# Patient Record
Sex: MALE | Race: BLACK OR AFRICAN AMERICAN | NOT HISPANIC OR LATINO | Employment: OTHER | URBAN - METROPOLITAN AREA
[De-identification: names, ages, dates, MRNs, and addresses within clinical notes are randomized per-mention and may not be internally consistent; named-entity substitution may affect disease eponyms.]

---

## 2017-01-06 ENCOUNTER — OFFICE VISIT (OUTPATIENT)
Dept: FAMILY MEDICINE | Facility: CLINIC | Age: 57
End: 2017-01-06

## 2017-01-06 ENCOUNTER — CLINICAL SUPPORT (OUTPATIENT)
Dept: FAMILY MEDICINE | Facility: CLINIC | Age: 57
End: 2017-01-06

## 2017-01-06 DIAGNOSIS — Z02.89 PHYSICAL EXAMINATION OF EMPLOYEE: Primary | ICD-10-CM

## 2017-01-06 DIAGNOSIS — Z02.1 PRE-EMPLOYMENT HEALTH SCREENING EXAMINATION: Primary | ICD-10-CM

## 2017-01-06 PROCEDURE — 99204 OFFICE O/P NEW MOD 45 MIN: CPT | Mod: ,,, | Performed by: FAMILY MEDICINE

## 2017-01-06 PROCEDURE — 94010 BREATHING CAPACITY TEST: CPT | Mod: ,,, | Performed by: FAMILY MEDICINE

## 2017-02-11 PROBLEM — E11.9 DIABETES MELLITUS, TYPE 2: Status: ACTIVE | Noted: 2017-02-11

## 2017-02-11 PROBLEM — R07.9 CHEST PAIN: Status: ACTIVE | Noted: 2017-02-11

## 2017-02-11 PROBLEM — I10 HYPERTENSION: Status: ACTIVE | Noted: 2017-02-11

## 2017-03-17 ENCOUNTER — TELEPHONE (OUTPATIENT)
Dept: VASCULAR SURGERY | Facility: CLINIC | Age: 57
End: 2017-03-17

## 2017-03-17 NOTE — TELEPHONE ENCOUNTER
Spoke with pt and  regarding FMLA paper work. Advised missing the last page.  Pt states she will fax over.  Once received will complete and return to pt.

## 2017-03-17 NOTE — TELEPHONE ENCOUNTER
----- Message from Rosibel Cazares sent at 3/17/2017 10:32 AM CDT -----  Contact: wife,  lizy   Wants to know if [facility] paperwork was received .  Lizy Lozano is Pt, but Luis is caretaker   Call back

## 2017-03-30 ENCOUNTER — OFFICE VISIT (OUTPATIENT)
Dept: FAMILY MEDICINE | Facility: CLINIC | Age: 57
End: 2017-03-30
Payer: COMMERCIAL

## 2017-03-30 VITALS
BODY MASS INDEX: 31.51 KG/M2 | HEART RATE: 75 BPM | DIASTOLIC BLOOD PRESSURE: 86 MMHG | HEIGHT: 69 IN | WEIGHT: 212.75 LBS | RESPIRATION RATE: 18 BRPM | OXYGEN SATURATION: 98 % | SYSTOLIC BLOOD PRESSURE: 134 MMHG

## 2017-03-30 DIAGNOSIS — Z12.5 ENCOUNTER FOR SCREENING FOR MALIGNANT NEOPLASM OF PROSTATE: ICD-10-CM

## 2017-03-30 DIAGNOSIS — Z13.9 SCREENING: ICD-10-CM

## 2017-03-30 DIAGNOSIS — Z00.00 ROUTINE PHYSICAL EXAMINATION: Primary | ICD-10-CM

## 2017-03-30 DIAGNOSIS — I10 ESSENTIAL HYPERTENSION: ICD-10-CM

## 2017-03-30 PROCEDURE — 99999 PR PBB SHADOW E&M-EST. PATIENT-LVL III: CPT | Mod: PBBFAC,,, | Performed by: INTERNAL MEDICINE

## 2017-03-30 PROCEDURE — 3075F SYST BP GE 130 - 139MM HG: CPT | Mod: S$GLB,,, | Performed by: INTERNAL MEDICINE

## 2017-03-30 PROCEDURE — 99386 PREV VISIT NEW AGE 40-64: CPT | Mod: S$GLB,,, | Performed by: INTERNAL MEDICINE

## 2017-03-30 PROCEDURE — 3079F DIAST BP 80-89 MM HG: CPT | Mod: S$GLB,,, | Performed by: INTERNAL MEDICINE

## 2017-03-30 RX ORDER — HYDROCHLOROTHIAZIDE 12.5 MG/1
12.5 TABLET ORAL DAILY
Qty: 30 TABLET | Refills: 6 | Status: SHIPPED | OUTPATIENT
Start: 2017-03-30 | End: 2017-10-23 | Stop reason: SDUPTHER

## 2017-03-30 RX ORDER — ASPIRIN 81 MG/1
81 TABLET ORAL DAILY
COMMUNITY
End: 2019-04-26 | Stop reason: SDUPTHER

## 2017-03-30 RX ORDER — GLIMEPIRIDE 4 MG/1
8 TABLET ORAL
Qty: 60 TABLET | Refills: 6 | Status: SHIPPED | OUTPATIENT
Start: 2017-03-30 | End: 2017-12-18 | Stop reason: SDUPTHER

## 2017-03-30 NOTE — PROGRESS NOTES
Subjective:       Patient ID: Luis Lozano is a 56 y.o. male.    Chief Complaint: Annual Exam    HPI Comments: Here for routine health maintenance.  No new complaints.    DM uncontrolled   HTN - controlled    Review of Systems   Constitutional: Negative for appetite change and fever.   HENT: Negative for nosebleeds and trouble swallowing.    Eyes: Negative for discharge and visual disturbance.   Respiratory: Negative for choking and shortness of breath.    Cardiovascular: Negative for chest pain and palpitations.   Gastrointestinal: Negative for abdominal pain, nausea and vomiting.   Musculoskeletal: Negative for arthralgias and joint swelling.   Skin: Negative for rash and wound.   Neurological: Negative for dizziness and syncope.   Psychiatric/Behavioral: Negative for confusion and dysphoric mood.       Objective:      Vitals:    03/30/17 0735   BP: 134/86   Pulse: 75   Resp: 18     Physical Exam   Constitutional: He appears well-nourished.   Eyes: Conjunctivae and EOM are normal.   Neck: Trachea normal and normal range of motion. No thyromegaly present.   Cardiovascular: Normal heart sounds.    Pulses:       Dorsalis pedis pulses are 2+ on the right side, and 2+ on the left side.   Edema negative   Pulmonary/Chest: Effort normal and breath sounds normal.   Abdominal: Soft. There is no hepatomegaly.   Musculoskeletal:   ROM normal bilateral  Strength normal bilateral   Feet:   Right Foot:   Protective Sensation: 4 sites tested. 4 sites sensed.   Left Foot:   Protective Sensation: 4 sites tested. 4 sites sensed.   Neurological: He has normal reflexes. No cranial nerve deficit.   Skin: Skin is warm, dry and intact.   Psychiatric: He has a normal mood and affect.   Alert and Oriented    Vitals reviewed.        Assessment:       1. Routine physical examination    2. Uncontrolled type 2 diabetes mellitus without complication, without long-term current use of insulin    3. Essential hypertension    4. Encounter for  "screening for malignant neoplasm of prostate    5. Screening        Plan:       Routine physical examination    Uncontrolled type 2 diabetes mellitus without complication, without long-term current use of insulin  -     Hemoglobin A1c; Future; Expected date: 6/28/17  -     Microalbumin/creatinine urine ratio; Future; Expected date: 6/28/17  -     CBC auto differential; Future; Expected date: 6/28/17  -     glimepiride (AMARYL) 4 MG tablet; Take 2 tablets (8 mg total) by mouth daily with breakfast.  Dispense: 60 tablet; Refill: 6  -     SITagliptan (JANUVIA) 100 MG Tab; Take 1 tablet (100 mg total) by mouth once daily.  Dispense: 30 tablet; Refill: 6  -     Ambulatory referral to Optometry    Essential hypertension  -     Lipid panel; Future; Expected date: 6/28/17  -     Comprehensive metabolic panel; Future; Expected date: 6/28/17  -     CBC auto differential; Future; Expected date: 6/28/17  -     hydrochlorothiazide (HYDRODIURIL) 12.5 MG Tab; Take 1 tablet (12.5 mg total) by mouth once daily.  Dispense: 30 tablet; Refill: 6    Encounter for screening for malignant neoplasm of prostate  -     PSA, Screening; Future; Expected date: 6/28/17    Screening  -     Hepatitis C antibody; Future; Expected date: 6/28/17    Wellness reviewed        Counseled on regular exercise, maintenance of a healthy weight, balanced diet rich in fruits/vegetables and lean protein, and avoidance of unhealthy habits like smoking and excessive alcohol intake.   Also, counseled on importance of being compliant with medication, health appointments, diet and exercise.     Return in about 3 months (around 6/30/2017).    "This note will not be shared with the patient."  "

## 2017-03-30 NOTE — MR AVS SNAPSHOT
[facility]  [address]  [facility] [address]  Phone: [phone]  Fax: [phone]                  Luis Lozano   3/30/2017 7:30 AM   Office Visit    Description:  Male : 1960   Provider:  Darrius Lawrence MD   Department:  [facility]           Reason for Visit     Annual Exam           Diagnoses this Visit        Comments    Routine physical examination    -  Primary     Uncontrolled type 2 diabetes mellitus without complication, without long-term current use of insulin         Essential hypertension         Encounter for screening for malignant neoplasm of prostate         Screening                To Do List           Future Appointments        Provider Department Dept Phone    6/3/2017 8:30 AM LAB, [place] [facility] [phone]    6/3/2017 10:15 AM LAB, [place] [facility] [phone]    2017 5:50 PM Darrius Lawrence MD [facility] [phone]      Goals (5 Years of Data)     None      Follow-Up and Disposition     Return in about 2 months (around 2017).    Follow-up and Disposition History       These Medications        Disp Refills Start End    glimepiride (AMARYL) 4 MG tablet 60 tablet 6 3/30/2017     Take 2 tablets (8 mg total) by mouth daily with breakfast. - Oral    Pharmacy: [facility] [address] [place] [address] Ph #: [phone]       hydrochlorothiazide (HYDRODIURIL) 12.5 MG Tab 30 tablet 6 3/30/2017     Take 1 tablet (12.5 mg total) by mouth once daily. - Oral    Pharmacy: [facility] [address] [place] [address] Ph #: [phone]       SITagliptan (JANUVIA) 100 MG Tab 30 tablet 6 3/30/2017 3/30/2018    Take 1 tablet (100 mg total) by mouth once daily. - Oral    Pharmacy: [facility] [address] [place] [address] Ph #: [phone]         [facility]     [facility] Nurse Care Line -  Assistance  Unless  otherwise directed by your provider, please contact [facility], our nurse care line that is available for 24/7 assistance.     Registered nurses in the [facility] provide: appointment scheduling, clinical advisement, health education, and other advisory services.  Call: [phone] (toll free)               Medications           Message regarding Medications     Verify the changes and/or additions to your medication regime listed below are the same as discussed with your clinician today.  If any of these changes or additions are incorrect, please notify your healthcare provider.        START taking these NEW medications        Refills    SITagliptan (JANUVIA) 100 MG Tab 6    Sig: Take 1 tablet (100 mg total) by mouth once daily.    Class: Normal    Route: Oral      CHANGE how you are taking these medications     Start Taking Instead of    glimepiride (AMARYL) 4 MG tablet glimepiride (AMARYL) 4 MG tablet    Dosage:  Take 2 tablets (8 mg total) by mouth daily with breakfast. Dosage:  4 mg daily with breakfast.     Reason for Change:  Reorder     hydrochlorothiazide (HYDRODIURIL) 12.5 MG Tab hydrochlorothiazide (HYDRODIURIL) 25 MG tablet    Dosage:  Take 1 tablet (12.5 mg total) by mouth once daily. Dosage:  Take 12.5 mg by mouth once daily.     Reason for Change:  Reorder       STOP taking these medications     VIAGRA 100 mg tablet     chlorthalidone (HYGROTEN) 25 MG Tab 25 mg once daily.            Verify that the below list of medications is an accurate representation of the medications you are currently taking.  If none reported, the list may be blank. If incorrect, please contact your healthcare provider. Carry this list with you in case of emergency.           Current Medications     aspirin (ECOTRIN) 81 MG EC tablet Take 81 mg by mouth once daily.    glimepiride (AMARYL) 4 MG tablet Take 2 tablets (8 mg total) by mouth daily with breakfast.    hydrochlorothiazide (HYDRODIURIL) 12.5 MG Tab  "Take 1 tablet (12.5 mg total) by mouth once daily.    multivitamin (ONE DAILY MULTIVITAMIN) per tablet Take 1 tablet by mouth once daily.    SITagliptan (JANUVIA) 100 MG Tab Take 1 tablet (100 mg total) by mouth once daily.           Clinical Reference Information           Your Vitals Were     BP Pulse Resp Height Weight SpO2    134/86 (BP Location: Right arm, Patient Position: Sitting, BP Method: Manual) 75 18 5' 9" (1.753 m) 96.5 kg (212 lb 11.9 oz) 98%    BMI                31.42 kg/m2          Blood Pressure          Most Recent Value    BP  134/86      Allergies as of 3/30/2017     No Known Allergies      Immunizations Administered on Date of Encounter - 3/30/2017     None      Orders Placed During Today's Visit      Normal Orders This Visit    Ambulatory referral to Optometry     Future Labs/Procedures Expected by Expires    CBC auto differential  6/28/2017 5/29/2018    Comprehensive metabolic panel  6/28/2017 3/30/2018    Hemoglobin A1c  6/28/2017 3/30/2018    Hepatitis C antibody  6/28/2017 5/29/2018    Lipid panel  6/28/2017 3/30/2018    Microalbumin/creatinine urine ratio  6/28/2017 3/30/2018    PSA, Screening  6/28/2017 3/30/2018      Language Assistance Services     ATTENTION: Language assistance services are available, free of charge. Please call [phone].      ATENCIÓN: Si habla español, tiene a ubrch disposición servicios gratuitos de asistencia lingüística. Llame al [phone].     CHÚ Ý: N?u b?n nói Ti?ng Vi?t, có các d?ch v? h? tr? ngôn ng? mi?n phí dành cho b?n. G?i s? [phone].         [facility] complies with applicable Federal civil rights laws and does not discriminate on the basis of race, color, national origin, age, disability, or sex.        "

## 2017-04-06 ENCOUNTER — TELEPHONE (OUTPATIENT)
Dept: FAMILY MEDICINE | Facility: CLINIC | Age: 57
End: 2017-04-06

## 2017-05-30 ENCOUNTER — PATIENT OUTREACH (OUTPATIENT)
Dept: ADMINISTRATIVE | Facility: HOSPITAL | Age: 57
End: 2017-05-30

## 2017-05-30 NOTE — LETTER
May 30, 2017    Luis Lozano  [address] [address]             [facility]  [address]  [facility] [place] [facility] [address]  Phone: [phone] Dear Mr. Lozano:    [facility] is committed to your overall health.  To help you get the most out of each of your visits, we will review your information to make sure you are up to date on all of your recommended tests and/or procedures.      Dr. Lawrence       has found that you may be due for:    Tetanus immunization    REMINDER: lab appointment 6/3/17    If you have had any of the above done at another facility, please bring the records or information with you so that your record at [facility] will be complete.     If you are currently taking medication, please bring it with you to your appointment for review.    If you have any questions or concerns, please don't hesitate to call.    Sincerely,    Jenn Strong  Clinical Care Coordinator  [facility] [address] [facility] [place] [address]  Phone: [phone]   Fax: [phone]

## 2017-06-03 ENCOUNTER — LAB VISIT (OUTPATIENT)
Dept: LAB | Facility: HOSPITAL | Age: 57
End: 2017-06-03
Attending: INTERNAL MEDICINE
Payer: COMMERCIAL

## 2017-06-03 DIAGNOSIS — Z13.9 ENCOUNTER FOR SCREENING, UNSPECIFIED: ICD-10-CM

## 2017-06-03 DIAGNOSIS — Z12.5 ENCOUNTER FOR SCREENING FOR MALIGNANT NEOPLASM OF PROSTATE: ICD-10-CM

## 2017-06-03 DIAGNOSIS — I10 ESSENTIAL HYPERTENSION: ICD-10-CM

## 2017-06-03 LAB
ALBUMIN SERPL BCP-MCNC: 3.7 G/DL
ALP SERPL-CCNC: 52 U/L
ALT SERPL W/O P-5'-P-CCNC: 25 U/L
ANION GAP SERPL CALC-SCNC: 8 MMOL/L
AST SERPL-CCNC: 22 U/L
BASOPHILS # BLD AUTO: 0.05 K/UL
BASOPHILS NFR BLD: 0.8 %
BILIRUB SERPL-MCNC: 1 MG/DL
BUN SERPL-MCNC: 23 MG/DL
CALCIUM SERPL-MCNC: 9.6 MG/DL
CHLORIDE SERPL-SCNC: 100 MMOL/L
CHOLEST/HDLC SERPL: 4 {RATIO}
CO2 SERPL-SCNC: 31 MMOL/L
COMPLEXED PSA SERPL-MCNC: 1.1 NG/ML
CREAT SERPL-MCNC: 1.2 MG/DL
DIFFERENTIAL METHOD: NORMAL
EOSINOPHIL # BLD AUTO: 0.1 K/UL
EOSINOPHIL NFR BLD: 1.9 %
ERYTHROCYTE [DISTWIDTH] IN BLOOD BY AUTOMATED COUNT: 13.4 %
EST. GFR  (AFRICAN AMERICAN): >60 ML/MIN/1.73 M^2
EST. GFR  (NON AFRICAN AMERICAN): >60 ML/MIN/1.73 M^2
GLUCOSE SERPL-MCNC: 207 MG/DL
HCT VFR BLD AUTO: 50.6 %
HDL/CHOLESTEROL RATIO: 24.9 %
HDLC SERPL-MCNC: 225 MG/DL
HDLC SERPL-MCNC: 56 MG/DL
HGB BLD-MCNC: 17.1 G/DL
LDLC SERPL CALC-MCNC: 146 MG/DL
LYMPHOCYTES # BLD AUTO: 2 K/UL
LYMPHOCYTES NFR BLD: 31.4 %
MCH RBC QN AUTO: 29.9 PG
MCHC RBC AUTO-ENTMCNC: 33.8 %
MCV RBC AUTO: 89 FL
MONOCYTES # BLD AUTO: 0.4 K/UL
MONOCYTES NFR BLD: 7.1 %
NEUTROPHILS # BLD AUTO: 3.6 K/UL
NEUTROPHILS NFR BLD: 58.5 %
NONHDLC SERPL-MCNC: 169 MG/DL
PLATELET # BLD AUTO: 253 K/UL
PMV BLD AUTO: 11.1 FL
POTASSIUM SERPL-SCNC: 4.2 MMOL/L
PROT SERPL-MCNC: 7.3 G/DL
RBC # BLD AUTO: 5.71 M/UL
SODIUM SERPL-SCNC: 139 MMOL/L
TRIGL SERPL-MCNC: 115 MG/DL
WBC # BLD AUTO: 6.22 K/UL

## 2017-06-03 PROCEDURE — 80053 COMPREHEN METABOLIC PANEL: CPT

## 2017-06-03 PROCEDURE — 85025 COMPLETE CBC W/AUTO DIFF WBC: CPT

## 2017-06-03 PROCEDURE — 86803 HEPATITIS C AB TEST: CPT

## 2017-06-03 PROCEDURE — 80061 LIPID PANEL: CPT

## 2017-06-03 PROCEDURE — 36415 COLL VENOUS BLD VENIPUNCTURE: CPT | Mod: PO

## 2017-06-03 PROCEDURE — 83036 HEMOGLOBIN GLYCOSYLATED A1C: CPT

## 2017-06-03 PROCEDURE — 84153 ASSAY OF PSA TOTAL: CPT

## 2017-06-05 LAB
ESTIMATED AVG GLUCOSE: 258 MG/DL
HBA1C MFR BLD HPLC: 10.6 %
HCV AB SERPL QL IA: NEGATIVE

## 2017-06-12 ENCOUNTER — OFFICE VISIT (OUTPATIENT)
Dept: FAMILY MEDICINE | Facility: CLINIC | Age: 57
End: 2017-06-12
Payer: COMMERCIAL

## 2017-06-12 VITALS
WEIGHT: 213.38 LBS | SYSTOLIC BLOOD PRESSURE: 110 MMHG | HEART RATE: 62 BPM | HEIGHT: 69 IN | OXYGEN SATURATION: 99 % | DIASTOLIC BLOOD PRESSURE: 74 MMHG | BODY MASS INDEX: 31.6 KG/M2

## 2017-06-12 DIAGNOSIS — I10 ESSENTIAL HYPERTENSION: ICD-10-CM

## 2017-06-12 DIAGNOSIS — E78.5 DYSLIPIDEMIA: ICD-10-CM

## 2017-06-12 PROCEDURE — 3046F HEMOGLOBIN A1C LEVEL >9.0%: CPT | Mod: S$GLB,,, | Performed by: INTERNAL MEDICINE

## 2017-06-12 PROCEDURE — 99214 OFFICE O/P EST MOD 30 MIN: CPT | Mod: S$GLB,,, | Performed by: INTERNAL MEDICINE

## 2017-06-12 PROCEDURE — 99999 PR PBB SHADOW E&M-EST. PATIENT-LVL III: CPT | Mod: PBBFAC,,, | Performed by: INTERNAL MEDICINE

## 2017-06-12 RX ORDER — PIOGLITAZONEHYDROCHLORIDE 30 MG/1
30 TABLET ORAL DAILY
Qty: 30 TABLET | Refills: 6 | Status: SHIPPED | OUTPATIENT
Start: 2017-06-12 | End: 2019-08-05

## 2017-06-12 RX ORDER — ALOGLIPTIN 25 MG/1
25 TABLET, FILM COATED ORAL DAILY
Qty: 30 TABLET | Refills: 6 | Status: SHIPPED | OUTPATIENT
Start: 2017-06-12 | End: 2017-06-14

## 2017-06-12 RX ORDER — METFORMIN HYDROCHLORIDE 500 MG/1
TABLET, EXTENDED RELEASE ORAL
Qty: 120 TABLET | Refills: 6 | Status: SHIPPED | OUTPATIENT
Start: 2017-06-12 | End: 2018-08-23 | Stop reason: SDUPTHER

## 2017-06-12 RX ORDER — ATORVASTATIN CALCIUM 20 MG/1
20 TABLET, FILM COATED ORAL DAILY
Qty: 30 TABLET | Refills: 6 | Status: SHIPPED | OUTPATIENT
Start: 2017-06-12 | End: 2019-04-26 | Stop reason: SDUPTHER

## 2017-06-14 ENCOUNTER — TELEPHONE (OUTPATIENT)
Dept: DIABETES | Facility: CLINIC | Age: 57
End: 2017-06-14

## 2017-06-14 ENCOUNTER — TELEPHONE (OUTPATIENT)
Dept: FAMILY MEDICINE | Facility: CLINIC | Age: 57
End: 2017-06-14

## 2017-06-14 NOTE — TELEPHONE ENCOUNTER
Please, call and have pt  new rx.  Have pt ask pharmacy to see which drug his insurance would cover in the same family as alogliptin.

## 2017-06-14 NOTE — TELEPHONE ENCOUNTER
Contacted patient to see if we could find him appointment that would work with his schedule.  Unable to reach him but left message on his machine for him to call us back.      ----- Message from Janeth Ray sent at 6/12/2017  5:57 PM CDT -----  Contact: self and wife at check out  Dr Lawrence wants Mr Smith and his wife Lizy ([id number]) to have Diabetic Education. They need something later in the evening as he has no vacation time left. I did not know if there was any class or another office that did evening appointments (after 4 pm). Would you mind calling the patient to see if you can help them with this? If not let me know and I will inform Dr Lawrence for other options....  Thank you,  Sravani Ray  FP/IM Check out   [facility]  Phone [phone]  Fax     [phone]

## 2017-09-06 ENCOUNTER — PATIENT OUTREACH (OUTPATIENT)
Dept: ADMINISTRATIVE | Facility: HOSPITAL | Age: 57
End: 2017-09-06

## 2017-09-06 NOTE — LETTER
September 6, 2017    Luis Lozano  [address] [address]             [facility]  [address]  [facility] [place] [facility] [address]  Phone: [phone] Dear Mr. Lozano:    [facility] is committed to your overall health.  To help you get the most out of each of your visits, we will review your information to make sure you are up to date on all of your recommended tests and/or procedures.      Dr. Lawrence       has found that you may be due for:    Annual Dilated Eye Exam  Tetanus immunization    REMINDER:  lab appointment 9/16/17    If you have had any of the above done at another facility, please bring the records or information with you so that your record at [facility] will be complete.     If you are currently taking medication, please bring it with you to your appointment for review.    If you have any questions or concerns, please don't hesitate to call.    Sincerely,    Jenn Strong  Clinical Care Coordinator  [facility] [address] [facility]  [place] [address]  Phone: [phone]   Fax: [phone]

## 2017-09-15 ENCOUNTER — TELEPHONE (OUTPATIENT)
Dept: ADMINISTRATIVE | Facility: HOSPITAL | Age: 57
End: 2017-09-15

## 2017-09-15 NOTE — TELEPHONE ENCOUNTER
"Patient is enrolled in [facility] Quality [employer] program for assistance with management of chronic disease. He/she will receive pre-visit and post-visit calls from a [employer] nurse to assist with closing any gaps in patient's care. Patient informed the nurse of the following:         "Member confirmed appointment with Dr. Lawrence on 9/20/17. Member stated he will have labs drawn prior to the visit. Stated he is checking his blood sugar daily and ranges are between 104-114 fasting. Encouraged member to bring readings to appointment. No questions or concerns regarding upcoming appointment (9/5)  "

## 2017-10-23 DIAGNOSIS — I10 ESSENTIAL HYPERTENSION: ICD-10-CM

## 2017-10-23 RX ORDER — HYDROCHLOROTHIAZIDE 12.5 MG/1
TABLET ORAL
Qty: 90 TABLET | Refills: 0 | Status: SHIPPED | OUTPATIENT
Start: 2017-10-23 | End: 2017-12-18 | Stop reason: SDUPTHER

## 2017-10-23 NOTE — PROGRESS NOTES
Refill Authorization Note     is requesting a refill authorization.    Brief assessment and rational for refill: APPROVE: prr  Name of medication: HYDROCHLOROT 12.5MG TAB  How patient will take medication: t1t po daily   Amount/Quantity of medication ordered: 90d   Medication reconciliation completed: No        Refills Authorized: Yes  If authorized number of refills: 0        Medication Therapy Plan: Last kidney labs WNL.  Last BP controlled.  Needs updated kidney labs; old order active but NTB scheduled.  Hilario 3 mo  Comments:   Lab Results   Component Value Date    CREATININE 1.2 06/03/2017    BUN 23 (H) 06/03/2017     06/03/2017    K 4.2 06/03/2017     06/03/2017    CO2 31 (H) 06/03/2017      BP Readings from Last 3 Encounters:   06/12/17 110/74   05/23/17 (!) 147/85   03/30/17 134/86

## 2017-10-23 NOTE — TELEPHONE ENCOUNTER
Spoke with wife, instructed that medication was refilled, but pt needs appt. Verbalized understanding. States that she will call back to schedule at later time due to his difficulty getting off of work for appointment.

## 2017-11-08 ENCOUNTER — PATIENT OUTREACH (OUTPATIENT)
Dept: ADMINISTRATIVE | Facility: HOSPITAL | Age: 57
End: 2017-11-08

## 2017-11-08 NOTE — LETTER
November 8, 2017    Luis Lozano  [address] [address]             [facility]  [address]  [place] [address]  Phone: [phone] Dear Mr. Lozano:    [facility] is committed to your overall health and would like to ensure that you are up to date on your recommended health testing.   Dr. Lawrence has found that you may be due for the following:    Tetanus immunization  Influenza vaccine    Hemoglobin A1C (order is in and ready to schedule)    Diabetic Retinopathy EYE EXAM screening (we now are able to perform this test the same day as your office visit with a health care team member without paying a second copay)    If you have had any of the above done at another facility, please let us know by calling or faxing to the numbers below so that your medical record can be updated. If you have a copy of these records, please fax them to the fax number below.  If not, please call [phone] so that we can get the necessary information to obtain copies from that facility.     Otherwise, please schedule these appointments at your earliest convenience by calling [phone] or going to [email or web address].    Sincerely,    Jenn Strong  Clinical Care Coordinator  [facility] [address] [place] [address]  Phone: [phone]   Fax: [phone]

## 2017-11-08 NOTE — PROGRESS NOTES
Health Maintenance Due   Topic Date Due    Eye Exam  05/06/1968    TETANUS VACCINE  05/06/1976    Influenza Vaccine  08/01/2017    Hemoglobin A1c  09/03/2017     Letter mailed

## 2017-12-18 DIAGNOSIS — I10 ESSENTIAL HYPERTENSION: ICD-10-CM

## 2017-12-18 RX ORDER — GLIMEPIRIDE 4 MG/1
TABLET ORAL
Qty: 60 TABLET | Refills: 0 | Status: SHIPPED | OUTPATIENT
Start: 2017-12-18 | End: 2019-04-26

## 2017-12-18 RX ORDER — HYDROCHLOROTHIAZIDE 12.5 MG/1
TABLET ORAL
Qty: 30 TABLET | Refills: 0 | Status: SHIPPED | OUTPATIENT
Start: 2017-12-18 | End: 2018-04-09 | Stop reason: SDUPTHER

## 2017-12-18 NOTE — TELEPHONE ENCOUNTER
Needs an appointment with a provider; preferably NP Claudine - next available.   Labs right before appointment.  Pend to Claudine

## 2018-02-23 ENCOUNTER — CLINICAL SUPPORT (OUTPATIENT)
Dept: FAMILY MEDICINE | Facility: CLINIC | Age: 58
End: 2018-02-23

## 2018-02-23 ENCOUNTER — OFFICE VISIT (OUTPATIENT)
Dept: FAMILY MEDICINE | Facility: CLINIC | Age: 58
End: 2018-02-23

## 2018-02-23 DIAGNOSIS — Z02.89 PHYSICAL EXAMINATION OF EMPLOYEE: Primary | ICD-10-CM

## 2018-02-23 DIAGNOSIS — Z13.5 DIABETIC RETINOPATHY SCREENING: ICD-10-CM

## 2018-02-23 PROCEDURE — 99204 OFFICE O/P NEW MOD 45 MIN: CPT | Mod: ,,, | Performed by: FAMILY MEDICINE

## 2018-02-23 PROCEDURE — 94010 BREATHING CAPACITY TEST: CPT | Mod: ,,, | Performed by: FAMILY MEDICINE

## 2018-03-02 ENCOUNTER — PATIENT OUTREACH (OUTPATIENT)
Dept: ADMINISTRATIVE | Facility: HOSPITAL | Age: 58
End: 2018-03-02

## 2018-03-02 NOTE — PROGRESS NOTES
Due for a routine check up with     Health Maintenance Due   Topic Date Due    Eye Exam  05/06/1968    TETANUS VACCINE  05/06/1976    Influenza Vaccine  08/01/2017    Hemoglobin A1c  09/03/2017    Foot Exam  03/30/2018

## 2018-03-02 NOTE — LETTER
March 2, 2018    Luis Lozano  [address] [address]             [facility]  [address]  [facility] [address]  Phone: [phone] Dear Mr. Lozano:    [facility] is committed to your overall health.  Our records indicate that you are due for a checkup with your primary care provider,  Dr. Lawrence.  Please call [phone] to schedule a routine physical exam. You may also be due for the following test and/or procedures:    Tetanus immunization  Influenza vaccine  Diabetic Foot Exam  Diabetic Retinopathy EYE EXAM screening (we now are able to perform this test the same day as your office visit with a health care team member without paying a second copay)    The following lab test have been ordered and ready to schedule:      If you have had any of the above done at another facility, please let us know by calling [phone] so that we can update your record.  We will add these results to your chart if you fax them to the fax number listed below.  If you have any questions, please call [phone].    Sincerely,  Jenn Strong  Clinical Care Coordinator  [facility] [address] [place] [address]  Phone: [phone]   Fax: [phone]

## 2018-03-08 LAB
CHOLESTEROL, TOTAL: 159
HDLC SERPL-MCNC: 52 MG/DL
LDLC SERPL CALC-MCNC: 93 MG/DL (ref 0–160)
TRIGL SERPL-MCNC: 72 MG/DL (ref 40–160)

## 2018-04-09 DIAGNOSIS — I10 ESSENTIAL HYPERTENSION: ICD-10-CM

## 2018-04-10 RX ORDER — HYDROCHLOROTHIAZIDE 12.5 MG/1
TABLET ORAL
Qty: 30 TABLET | Refills: 0 | Status: SHIPPED | OUTPATIENT
Start: 2018-04-10 | End: 2019-04-26 | Stop reason: SDUPTHER

## 2018-04-10 NOTE — TELEPHONE ENCOUNTER
Patient stated [facility] at  [facility] - advised this will be the last refill from Dr Lawrence unless he comes back to clinic - verbally understood

## 2018-04-10 NOTE — TELEPHONE ENCOUNTER
Needs appointment with me in my next available regular appointment time slot (do not use any hold spots).   Labs before: A1c, flp, cmp dx DM, HTN, HLD

## 2018-06-13 LAB — A1C: 9.1

## 2018-08-02 LAB
LEFT EYE DM RETINOPATHY: NEGATIVE
RIGHT EYE DM RETINOPATHY: NEGATIVE

## 2018-08-23 DIAGNOSIS — I10 HYPERTENSION, UNSPECIFIED TYPE: Primary | ICD-10-CM

## 2018-08-24 RX ORDER — METFORMIN HYDROCHLORIDE 500 MG/1
TABLET, EXTENDED RELEASE ORAL
Qty: 120 TABLET | Refills: 0 | Status: SHIPPED | OUTPATIENT
Start: 2018-08-24 | End: 2019-06-17 | Stop reason: SDUPTHER

## 2018-08-24 NOTE — TELEPHONE ENCOUNTER
Needs an appointment. Needs an appointment with me.  OK Green Hold.  NO Yellow hold and NO AREX hold.   A1c, flp, cmp, micro urine before dx dm, htn

## 2018-11-07 ENCOUNTER — OFFICE VISIT (OUTPATIENT)
Dept: PODIATRY | Facility: CLINIC | Age: 58
End: 2018-11-07
Payer: COMMERCIAL

## 2018-11-07 ENCOUNTER — HOSPITAL ENCOUNTER (OUTPATIENT)
Dept: RADIOLOGY | Facility: HOSPITAL | Age: 58
Discharge: HOME | End: 2018-11-07
Attending: PODIATRIST
Payer: COMMERCIAL

## 2018-11-07 VITALS
DIASTOLIC BLOOD PRESSURE: 90 MMHG | WEIGHT: 219 LBS | BODY MASS INDEX: 32.44 KG/M2 | HEART RATE: 67 BPM | SYSTOLIC BLOOD PRESSURE: 159 MMHG | HEIGHT: 69 IN

## 2018-11-07 DIAGNOSIS — M76.61 TENDONITIS, ACHILLES, RIGHT: ICD-10-CM

## 2018-11-07 DIAGNOSIS — M21.6X1 ACQUIRED EQUINUS DEFORMITY OF BOTH FEET: ICD-10-CM

## 2018-11-07 DIAGNOSIS — M72.2 PLANTAR FASCIITIS OF LEFT FOOT: ICD-10-CM

## 2018-11-07 DIAGNOSIS — M21.6X2 ACQUIRED EQUINUS DEFORMITY OF BOTH FEET: ICD-10-CM

## 2018-11-07 DIAGNOSIS — M21.6X1 ACQUIRED EQUINUS DEFORMITY OF BOTH FEET: Primary | ICD-10-CM

## 2018-11-07 DIAGNOSIS — M21.6X2 ACQUIRED EQUINUS DEFORMITY OF BOTH FEET: Primary | ICD-10-CM

## 2018-11-07 PROCEDURE — 3080F DIAST BP >= 90 MM HG: CPT | Mod: CPTII,S$GLB,, | Performed by: PODIATRIST

## 2018-11-07 PROCEDURE — 99999 PR PBB SHADOW E&M-EST. PATIENT-LVL IV: CPT | Mod: PBBFAC,,, | Performed by: PODIATRIST

## 2018-11-07 PROCEDURE — 99203 OFFICE O/P NEW LOW 30 MIN: CPT | Mod: S$GLB,,, | Performed by: PODIATRIST

## 2018-11-07 PROCEDURE — 73650 X-RAY EXAM OF HEEL: CPT | Mod: 26,50,, | Performed by: RADIOLOGY

## 2018-11-07 PROCEDURE — 73650 X-RAY EXAM OF HEEL: CPT | Mod: 50,TC,PO

## 2018-11-07 PROCEDURE — 3008F BODY MASS INDEX DOCD: CPT | Mod: CPTII,S$GLB,, | Performed by: PODIATRIST

## 2018-11-07 PROCEDURE — 3077F SYST BP >= 140 MM HG: CPT | Mod: CPTII,S$GLB,, | Performed by: PODIATRIST

## 2018-11-07 RX ORDER — METHYLPREDNISOLONE 4 MG/1
TABLET ORAL
Qty: 1 PACKAGE | Refills: 0 | Status: SHIPPED | OUTPATIENT
Start: 2018-11-07 | End: 2018-11-28

## 2018-11-07 NOTE — PATIENT INSTRUCTIONS
1. Stretch calf and plantar fascia at least 3x per day for 30 sec and before getting out of bed.    2. Supportive shoes at all times (athletic shoe including pemberton, new balance, asics, HOKA or casual shoes like Dansko, Felicia, Naot, Vionoic, Fit flop  clog or wedge with extra heel padding and arch support. For house slippers would recommend Fitflop or Spenco found on amazon.com, Never walk barefoot or in flats.    (Varsity sports, Phidippides, [place] running company, Masseys, Goodfeet, Cantilever, Feet First, Foot Solutions, Therapeutic shoes, SAS, [facility] pro shop) http://www.Analiza/    3. Orthotic (recommend the following brands: Superfeet, Spenco, Powerstep, Sof Sole Fit Series)              4. NSAID's for 2-3 weeks if no GI or Kidney problems (example: ibuprofen 600 mg 2 x per day)    5. ICE massage with frozen water bottle 2x per day for 30 minutes.    6. Consider night splint, custom orthotics, therapy and/or steroid injection    What Is Plantar Fasciitis?   The plantar fascia is a ligament-like band running from your heel to the ball of your foot. This band pulls on the heel bone, raising the arch of your foot as it pushes off the ground. But if your foot moves incorrectly, the plantar fascia may become strained. The fascia may swell and its tiny fibers may begin to fray, causing plantar fasciitis.  Causes  Plantar fasciitis is often caused by poor foot mechanics. If your foot flattens too much, the fascia may overstretch and swell. If your foot flattens too little, the fascia may ache from being pulled too tight.    The plantar fascia is a thick, fibrous layer of tissue that covers the bones on the bottom of your foot. It holds the foot bones in an arched position. Plantar fasciitis is a painful swelling of the plantar fascia.  A heel spur is an overgrowth of bone where the plantar fascia attaches to the heel bone. The heel spur itself usually doesnt cause pain. However, the heel  spur might be a sign of plantar fasciitis which may cause your foot pain. There is no specific treatment for heel spurs.   Plantar fasciitis can develop slowly or suddenly. It usually affects one foot at a time. Heel pain can feel sharp, like a knife sticking into the bottom of your foot. You may feel pain after exercising, long-distance jogging, stair climbing, long periods of standing, or after standing up.  Risk factors for plantar fasciitis include: arthritis, diabetes, obesity or recent weight gain, flat foot, and having high arches. Wearing high heels, loose shoes, or shoes with poor arch support adds to the risk.    Foot pain is usually worse in the morning. But it improves with walking. By the end of the day there may be a dull aching. Treatment includes short-term rest and controlling inflammation. It may take up to 9 months before all symptoms go away. In rare cases, a steroid injection in the foot, or surgery, may be needed.  Home care  · If you are overweight, lose weight to help healing.  · Choose supportive shoes with good arch support and shock absorbency. Replace athletic shoes when they become worn out. Dont walk or run barefoot.  · Premade or custom-fitted shoe inserts may be helpful. Inserts made of silicone seem to be the most effective. Custom-made inserts can be provided by a podiatrist or foot specialist, physical therapist, or orthopedist.  · Premade or custom-made night splints keep the heel stretched out while you sleep. They may prevent morning pain.  · Avoid activities that stress the feet: jogging, prolonged standing or walking, contact sports, etc.  · First thing in the morning and before sports, stretch the bottom of your foot. Gently flex your ankle so the toes move toward your knee.  · Icing may help control heel pain. Apply an ice pack to the heel for 10-20 minutes as a preventive. Or ice your heel after a severe flare-up of symptoms. You may repeat this every 1-2 hours as  needed.  · You may use over-the-counter pain medicine to control pain, unless another medicine was prescribed. Anti-inflammatory pain medicines, such as ibuprofen or naproxen, may work better than acetaminophen. If you have chronic liver or kidney disease or ever had a stomach ulcer or GI bleeding, talk with your healthcare provider before using these medicines.  · Shoe inserts, a night splint, or a special boot may be needed. Use these as directed by your healthcare provider.      Treating Plantar Fasciitis    First, your doctor relieves pain. Then, the cause of your problem may be found and corrected. If your pain is due to poor foot mechanics, custom-made shoe inserts (orthoses) may help.        Reduce Symptoms:  · To relieve mild symptoms, try aspirin, ibuprofen, or other medications as directed. Rubbing ice on the affected area may also help.  · To reduce severe pain and swelling, your doctor may prescribe pills or injections or a walking cast in some instances. Physical therapy, such as ultrasound or a daily stretching program, may also be recommended. Surgery is rarely required.  · To reduce symptoms caused by poor foot mechanics, your foot may be taped. This supports the arch and temporarily controls movement. Night splints may also help by stretching the fascia.    Control Movement  If taping helps, your doctor may prescribe orthoses. Built from plaster casts of your feet, these inserts control the way your foot moves. As a result, your symptoms should go away.  If Surgery Is Needed  Your doctor may consider surgery if other types of treatment don't control your pain. During surgery, the plantar fascia is partially cut to release tension. As you heal, fibrous tissue fills the space between the heel bone and the plantar fascia.   Reduce Overuse  Every time your foot strikes the ground, the plantar fascia is stretched. You can reduce the strain on the plantar fascia and the possibility of overuse by following  these suggestions:  · Lose any excess weight.  · Avoid running on hard or uneven ground.  · Use orthoses at all times in your shoes and house slippers.  © 3242-4493 [employer]. [address] [place] [address]. All rights reserved. This information is not intended as a substitute for professional medical care. Always follow your healthcare professional's instructions.            _                Lower Body Exercises: Calf Stretch    This exercise both stretches and strengthens your lower body to help your back. Do the exercise as often as suggested by your health care provider. As you work out, dont rush or strain. Use an exercise mat, pillow, or folded towel to protect your knees and other sensitive areas.  · Face a wall 2 feet away. Step toward the wall with one foot.  · Place both palms on the wall and bend your front knee.  · Lean forward, keeping the back leg straight and the heel on the floor.  · Hold for 20 seconds. Switch legs.  © 4797-7782 [employer]. [address] [place] [address]. All rights reserved. This information is not intended as a substitute for professional medical care. Always follow your healthcare professional's instructions.    These instructions are for your right foot. Switch sides for your left foot.  1. Sit in a chair. Rest your right ankle on your left knee.  2. Hold your toes with your right hand. Gently bend the toes backward. Feel a stretch in the undersides of the toes and ball of the foot. Hold for 30 to 60 seconds.  3. Then gently bend the toes in the other direction. Gently press on them until your foot is pointed. Hold for 30 to 60 seconds.  4. Repeat 5 times, or as instructed.  Date Last Reviewed: 5/1/2016  © 7895-2505 [employer]. [address] [place] [address]. All rights reserved. This information is not intended as a substitute for professional medical care. Always follow your healthcare  professional's instructions.

## 2018-11-14 NOTE — PROGRESS NOTES
Subjective:      Patient ID: Luis Lozano is a 58 y.o. male.    Chief Complaint: Diabetes Mellitus (PCP:  Dr Pisano (Dr Reyes) 2/23/18; HgbA1c: 6/3/17  10.6); Foot Pain (amanda ); and Heel Pain (left)    Luis is a 58 y.o. male who presents to the clinic complaining of heel pain in the left foot, especially with the first step in the morning. The pain is described as Aching and Sharp. The onset of the pain was gradual and has worsened over the past several weeks. Luis rates the pain as 10/10. He denies a history of trauma. Prior treatments include rest.    Secondary complaint of posterior heel pain on the right foot, this is with weight bearing and better with rest. No treatment to date. This has been getting worse over the last several months as well.    Review of Systems   Constitution: Negative for chills and fever.   Cardiovascular: Negative for claudication and leg swelling.   Respiratory: Negative for shortness of breath.    Skin: Negative for itching, nail changes and rash.   Musculoskeletal: Positive for arthritis. Negative for muscle cramps, muscle weakness and myalgias.   Gastrointestinal: Negative for nausea and vomiting.   Neurological: Negative for focal weakness, loss of balance, numbness and paresthesias.           Objective:      Physical Exam   Constitutional: He is oriented to person, place, and time. He appears well-developed and well-nourished. No distress.   Cardiovascular:   Pulses:       Dorsalis pedis pulses are 2+ on the right side, and 2+ on the left side.        Posterior tibial pulses are 2+ on the right side, and 2+ on the left side.   < 3 sec capillary refill time to toes 1-5 bilateral. Toes and feet are warm to touch proximally with normal distal cooling b/l. There is no hair growth on the feet and toes b/l. There is moderate edema b/l. No spider veins or varicosities present b/l.      Musculoskeletal:   Pain on palpation plantar medial and central heel left foot. No pain with ROM or  MMT. No pain with medial and lateral compression of heel.    Right posterior heel there is pain with palpation to the achilles tendon insertion at the calcaneus. Pain with active plantar flexion and passive DF at the ankle to the area of the achilles tendon insertion.     Equinus noted b/l ankles with < 3 deg DF noted. MMT 5/5 in DF/PF/Inv/Ev resistance with no reproduction of pain in any direction. Passive range of motion of ankle and pedal joints is painless b/l.     Neurological: He is alert and oriented to person, place, and time. He has normal strength. He displays no atrophy and no tremor. No sensory deficit. He exhibits normal muscle tone.   Negative tinel sign bilateral.   Skin: Skin is warm, dry and intact. No abrasion, no bruising, no burn, no ecchymosis, no laceration, no lesion, no petechiae and no rash noted. He is not diaphoretic. No cyanosis or erythema. No pallor. Nails show no clubbing.   Skin temperature, texture and turgor within normal limits.   Psychiatric: He has a normal mood and affect. His behavior is normal.             Assessment:       Encounter Diagnoses   Name Primary?    Acquired equinus deformity of both feet Yes    Plantar fasciitis of left foot     Tendonitis, Achilles, right          Plan:       Luis was seen today for diabetes mellitus, foot pain and heel pain.    Diagnoses and all orders for this visit:    Acquired equinus deformity of both feet  -     Cancel: Ambulatory consult to Physical Therapy  -     Ambulatory consult to Physical Therapy  -     X-Ray Calcaneus Bilateral; Future    Plantar fasciitis of left foot  -     Cancel: Ambulatory consult to Physical Therapy  -     Ambulatory consult to Physical Therapy  -     X-Ray Calcaneus Bilateral; Future    Tendonitis, Achilles, right  -     Cancel: Ambulatory consult to Physical Therapy  -     Ambulatory consult to Physical Therapy  -     X-Ray Calcaneus Bilateral; Future    Other orders  -     methylPREDNISolone (MEDROL  DOSEPACK) 4 mg tablet; use as directed      I counseled the patient on his conditions, their implications and medical management.    Medrol dose pack for inflammation    Patient will stretch the tendo achilles complex three times daily as demonstrated in the office.  Literature was dispensed illustrating proper stretching technique.    Heel lifts dispensed for the right shoe    Patient will obtain over the counter arch supports and wear them in shoes whenever possible.  Athletic shoes intended for walking or running are usually best. Never to walk barefoot or in flats    Consult to PT for treatment of the equinus that is leading to the plantar fasciitis and achilles tendonitis.     Return in 6 weeks follow up, sooner VIMAL Dorado DPM

## 2019-04-26 ENCOUNTER — HOSPITAL ENCOUNTER (OUTPATIENT)
Dept: RADIOLOGY | Facility: HOSPITAL | Age: 59
Discharge: HOME | End: 2019-04-26
Attending: INTERNAL MEDICINE
Payer: COMMERCIAL

## 2019-04-26 ENCOUNTER — OFFICE VISIT (OUTPATIENT)
Dept: FAMILY MEDICINE | Facility: CLINIC | Age: 59
End: 2019-04-26
Payer: COMMERCIAL

## 2019-04-26 VITALS
HEIGHT: 69 IN | DIASTOLIC BLOOD PRESSURE: 90 MMHG | WEIGHT: 213.63 LBS | TEMPERATURE: 98 F | SYSTOLIC BLOOD PRESSURE: 150 MMHG | HEART RATE: 80 BPM | BODY MASS INDEX: 31.64 KG/M2 | OXYGEN SATURATION: 97 %

## 2019-04-26 DIAGNOSIS — I10 ESSENTIAL HYPERTENSION: Primary | ICD-10-CM

## 2019-04-26 DIAGNOSIS — M25.561 ACUTE PAIN OF RIGHT KNEE: ICD-10-CM

## 2019-04-26 DIAGNOSIS — E78.5 DYSLIPIDEMIA: ICD-10-CM

## 2019-04-26 PROCEDURE — 99214 PR OFFICE/OUTPT VISIT, EST, LEVL IV, 30-39 MIN: ICD-10-PCS | Mod: S$GLB,,, | Performed by: INTERNAL MEDICINE

## 2019-04-26 PROCEDURE — 3077F SYST BP >= 140 MM HG: CPT | Mod: CPTII,S$GLB,, | Performed by: INTERNAL MEDICINE

## 2019-04-26 PROCEDURE — 73562 X-RAY EXAM OF KNEE 3: CPT | Mod: TC,FY,PO,RT

## 2019-04-26 PROCEDURE — 99999 PR PBB SHADOW E&M-EST. PATIENT-LVL IV: CPT | Mod: PBBFAC,,, | Performed by: INTERNAL MEDICINE

## 2019-04-26 PROCEDURE — 3080F PR MOST RECENT DIASTOLIC BLOOD PRESSURE >= 90 MM HG: ICD-10-PCS | Mod: CPTII,S$GLB,, | Performed by: INTERNAL MEDICINE

## 2019-04-26 PROCEDURE — 99214 OFFICE O/P EST MOD 30 MIN: CPT | Mod: S$GLB,,, | Performed by: INTERNAL MEDICINE

## 2019-04-26 PROCEDURE — 3077F PR MOST RECENT SYSTOLIC BLOOD PRESSURE >= 140 MM HG: ICD-10-PCS | Mod: CPTII,S$GLB,, | Performed by: INTERNAL MEDICINE

## 2019-04-26 PROCEDURE — 73562 XR KNEE ORTHO RIGHT: ICD-10-PCS | Mod: 26,RT,, | Performed by: RADIOLOGY

## 2019-04-26 PROCEDURE — 3008F BODY MASS INDEX DOCD: CPT | Mod: CPTII,S$GLB,, | Performed by: INTERNAL MEDICINE

## 2019-04-26 PROCEDURE — 73562 X-RAY EXAM OF KNEE 3: CPT | Mod: 26,RT,, | Performed by: RADIOLOGY

## 2019-04-26 PROCEDURE — 3080F DIAST BP >= 90 MM HG: CPT | Mod: CPTII,S$GLB,, | Performed by: INTERNAL MEDICINE

## 2019-04-26 PROCEDURE — 99999 PR PBB SHADOW E&M-EST. PATIENT-LVL IV: ICD-10-PCS | Mod: PBBFAC,,, | Performed by: INTERNAL MEDICINE

## 2019-04-26 PROCEDURE — 73560 X-RAY EXAM OF KNEE 1 OR 2: CPT | Mod: 26,59,RT, | Performed by: RADIOLOGY

## 2019-04-26 PROCEDURE — 73560 X-RAY EXAM OF KNEE 1 OR 2: CPT | Mod: TC,FY,PO,RT

## 2019-04-26 PROCEDURE — 73560 XR KNEE ORTHO RIGHT: ICD-10-PCS | Mod: 26,59,RT, | Performed by: RADIOLOGY

## 2019-04-26 PROCEDURE — 3008F PR BODY MASS INDEX (BMI) DOCUMENTED: ICD-10-PCS | Mod: CPTII,S$GLB,, | Performed by: INTERNAL MEDICINE

## 2019-04-26 RX ORDER — GLIMEPIRIDE 4 MG/1
8 TABLET ORAL
Qty: 180 TABLET | Refills: 1 | Status: SHIPPED | OUTPATIENT
Start: 2019-04-26 | End: 2019-08-05 | Stop reason: SDUPTHER

## 2019-04-26 RX ORDER — AMLODIPINE BESYLATE 2.5 MG/1
2.5 TABLET ORAL DAILY
Qty: 30 TABLET | Refills: 11 | Status: SHIPPED | OUTPATIENT
Start: 2019-04-26 | End: 2019-08-05 | Stop reason: SDUPTHER

## 2019-04-26 RX ORDER — ATORVASTATIN CALCIUM 20 MG/1
20 TABLET, FILM COATED ORAL DAILY
Qty: 30 TABLET | Refills: 2 | Status: SHIPPED | OUTPATIENT
Start: 2019-04-26 | End: 2019-06-17 | Stop reason: SDUPTHER

## 2019-04-26 RX ORDER — TRAMADOL HYDROCHLORIDE 50 MG/1
50 TABLET ORAL EVERY 6 HOURS
Qty: 10 TABLET | Refills: 0 | Status: SHIPPED | OUTPATIENT
Start: 2019-04-26 | End: 2019-08-05

## 2019-04-26 RX ORDER — HYDROCHLOROTHIAZIDE 12.5 MG/1
12.5 TABLET ORAL DAILY
Qty: 30 TABLET | Refills: 2 | Status: SHIPPED | OUTPATIENT
Start: 2019-04-26 | End: 2019-06-17 | Stop reason: SDUPTHER

## 2019-04-26 RX ORDER — ASPIRIN 81 MG/1
81 TABLET ORAL DAILY
COMMUNITY
Start: 2019-04-26

## 2019-04-26 NOTE — PROGRESS NOTES
Patient ID: Luis Lozano is a 58 y.o. male.    Chief Complaint: Establish Care (RIGHT legpain); Diabetes; and Hospital Follow Up (er visit last night )    HPI  From [place]. . 2 children.  and works at [employer].     PMH: TIIDM, HTN  Surg Hx: none   FHx: reviewed  Social Hx: never smoker, no etoh, no illicits    HTN:   Hctz 12.5 mg  /108  Recheck: 150/90  BP running 140-150 at home  No CP or WALSH  Has been having right leg pain.  -continue Hctz  -add amlodipine 2.5 mg qd  -recheck with nurse in 2 weeks  -labs today    TIIDM:   Glimepiride 8 mg in am  Metformin 500 mg BID  Adherence:  Last a1c: 10.6  BG has not been checking  Last retinal exam: eye exam last year   Last foot exam: next visit  Last microalbumin/creat: ordered  Statin:  Atorvastatin 20 mg daily  ADA diet:  Improving  Diabetic education: unk  -trulicity 0.75 mg q week  -A1c today    Right leg pain:  1.5 weeks. Has been having plantar fasiitis. Right lower back pain and right knee pain.   -knee xray  -tylenol 500 mg + ibuprofen 200  -tramadol 50 mg #10    Health maintenance:  Need records on eye exam.  Will do foot exam next visit.    Social History     Socioeconomic History    Marital status:      Spouse name: Not on file    Number of children: Not on file    Years of education: Not on file    Highest education level: Not on file   Occupational History    Not on file   Social Needs    Financial resource strain: Not on file    Food insecurity:     Worry: Not on file     Inability: Not on file    Transportation needs:     Medical: Not on file     Non-medical: Not on file   Tobacco Use    Smoking status: Never Smoker   Substance and Sexual Activity    Alcohol use: No    Drug use: No    Sexual activity: Not on file   Lifestyle    Physical activity:     Days per week: Not on file     Minutes per session: Not on file    Stress: Not on file   Relationships    Social connections:     Talks on phone: Not on file     Gets  together: Not on file     Attends Alevism service: Not on file     Active member of club or organization: Not on file     Attends meetings of clubs or organizations: Not on file     Relationship status: Not on file   Other Topics Concern    Not on file   Social History Narrative    Not on file     Family History   Problem Relation Age of Onset    Diabetes Mother     Heart disease Mother 63    Lung cancer Father      Current Outpatient Medications on File Prior to Visit   Medication Sig Dispense Refill    metFORMIN (GLUCOPHAGE-XR) 500 MG 24 hr tablet Take 1 po qhs x 2 weeks, then take 1 bid for 2 weeks, then 2 qhs and 1 po qam x 2 weeks, then 2 bid 120 tablet 0    multivitamin (ONE DAILY MULTIVITAMIN) per tablet Take 1 tablet by mouth once daily.      naproxen (NAPROSYN) 500 MG tablet Take 1 tablet (500 mg total) by mouth 2 (two) times daily as needed (pain). Can use this or the Ibuprofen/Aleve 20 tablet 0    [DISCONTINUED] aspirin (ECOTRIN) 81 MG EC tablet Take 81 mg by mouth once daily.      [DISCONTINUED] atorvastatin (LIPITOR) 20 MG tablet Take 1 tablet (20 mg total) by mouth once daily. 30 tablet 6    [DISCONTINUED] glimepiride (AMARYL) 4 MG tablet TAKE TWO TABLETS BY MOUTH ONCE DAILY WITH  BREAKFAST 60 tablet 0    [DISCONTINUED] hydroCHLOROthiazide (HYDRODIURIL) 12.5 MG Tab TAKE ONE TABLET BY MOUTH ONCE DAILY 30 tablet 0    canagliflozin (INVOKANA) 300 mg Tab tablet Take 1 tablet (300 mg total) by mouth once daily. 30 tablet 5    pioglitazone (ACTOS) 30 MG tablet Take 1 tablet (30 mg total) by mouth once daily. 30 tablet 6     No current facility-administered medications on file prior to visit.      I personally reviewed past medical, family and social history.  Review of Systems   Constitutional: Negative for activity change, fever and unexpected weight change.   HENT: Negative for facial swelling, hearing loss and trouble swallowing.    Eyes: Negative for visual disturbance.   Respiratory:  Negative for cough, chest tightness, shortness of breath and wheezing.    Cardiovascular: Negative for chest pain, palpitations and leg swelling.   Gastrointestinal: Negative for abdominal pain, blood in stool, constipation, diarrhea, nausea and vomiting.   Endocrine: Negative for cold intolerance, polydipsia, polyphagia and polyuria.   Genitourinary: Negative for decreased urine volume and dysuria.   Musculoskeletal: Positive for arthralgias. Negative for gait problem and neck pain.        Right knee pain   Skin: Negative for rash.   Neurological: Negative for dizziness, syncope and light-headedness.   Psychiatric/Behavioral: Negative for dysphoric mood. The patient is not nervous/anxious.        Objective:     Vitals:    04/26/19 1100   BP: (!) 150/90   Pulse:    Temp:         Physical Exam   Constitutional: He is oriented to person, place, and time. He appears well-developed and well-nourished. No distress.   HENT:   Head: Normocephalic and atraumatic.   Eyes: Pupils are equal, round, and reactive to light. EOM are normal. Right eye exhibits no discharge. Left eye exhibits no discharge. No scleral icterus.   Neck: Normal range of motion. Neck supple. No JVD present. No thyromegaly present.   Cardiovascular: Normal rate, regular rhythm and normal heart sounds. Exam reveals no gallop and no friction rub.   No murmur heard.  Pulmonary/Chest: Effort normal and breath sounds normal. No respiratory distress. He has no wheezes.   Abdominal: Soft. Bowel sounds are normal. He exhibits no distension and no mass. There is no tenderness.   Musculoskeletal: Normal range of motion. He exhibits no edema.   Lymphadenopathy:     He has no cervical adenopathy.   Neurological: He is alert and oriented to person, place, and time. No cranial nerve deficit. Coordination normal.   Skin: Skin is warm and dry. Capillary refill takes less than 2 seconds. No rash noted. He is not diaphoretic.   Psychiatric: He has a normal mood and affect.  His behavior is normal.       Assessment/Plan   Luis was seen today for establish care, diabetes and hospital follow up.    Diagnoses and all orders for this visit:    Essential hypertension  -     amLODIPine (NORVASC) 2.5 MG tablet; Take 1 tablet (2.5 mg total) by mouth once daily.  -     hydroCHLOROthiazide (HYDRODIURIL) 12.5 MG Tab; Take 1 tablet (12.5 mg total) by mouth once daily.  -     CBC auto differential; Future  -     Lipid panel; Future  -     Comprehensive metabolic panel; Future  -     TSH; Future    Dyslipidemia  -     atorvastatin (LIPITOR) 20 MG tablet; Take 1 tablet (20 mg total) by mouth once daily.    Uncontrolled type 2 diabetes mellitus with peripheral neuropathy  -     dulaglutide (TRULICITY) 0.75 mg/0.5 mL PnIj; Inject 0.5 mLs (0.75 mg total) into the skin every 7 days.    Acute pain of right knee  -     traMADol (ULTRAM) 50 mg tablet; Take 1 tablet (50 mg total) by mouth every 6 (six) hours.  -     X-ray Knee Ortho Right; Future    Other orders  -     aspirin (ECOTRIN) 81 MG EC tablet; Take 1 tablet (81 mg total) by mouth once daily.  -     glimepiride (AMARYL) 4 MG tablet; Take 2 tablets (8 mg total) by mouth before breakfast.

## 2019-04-29 DIAGNOSIS — E11.11 TYPE 2 DIABETES MELLITUS WITH KETOACIDOTIC COMA, WITHOUT LONG-TERM CURRENT USE OF INSULIN: Primary | ICD-10-CM

## 2019-05-04 ENCOUNTER — PATIENT OUTREACH (OUTPATIENT)
Dept: ADMINISTRATIVE | Facility: HOSPITAL | Age: 59
End: 2019-05-04

## 2019-05-04 NOTE — PROGRESS NOTES
Health Maintenance Due   Topic Date Due    TETANUS VACCINE  [birth year]06/1978    Shingles Vaccine (1 of 2) 05/06/2008    Hemoglobin A1c  09/13/2018    Lipid Panel  03/08/2019     Pre-visit outreach via mail

## 2019-05-04 NOTE — LETTER
May 4, 2019    Luis Lozano  [address] [address]             [facility]  [address]  [facility] [address]  Phone: [phone] Dear Mr. Lozano:    [facility] is committed to your overall health.  To help you get the most out of each of your visits, we will review your information to make sure you are up to date on all of your recommended tests and/or procedures.      Collin Payton DO    has found that your chart shows you may be due for the following:    Tetanus Immunization  Shingles Immunization    REMINDER: Fasting lab appointment 5/14/19    Also, You have been selected for enrollment in a Hypertension Digital Medicine Program byCollin Payton DO .  As a participant, you will be able to send home BLOOD PRESSURE readings directly from your smartphone into your medical record at [facility]. Collin Payton DO.  Collin Payton DO will discuss more about the program at your upcoming appointment.    If you have had any of the above done at another facility, please bring the records with you or Fax them to [phone] so that your record at [facility] will be complete. If you have not had any of these tests or procedures done recently and would like to complete this testing ,  please call [phone] or send a message through your [facility] portal to your provider's office.     If you have an upcoming scheduled appointment for the above test and/or procedures, please disregard this letter.    Sincerely,    Jenn Strong, Care Coordinator  [facility]  Phone: [phone]  Fax: [phone]

## 2019-06-17 DIAGNOSIS — E78.5 DYSLIPIDEMIA: ICD-10-CM

## 2019-06-17 DIAGNOSIS — I10 ESSENTIAL HYPERTENSION: ICD-10-CM

## 2019-06-17 RX ORDER — ATORVASTATIN CALCIUM 20 MG/1
20 TABLET, FILM COATED ORAL DAILY
Qty: 30 TABLET | Refills: 2 | Status: SHIPPED | OUTPATIENT
Start: 2019-06-17 | End: 2019-07-14

## 2019-06-17 RX ORDER — METFORMIN HYDROCHLORIDE 500 MG/1
TABLET, EXTENDED RELEASE ORAL
Qty: 120 TABLET | Refills: 0 | Status: SHIPPED | OUTPATIENT
Start: 2019-06-17 | End: 2019-08-05 | Stop reason: SDUPTHER

## 2019-06-17 RX ORDER — HYDROCHLOROTHIAZIDE 12.5 MG/1
12.5 TABLET ORAL DAILY
Qty: 30 TABLET | Refills: 2 | Status: SHIPPED | OUTPATIENT
Start: 2019-06-17 | End: 2019-08-05 | Stop reason: SDUPTHER

## 2019-06-17 NOTE — TELEPHONE ENCOUNTER
----- Message from Marichuy Enriquez sent at 6/17/2019 12:23 PM CDT -----  Contact: self   Patient need a refill on metFORMin, hydroCHLOROthiazide 12.5 MG, atorvastatin  20 MG  30 day supply please send to [facility] in [place], any questions please call back at [phone] (home)     [facility] [address] [place] [id number]  Phone: [phone] Fax: [phone]

## 2019-06-21 ENCOUNTER — TELEPHONE (OUTPATIENT)
Dept: ADMINISTRATIVE | Facility: HOSPITAL | Age: 59
End: 2019-06-21

## 2019-07-14 DIAGNOSIS — E78.5 DYSLIPIDEMIA: ICD-10-CM

## 2019-07-14 RX ORDER — ATORVASTATIN CALCIUM 20 MG/1
TABLET, FILM COATED ORAL
Qty: 30 TABLET | Refills: 2 | Status: SHIPPED | OUTPATIENT
Start: 2019-07-14 | End: 2019-08-05 | Stop reason: SDUPTHER

## 2019-08-05 ENCOUNTER — PATIENT MESSAGE (OUTPATIENT)
Dept: ADMINISTRATIVE | Facility: OTHER | Age: 59
End: 2019-08-05

## 2019-08-05 ENCOUNTER — LAB VISIT (OUTPATIENT)
Dept: LAB | Facility: HOSPITAL | Age: 59
End: 2019-08-05
Attending: FAMILY MEDICINE
Payer: COMMERCIAL

## 2019-08-05 ENCOUNTER — OFFICE VISIT (OUTPATIENT)
Dept: FAMILY MEDICINE | Facility: CLINIC | Age: 59
End: 2019-08-05
Payer: COMMERCIAL

## 2019-08-05 VITALS
TEMPERATURE: 98 F | BODY MASS INDEX: 31.01 KG/M2 | DIASTOLIC BLOOD PRESSURE: 84 MMHG | SYSTOLIC BLOOD PRESSURE: 134 MMHG | HEIGHT: 69 IN | WEIGHT: 209.38 LBS | HEART RATE: 68 BPM

## 2019-08-05 DIAGNOSIS — E55.9 VITAMIN D DEFICIENCY: ICD-10-CM

## 2019-08-05 DIAGNOSIS — J32.9 SINUSITIS, UNSPECIFIED CHRONICITY, UNSPECIFIED LOCATION: ICD-10-CM

## 2019-08-05 DIAGNOSIS — M54.16 RADICULOPATHY, LUMBAR REGION: Primary | ICD-10-CM

## 2019-08-05 DIAGNOSIS — E78.5 DYSLIPIDEMIA: ICD-10-CM

## 2019-08-05 DIAGNOSIS — I10 ESSENTIAL HYPERTENSION: ICD-10-CM

## 2019-08-05 DIAGNOSIS — Z79.899 ENCOUNTER FOR LONG-TERM (CURRENT) USE OF MEDICATIONS: ICD-10-CM

## 2019-08-05 PROBLEM — M72.2 PLANTAR FASCIITIS: Status: ACTIVE | Noted: 2018-12-14

## 2019-08-05 PROBLEM — R09.81 SINUS CONGESTION: Status: ACTIVE | Noted: 2018-03-23

## 2019-08-05 LAB
25(OH)D3+25(OH)D2 SERPL-MCNC: 34 NG/ML (ref 30–96)
ALBUMIN SERPL BCP-MCNC: 4.2 G/DL (ref 3.5–5.2)
ALP SERPL-CCNC: 50 U/L (ref 55–135)
ALT SERPL W/O P-5'-P-CCNC: 16 U/L (ref 10–44)
ANION GAP SERPL CALC-SCNC: 9 MMOL/L (ref 8–16)
AST SERPL-CCNC: 15 U/L (ref 10–40)
BASOPHILS # BLD AUTO: 0.12 K/UL (ref 0–0.2)
BASOPHILS NFR BLD: 1.2 % (ref 0–1.9)
BILIRUB SERPL-MCNC: 1 MG/DL (ref 0.1–1)
BILIRUB UR QL STRIP: NEGATIVE
BUN SERPL-MCNC: 14 MG/DL (ref 8–23)
CALCIUM SERPL-MCNC: 9.8 MG/DL (ref 8.7–10.5)
CHLORIDE SERPL-SCNC: 98 MMOL/L (ref 95–110)
CHOLEST SERPL-MCNC: 185 MG/DL (ref 120–199)
CHOLEST/HDLC SERPL: 3.2 {RATIO} (ref 2–5)
CLARITY UR: CLEAR
CO2 SERPL-SCNC: 31 MMOL/L (ref 23–29)
COLOR UR: YELLOW
CREAT SERPL-MCNC: 1 MG/DL (ref 0.5–1.4)
DIFFERENTIAL METHOD: ABNORMAL
EOSINOPHIL # BLD AUTO: 0.2 K/UL (ref 0–0.5)
EOSINOPHIL NFR BLD: 1.5 % (ref 0–8)
ERYTHROCYTE [DISTWIDTH] IN BLOOD BY AUTOMATED COUNT: 13.6 % (ref 11.5–14.5)
EST. GFR  (AFRICAN AMERICAN): >60 ML/MIN/1.73 M^2
EST. GFR  (NON AFRICAN AMERICAN): >60 ML/MIN/1.73 M^2
ESTIMATED AVG GLUCOSE: 174 MG/DL (ref 68–131)
GLUCOSE SERPL-MCNC: 121 MG/DL (ref 70–110)
GLUCOSE UR QL STRIP: ABNORMAL
HBA1C MFR BLD HPLC: 7.7 % (ref 4–5.6)
HCT VFR BLD AUTO: 48.8 % (ref 40–54)
HDLC SERPL-MCNC: 57 MG/DL (ref 40–75)
HDLC SERPL: 30.8 % (ref 20–50)
HGB BLD-MCNC: 16 G/DL (ref 14–18)
HGB UR QL STRIP: NEGATIVE
IMM GRANULOCYTES # BLD AUTO: 0.05 K/UL (ref 0–0.04)
IMM GRANULOCYTES NFR BLD AUTO: 0.5 % (ref 0–0.5)
KETONES UR QL STRIP: NEGATIVE
LDLC SERPL CALC-MCNC: 111.4 MG/DL (ref 63–159)
LEUKOCYTE ESTERASE UR QL STRIP: NEGATIVE
LYMPHOCYTES # BLD AUTO: 2.7 K/UL (ref 1–4.8)
LYMPHOCYTES NFR BLD: 26 % (ref 18–48)
MCH RBC QN AUTO: 29.9 PG (ref 27–31)
MCHC RBC AUTO-ENTMCNC: 32.8 G/DL (ref 32–36)
MCV RBC AUTO: 91 FL (ref 82–98)
MONOCYTES # BLD AUTO: 0.9 K/UL (ref 0.3–1)
MONOCYTES NFR BLD: 8.5 % (ref 4–15)
NEUTROPHILS # BLD AUTO: 6.4 K/UL (ref 1.8–7.7)
NEUTROPHILS NFR BLD: 62.3 % (ref 38–73)
NITRITE UR QL STRIP: NEGATIVE
NONHDLC SERPL-MCNC: 128 MG/DL
NRBC BLD-RTO: 0 /100 WBC
PH UR STRIP: 7 [PH] (ref 5–8)
PLATELET # BLD AUTO: 265 K/UL (ref 150–350)
PMV BLD AUTO: 11.1 FL (ref 9.2–12.9)
POTASSIUM SERPL-SCNC: 3.6 MMOL/L (ref 3.5–5.1)
PROT SERPL-MCNC: 8 G/DL (ref 6–8.4)
PROT UR QL STRIP: NEGATIVE
RBC # BLD AUTO: 5.36 M/UL (ref 4.6–6.2)
SODIUM SERPL-SCNC: 138 MMOL/L (ref 136–145)
SP GR UR STRIP: 1.01 (ref 1–1.03)
T3FREE SERPL-MCNC: 2.6 PG/ML (ref 2.3–4.2)
T4 SERPL-MCNC: 7.2 UG/DL (ref 4.5–11.5)
TRIGL SERPL-MCNC: 83 MG/DL (ref 30–150)
TSH SERPL DL<=0.005 MIU/L-ACNC: 0.8 UIU/ML (ref 0.4–4)
URN SPEC COLLECT METH UR: ABNORMAL
WBC # BLD AUTO: 10.27 K/UL (ref 3.9–12.7)

## 2019-08-05 PROCEDURE — 85025 COMPLETE CBC W/AUTO DIFF WBC: CPT

## 2019-08-05 PROCEDURE — 3008F BODY MASS INDEX DOCD: CPT | Mod: CPTII,S$GLB,, | Performed by: FAMILY MEDICINE

## 2019-08-05 PROCEDURE — 99999 PR PBB SHADOW E&M-EST. PATIENT-LVL IV: ICD-10-PCS | Mod: PBBFAC,,, | Performed by: FAMILY MEDICINE

## 2019-08-05 PROCEDURE — 3045F PR MOST RECENT HEMOGLOBIN A1C LEVEL 7.0-9.0%: CPT | Mod: CPTII,S$GLB,, | Performed by: FAMILY MEDICINE

## 2019-08-05 PROCEDURE — 80061 LIPID PANEL: CPT

## 2019-08-05 PROCEDURE — 80053 COMPREHEN METABOLIC PANEL: CPT

## 2019-08-05 PROCEDURE — 82306 VITAMIN D 25 HYDROXY: CPT

## 2019-08-05 PROCEDURE — 99214 OFFICE O/P EST MOD 30 MIN: CPT | Mod: S$GLB,,, | Performed by: FAMILY MEDICINE

## 2019-08-05 PROCEDURE — 84443 ASSAY THYROID STIM HORMONE: CPT

## 2019-08-05 PROCEDURE — 81002 URINALYSIS NONAUTO W/O SCOPE: CPT | Mod: PO

## 2019-08-05 PROCEDURE — 99214 PR OFFICE/OUTPT VISIT, EST, LEVL IV, 30-39 MIN: ICD-10-PCS | Mod: S$GLB,,, | Performed by: FAMILY MEDICINE

## 2019-08-05 PROCEDURE — 84436 ASSAY OF TOTAL THYROXINE: CPT

## 2019-08-05 PROCEDURE — 84481 FREE ASSAY (FT-3): CPT

## 2019-08-05 PROCEDURE — 3079F PR MOST RECENT DIASTOLIC BLOOD PRESSURE 80-89 MM HG: ICD-10-PCS | Mod: CPTII,S$GLB,, | Performed by: FAMILY MEDICINE

## 2019-08-05 PROCEDURE — 3008F PR BODY MASS INDEX (BMI) DOCUMENTED: ICD-10-PCS | Mod: CPTII,S$GLB,, | Performed by: FAMILY MEDICINE

## 2019-08-05 PROCEDURE — 36415 COLL VENOUS BLD VENIPUNCTURE: CPT | Mod: PO

## 2019-08-05 PROCEDURE — 82043 UR ALBUMIN QUANTITATIVE: CPT

## 2019-08-05 PROCEDURE — 3075F SYST BP GE 130 - 139MM HG: CPT | Mod: CPTII,S$GLB,, | Performed by: FAMILY MEDICINE

## 2019-08-05 PROCEDURE — 3075F PR MOST RECENT SYSTOLIC BLOOD PRESS GE 130-139MM HG: ICD-10-PCS | Mod: CPTII,S$GLB,, | Performed by: FAMILY MEDICINE

## 2019-08-05 PROCEDURE — 3079F DIAST BP 80-89 MM HG: CPT | Mod: CPTII,S$GLB,, | Performed by: FAMILY MEDICINE

## 2019-08-05 PROCEDURE — 3045F PR MOST RECENT HEMOGLOBIN A1C LEVEL 7.0-9.0%: ICD-10-PCS | Mod: CPTII,S$GLB,, | Performed by: FAMILY MEDICINE

## 2019-08-05 PROCEDURE — 99999 PR PBB SHADOW E&M-EST. PATIENT-LVL IV: CPT | Mod: PBBFAC,,, | Performed by: FAMILY MEDICINE

## 2019-08-05 PROCEDURE — 83036 HEMOGLOBIN GLYCOSYLATED A1C: CPT

## 2019-08-05 RX ORDER — PREDNISONE 20 MG/1
20 TABLET ORAL DAILY
Qty: 10 TABLET | Refills: 0 | Status: SHIPPED | OUTPATIENT
Start: 2019-08-05 | End: 2019-08-15

## 2019-08-05 RX ORDER — GABAPENTIN 300 MG/1
300 CAPSULE ORAL 3 TIMES DAILY
Refills: 3 | COMMUNITY
Start: 2019-06-17 | End: 2019-08-05 | Stop reason: SDUPTHER

## 2019-08-05 RX ORDER — AMLODIPINE BESYLATE 2.5 MG/1
2.5 TABLET ORAL DAILY
Qty: 90 TABLET | Refills: 3 | Status: SHIPPED | OUTPATIENT
Start: 2019-08-05 | End: 2019-11-27 | Stop reason: ALTCHOICE

## 2019-08-05 RX ORDER — METFORMIN HYDROCHLORIDE 500 MG/1
TABLET, EXTENDED RELEASE ORAL
Qty: 180 TABLET | Refills: 3 | Status: SHIPPED | OUTPATIENT
Start: 2019-08-05 | End: 2019-11-27

## 2019-08-05 RX ORDER — HYDROCHLOROTHIAZIDE 12.5 MG/1
12.5 TABLET ORAL DAILY
Qty: 90 TABLET | Refills: 3 | Status: SHIPPED | OUTPATIENT
Start: 2019-08-05 | End: 2020-08-24

## 2019-08-05 RX ORDER — GLIMEPIRIDE 4 MG/1
8 TABLET ORAL
Qty: 180 TABLET | Refills: 3 | Status: SHIPPED | OUTPATIENT
Start: 2019-08-05 | End: 2019-11-27

## 2019-08-05 RX ORDER — GABAPENTIN 300 MG/1
300 CAPSULE ORAL 3 TIMES DAILY
Qty: 270 CAPSULE | Refills: 3 | Status: SHIPPED | OUTPATIENT
Start: 2019-08-05 | End: 2020-09-09 | Stop reason: SDUPTHER

## 2019-08-05 RX ORDER — AMOXICILLIN AND CLAVULANATE POTASSIUM 875; 125 MG/1; MG/1
1 TABLET, FILM COATED ORAL 2 TIMES DAILY
Qty: 28 TABLET | Refills: 0 | Status: SHIPPED | OUTPATIENT
Start: 2019-08-05 | End: 2019-08-18 | Stop reason: ALTCHOICE

## 2019-08-05 RX ORDER — ATORVASTATIN CALCIUM 20 MG/1
20 TABLET, FILM COATED ORAL DAILY
Qty: 90 TABLET | Refills: 3 | Status: SHIPPED | OUTPATIENT
Start: 2019-08-05 | End: 2019-08-08

## 2019-08-05 NOTE — PROGRESS NOTES
Your urinalysis is abnormal.  Showing trace glucose likely from diabetes.  I recommend starting Trulicity as we discussed.  We will discuss your other test results as they return.

## 2019-08-06 DIAGNOSIS — E11.9 TYPE 2 DIABETES MELLITUS: ICD-10-CM

## 2019-08-06 LAB
ALBUMIN/CREAT UR: 9.2 UG/MG (ref 0–30)
CREAT UR-MCNC: 152 MG/DL (ref 23–375)
MICROALBUMIN UR DL<=1MG/L-MCNC: 14 UG/ML

## 2019-08-06 NOTE — PROGRESS NOTES
Patient needs labs and appointment in 3 months.  Send a prescription for atorvastatin 40 mg.  I have already restarted Trulicity    Patient's results were released through my chart and was notified.  Please follow up to make sure that they received the results.      I have reviewed your recent blood work.    Your complete blood count is stable within normal.    Your metabolic panel which shows a glucose kidney function electrolytes and liver function is stable within normal limits.  Your glucose was slightly elevated.  Thyroid studies are stable within normal.   Your cholesterol is improved but LDL is still not less than 100.  Increase atorvastatin to 40 mg..    Your vitamin-D is normal .  Your hemoglobin A1c is improved from previous.  However I still recommend starting Trulicity as discussed.  Repeat in 3 months..   We will discuss these results in detail at your next office visit.  Please let me know if you have any questions concerning these laboratory results.

## 2019-08-06 NOTE — PROGRESS NOTES
Normal microalbumin.  Continue to control diabetes.  Start Trulicity as discussed.  Repeat these numbers in 3 months.

## 2019-08-06 NOTE — ASSESSMENT & PLAN NOTE
Checking labs.  Restart Trulicity.  Continue other medications. Monitor hemoglobin A1c.  Discussed diabetic diet and exercise protocol.  Continue medications.  Monitor for side effects.  Discussed checking blood glucose.  Discussed symptoms to monitor for and to notify me immediately if persistent or worsening.  Follow up with Ophthalmology/Optometry and Podiatry.

## 2019-08-06 NOTE — ASSESSMENT & PLAN NOTE
Medication refills.  Blood pressure well controlled today.  Continue current regimen.  Checking labs.

## 2019-08-06 NOTE — ASSESSMENT & PLAN NOTE
Consistent with sinusitis.  Augmentin for antibiotic.  Recommend short-term dose of prednisone while watching glucose readings.

## 2019-08-06 NOTE — PROGRESS NOTES
Subjective:      Patient ID: Luis Lozano is a 59 y.o. male.    Chief Complaint: Establish Care (refill medications) and Sinusitis      Problem List Items Addressed This Visit     Hypertensive disorder    Overview     Chronic.  Stable.  Patient taking hydrochlorothiazide 12.5 mg amlodipine 2.5 mg.  Reports compliance.  No side effects reported.  No symptoms of chest pain shortness of breath blurry vision etc.         Current Assessment & Plan     Medication refills.  Blood pressure well controlled today.  Continue current regimen.  Checking labs.         Uncontrolled type 2 diabetes mellitus with peripheral neuropathy    Overview     Chronic.  Control is uncertain.  Due for hemoglobin A1c.  Patient is seeing eye doctor and foot doctor.  Patient is taking glimepiride Tradjenta and metformin extended release 500 mg.  Patient has not started his Trulicity yet.  Patient does check his blood sugar.  He is eligible for the [employer] diabetes program is interested.         Current Assessment & Plan     Checking labs.  Restart Trulicity.  Continue other medications. Monitor hemoglobin A1c.  Discussed diabetic diet and exercise protocol.  Continue medications.  Monitor for side effects.  Discussed checking blood glucose.  Discussed symptoms to monitor for and to notify me immediately if persistent or worsening.  Follow up with Ophthalmology/Optometry and Podiatry.           Dyslipidemia    Overview     Chronic.  Control is uncertain.  Due for labs.  On atorvastatin 20 mg.  Reports compliance.  No side effects reported.         Current Assessment & Plan     Check lipid panel.  Continue atorvastatin 20 mg for now.  Adjust as needed.         Uncontrolled type 2 diabetes mellitus without complication, without long-term current use of insulin    Overview     See diabetes above.         Current Assessment & Plan     As above          Radiculopathy, lumbar region - Primary    Overview     Chronic.  Stable.  Patient sees  pain management.  Taking gabapentin 300 mg per medication list.  Needs refill.  Reports compliance.  No side effects reported.         Current Assessment & Plan     Refill gabapentin,. follow-up pain management.         Sinusitis    Overview     Acute.  Started about 1 to 2 weeks ago.  Was getting better but now is worse.  Patient has tried over-the-counter without relief.  Having sinus pressure here pressure headache and postnasal drainage         Current Assessment & Plan     Consistent with sinusitis.  Augmentin for antibiotic.  Recommend short-term dose of prednisone while watching glucose readings.         Vitamin D deficiency    Overview     Chronic.  Control is uncertain.  Due for vitamin-D level.           Encounter for long-term (current) use of medications    Overview     08/06/2019   Patient is on CHRONIC long-term drug therapy for managed conditions. See medication list. Reports compliance.  No side effects reported.  Routine lab work is being monitored.  Patient does  need refills today.     Lab Results   Component Value Date    WBC 10.27 08/05/2019    HGB 16.0 08/05/2019    HCT 48.8 08/05/2019    MCV 91 08/05/2019     08/05/2019      CMP  Sodium   Date Value Ref Range Status   08/05/2019 138 136 - 145 mmol/L Final     Potassium   Date Value Ref Range Status   08/05/2019 3.6 3.5 - 5.1 mmol/L Final     Chloride   Date Value Ref Range Status   08/05/2019 98 95 - 110 mmol/L Final     CO2   Date Value Ref Range Status   08/05/2019 31 (H) 23 - 29 mmol/L Final     Glucose   Date Value Ref Range Status   08/05/2019 121 (H) 70 - 110 mg/dL Final     BUN, Bld   Date Value Ref Range Status   08/05/2019 14 8 - 23 mg/dL Final     Creatinine   Date Value Ref Range Status   08/05/2019 1.0 0.5 - 1.4 mg/dL Final     Calcium   Date Value Ref Range Status   08/05/2019 9.8 8.7 - 10.5 mg/dL Final     Total Protein   Date Value Ref Range Status   08/05/2019 8.0 6.0 - 8.4 g/dL Final     Albumin   Date Value Ref Range  Status   08/05/2019 4.2 3.5 - 5.2 g/dL Final     Total Bilirubin   Date Value Ref Range Status   08/05/2019 1.0 0.1 - 1.0 mg/dL Final     Comment:     For infants and newborns, interpretation of results should be based  on gestational age, weight and in agreement with clinical  observations.  Premature Infant recommended reference ranges:  Up to 24 hours.............<8.0 mg/dL  Up to 48 hours............<12.0 mg/dL  3-5 days..................<15.0 mg/dL  6-29 days.................<15.0 mg/dL       Alkaline Phosphatase   Date Value Ref Range Status   08/05/2019 50 (L) 55 - 135 U/L Final     AST   Date Value Ref Range Status   08/05/2019 15 10 - 40 U/L Final     ALT   Date Value Ref Range Status   08/05/2019 16 10 - 44 U/L Final     Anion Gap   Date Value Ref Range Status   08/05/2019 9 8 - 16 mmol/L Final     eGFR if    Date Value Ref Range Status   08/05/2019 >60.0 >60 mL/min/1.73 m^2 Final     eGFR if non    Date Value Ref Range Status   08/05/2019 >60.0 >60 mL/min/1.73 m^2 Final     Comment:     Calculation used to obtain the estimated glomerular filtration  rate (eGFR) is the CKD-EPI equation.        Lab Results   Component Value Date    TSH 0.796 08/05/2019               Current Assessment & Plan     Complete history and physical was completed today.  Complete and thorough medication reconciliation was performed.  Discussed risks and benefits of medications.  Advised patient on orders and health maintenance.  We discussed old records and old labs if available.  Will request any records not available through epic.  Continue current medications listed on your summary sheet.                  Past Medical History:  Past Medical History:   Diagnosis Date    Diabetes mellitus, type 2     Hypertension      History reviewed. No pertinent surgical history.  Review of patient's allergies indicates:  No Known Allergies  Current Outpatient Medications on File Prior to Visit   Medication Sig  Dispense Refill    aspirin (ECOTRIN) 81 MG EC tablet Take 1 tablet (81 mg total) by mouth once daily.      multivitamin (ONE DAILY MULTIVITAMIN) per tablet Take 1 tablet by mouth once daily.       No current facility-administered medications on file prior to visit.      Social History     Socioeconomic History    Marital status:      Spouse name: Not on file    Number of children: Not on file    Years of education: Not on file    Highest education level: Not on file   Occupational History    Not on file   Social Needs    Financial resource strain: Very hard    Food insecurity:     Worry: Never true     Inability: Never true    Transportation needs:     Medical: No     Non-medical: No   Tobacco Use    Smoking status: Never Smoker   Substance and Sexual Activity    Alcohol use: No     Frequency: Never     Drinks per session: 1 or 2     Binge frequency: Never    Drug use: No    Sexual activity: Not on file   Lifestyle    Physical activity:     Days per week: Not on file     Minutes per session: Not on file    Stress: Not on file   Relationships    Social connections:     Talks on phone: More than three times a week     Gets together: Never     Attends Religion service: More than 4 times per year     Active member of club or organization: Yes     Attends meetings of clubs or organizations: More than 4 times per year     Relationship status:    Other Topics Concern    Not on file   Social History Narrative    Not on file     Family History   Problem Relation Age of Onset    Diabetes Mother     Heart disease Mother 63    Lung cancer Father        I have reviewed the complete PMH, social history, surgical history, allergies and medications.  As well as family history.    Review of Systems   Constitutional: Negative for activity change and unexpected weight change.   HENT: Positive for ear pain, postnasal drip, sinus pressure and sinus pain. Negative for hearing loss, rhinorrhea and  "trouble swallowing.    Eyes: Negative for discharge and visual disturbance.   Respiratory: Negative for chest tightness and wheezing.    Cardiovascular: Negative for chest pain and palpitations.   Gastrointestinal: Negative for blood in stool, constipation, diarrhea and vomiting.   Endocrine: Negative for polydipsia and polyuria.   Genitourinary: Negative for difficulty urinating, hematuria and urgency.   Musculoskeletal: Negative for arthralgias, joint swelling and neck pain.   Neurological: Negative for weakness and headaches.   Psychiatric/Behavioral: Negative for confusion and dysphoric mood.       Objective:     /84   Pulse 68   Temp 98.2 °F (36.8 °C) (Oral)   Ht 5' 9" (1.753 m)   Wt 95 kg (209 lb 6.4 oz)   BMI 30.92 kg/m²     Physical Exam   Constitutional: He is oriented to person, place, and time. He appears well-developed and well-nourished. No distress.   HENT:   Head: Normocephalic and atraumatic.   Right Ear: Tympanic membrane is not erythematous. A middle ear effusion is present.   Left Ear: Tympanic membrane is not erythematous. A middle ear effusion is present.   Nose: Rhinorrhea and sinus tenderness present. Right sinus exhibits frontal sinus tenderness. Left sinus exhibits frontal sinus tenderness.   Mouth/Throat: Posterior oropharyngeal erythema present.   Eyes: Pupils are equal, round, and reactive to light. EOM are normal.   Neck: Normal range of motion. Neck supple.   Cardiovascular: Normal rate, regular rhythm, normal heart sounds and intact distal pulses.   No murmur heard.  Pulmonary/Chest: Effort normal and breath sounds normal. No respiratory distress. He has no wheezes.   Musculoskeletal: Normal range of motion. He exhibits no edema.   Neurological: He is alert and oriented to person, place, and time. No cranial nerve deficit.   Skin: Skin is warm and dry. Capillary refill takes less than 2 seconds.   Psychiatric: He has a normal mood and affect. His behavior is normal. "   Nursing note and vitals reviewed.      Assessment:     1. Radiculopathy, lumbar region    2. Essential hypertension    3. Dyslipidemia    4. Uncontrolled type 2 diabetes mellitus without complication, without long-term current use of insulin    5. Uncontrolled type 2 diabetes mellitus with peripheral neuropathy    6. Sinusitis, unspecified chronicity, unspecified location    7. Vitamin D deficiency    8. Encounter for long-term (current) use of medications        Plan:     I have Reviewed and summarized old records.  I have performed thorough medication reconciliation today and discussed risk and benefits of each medication.  I have reviewed labs and discussed with patient.  All questions were answered.  I am requesting old records and will review them once they are available.    Problem List Items Addressed This Visit     Hypertensive disorder     Medication refills.  Blood pressure well controlled today.  Continue current regimen.  Checking labs.         Relevant Medications    amLODIPine (NORVASC) 2.5 MG tablet    hydroCHLOROthiazide (HYDRODIURIL) 12.5 MG Tab    Other Relevant Orders    Hypertension [facility] ([facility]) Enrollment Order (Completed)    Hypertension Digital Medicine ([facility]): Assign Onboarding Questionnaires (Completed)    Comprehensive metabolic panel (Completed)    Lipid panel (Completed)    TSH (Completed)    T3, free (Completed)    T4 (Completed)    URINALYSIS (Completed)    Uncontrolled type 2 diabetes mellitus with peripheral neuropathy     Checking labs.  Restart Trulicity.  Continue other medications. Monitor hemoglobin A1c.  Discussed diabetic diet and exercise protocol.  Continue medications.  Monitor for side effects.  Discussed checking blood glucose.  Discussed symptoms to monitor for and to notify me immediately if persistent or worsening.  Follow up with Ophthalmology/Optometry and Podiatry.           Relevant Medications    glimepiride (AMARYL) 4 MG tablet    linaGLIPtin  (TRADJENTA) 5 mg Tab tablet    metFORMIN (GLUCOPHAGE-XR) 500 MG 24 hr tablet    dulaglutide (TRULICITY) 0.75 mg/0.5 mL PnIj    Other Relevant Orders    Comprehensive metabolic panel (Completed)    Hemoglobin A1c (Completed)    Lipid panel (Completed)    TSH (Completed)    T3, free (Completed)    T4 (Completed)    Vitamin D (Completed)    MICROALBUMIN / CREATININE RATIO URINE (Completed)    URINALYSIS (Completed)    Dyslipidemia     Check lipid panel.  Continue atorvastatin 20 mg for now.  Adjust as needed.         Relevant Medications    atorvastatin (LIPITOR) 20 MG tablet    Other Relevant Orders    Lipid panel (Completed)    Uncontrolled type 2 diabetes mellitus without complication, without long-term current use of insulin     As above          Relevant Medications    glimepiride (AMARYL) 4 MG tablet    linaGLIPtin (TRADJENTA) 5 mg Tab tablet    metFORMIN (GLUCOPHAGE-XR) 500 MG 24 hr tablet    dulaglutide (TRULICITY) 0.75 mg/0.5 mL PnIj    Other Relevant Orders    Diabetes Digital Medicine (DDMP) Enrollment Order (Completed)    Diabetes Digital Medicine (DDMP): Assign Onboarding Questionnaires (Completed)    Comprehensive metabolic panel (Completed)    Hemoglobin A1c (Completed)    Lipid panel (Completed)    TSH (Completed)    T3, free (Completed)    T4 (Completed)    Vitamin D (Completed)    MICROALBUMIN / CREATININE RATIO URINE (Completed)    URINALYSIS (Completed)    Ambulatory referral to Optometry    Radiculopathy, lumbar region - Primary     Refill gabapentin,. follow-up pain management.         Relevant Medications    gabapentin (NEURONTIN) 300 MG capsule    Sinusitis     Consistent with sinusitis.  Augmentin for antibiotic.  Recommend short-term dose of prednisone while watching glucose readings.         Relevant Medications    amoxicillin-clavulanate 875-125mg (AUGMENTIN) 875-125 mg per tablet    predniSONE (DELTASONE) 20 MG tablet    Vitamin D deficiency    Relevant Orders    Vitamin D (Completed)     Encounter for long-term (current) use of medications     Complete history and physical was completed today.  Complete and thorough medication reconciliation was performed.  Discussed risks and benefits of medications.  Advised patient on orders and health maintenance.  We discussed old records and old labs if available.  Will request any records not available through epic.  Continue current medications listed on your summary sheet.           Relevant Orders    CBC auto differential (Completed)    Comprehensive metabolic panel (Completed)    Hemoglobin A1c (Completed)    Lipid panel (Completed)    TSH (Completed)    T3, free (Completed)    T4 (Completed)    Vitamin D (Completed)    MICROALBUMIN / CREATININE RATIO URINE (Completed)    URINALYSIS (Completed)          Follow up in about 2 weeks (around 8/19/2019), or lab results .    DISCLAIMER: This note was compiled by using a speech recognition dictation system and therefore please be aware that typographical / speech recognition errors can and do occur.  Please contact me if you see any errors specifically.    Charles Nash MD  We Offer Telehealth & Same Day Appointments!   Book your Telehealth appointment with me through my nurse or   Clinic appointments on [employer]!    Office: [phone]          Check out my Facebook Page and Follow Me at: CLICK HERE    Check out my website at [employer] by clicking on: CLICK HERE    To Schedule appointments online, go to [employer]: CLICK HERE     Location: [email or web address]    [address] [place] [address]    FAX: [phone]

## 2019-08-08 ENCOUNTER — PATIENT OUTREACH (OUTPATIENT)
Dept: ADMINISTRATIVE | Facility: HOSPITAL | Age: 59
End: 2019-08-08

## 2019-08-08 DIAGNOSIS — E78.5 HYPERLIPIDEMIA, UNSPECIFIED HYPERLIPIDEMIA TYPE: ICD-10-CM

## 2019-08-08 DIAGNOSIS — E11.8 TYPE 2 DIABETES MELLITUS WITH COMPLICATION, UNSPECIFIED WHETHER LONG TERM INSULIN USE: Primary | ICD-10-CM

## 2019-08-08 RX ORDER — ATORVASTATIN CALCIUM 40 MG/1
40 TABLET, FILM COATED ORAL DAILY
Qty: 90 TABLET | Refills: 3 | Status: SHIPPED | OUTPATIENT
Start: 2019-08-08 | End: 2020-08-13

## 2019-08-08 NOTE — PROGRESS NOTES
Health Maintenance reviewed. Attempted to contact pt concerning last eye exam. No answer. Left VM.

## 2019-08-08 NOTE — TELEPHONE ENCOUNTER
----- Message from Charles Nash MD sent at 8/6/2019  8:08 AM CDT -----  Patient needs labs and appointment in 3 months.  Send a prescription for atorvastatin 40 mg.  I have already restarted Trulicity    Patient's results were released through my chart and was notified.  Please follow up to make sure that they received the results.      I have reviewed your recent blood work.    Your complete blood count is stable within normal.    Your metabolic panel which shows a glucose kidney function electrolytes and liver function is stable within normal limits.  Your glucose was slightly elevated.  Thyroid studies are stable within normal.   Your cholesterol is improved but LDL is still not less than 100.  Increase atorvastatin to 40 mg..    Your vitamin-D is normal .  Your hemoglobin A1c is improved from previous.  However I still recommend starting Trulicity as discussed.  Repeat in 3 months..   We will discuss these results in detail at your next office visit.  Please let me know if you have any questions concerning these laboratory results.

## 2019-08-13 DIAGNOSIS — E11.9 TYPE 2 DIABETES MELLITUS: ICD-10-CM

## 2019-09-18 ENCOUNTER — TELEPHONE (OUTPATIENT)
Dept: FAMILY MEDICINE | Facility: CLINIC | Age: 59
End: 2019-09-18

## 2019-09-18 NOTE — TELEPHONE ENCOUNTER
----- Message from Megan Villarreal sent at 9/18/2019 11:50 AM CDT -----  Contact: pt wife  Stated he will like to change the date of his appointment but the orders exp on 11/08, he will like to be seen on 11/22 for the test, he can be reached at [phone] or [phone]  Thanks

## 2019-09-18 NOTE — TELEPHONE ENCOUNTER
Attempted to return call to patient or his wife to reschedule an appointment, left a message for patient or wife to call clinic back.

## 2019-09-19 NOTE — TELEPHONE ENCOUNTER
Patient would like to change the date of his lab appointment however his hgba1c order expires on 11/08/2019 and patient needs to come in the clinic for his labs on 11/22/2019.  Is there anyway we can extend to hgba1c order to the 22nd of November?  Please advise

## 2019-09-19 NOTE — TELEPHONE ENCOUNTER
It appears that the patient has multiple A1c orders and her order review. Why can you not use 1 of those orders?

## 2019-11-14 ENCOUNTER — TELEPHONE (OUTPATIENT)
Dept: FAMILY MEDICINE | Facility: CLINIC | Age: 59
End: 2019-11-14

## 2019-11-14 NOTE — TELEPHONE ENCOUNTER
Called and left message on recorder for patient to call clinic back in an attempt to be seen sooner that appointment time due to ER visits and Dr. Nash's recommendation to come to clinic sooner.

## 2019-11-14 NOTE — TELEPHONE ENCOUNTER
----- Message from Charles Nash MD sent at 11/14/2019  6:06 AM CST -----  Regarding: ER follow-up  Patient has had two ER visits since our last visit.  Offer patient a sooner appointment if needed.

## 2019-11-18 ENCOUNTER — TELEPHONE (OUTPATIENT)
Dept: FAMILY MEDICINE | Facility: CLINIC | Age: 59
End: 2019-11-18

## 2019-11-18 NOTE — TELEPHONE ENCOUNTER
Called and left a message asking the patient to call the clinic as we were wanting to know how he was feeling as we have seen that he has been seen in the emergency room a few times.

## 2019-11-18 NOTE — TELEPHONE ENCOUNTER
----- Message from Ana Chaves sent at 11/18/2019 12:52 PM CST -----  Contact: self [phone]  Type:  Patient Returning Call    Who Called:Luis oLzano  Who Left Message for Patient:unk  Does the patient know what this is regarding?:appt  Would the patient rather a call back or a response via [facility]? Call back   Best Call Back Number:[phone]  Additional Information:

## 2019-11-20 ENCOUNTER — TELEPHONE (OUTPATIENT)
Dept: FAMILY MEDICINE | Facility: CLINIC | Age: 59
End: 2019-11-20

## 2019-11-20 NOTE — TELEPHONE ENCOUNTER
Attempted to call patient to schedule a sooner appointment as patient has been seen in ER several times per Dr. Nash, left message asking patient to call clinic so we can schedule an appointment for him.

## 2019-11-22 ENCOUNTER — LAB VISIT (OUTPATIENT)
Dept: LAB | Facility: HOSPITAL | Age: 59
End: 2019-11-22
Attending: FAMILY MEDICINE
Payer: COMMERCIAL

## 2019-11-22 DIAGNOSIS — E11.8 TYPE 2 DIABETES MELLITUS WITH COMPLICATION: ICD-10-CM

## 2019-11-22 LAB
ALBUMIN/CREAT UR: 7 UG/MG (ref 0–30)
CREAT UR-MCNC: 243 MG/DL (ref 23–375)
MICROALBUMIN UR DL<=1MG/L-MCNC: 17 UG/ML

## 2019-11-22 PROCEDURE — 82043 UR ALBUMIN QUANTITATIVE: CPT

## 2019-11-23 PROBLEM — N43.40 SPERMATOCELE: Status: ACTIVE | Noted: 2019-09-03

## 2019-11-23 RX ORDER — NEOMYCIN/POLYMYXIN B/PRAMOXINE 3.5-10K-1
CREAM (GRAM) TOPICAL
COMMUNITY
End: 2023-05-05

## 2019-11-27 ENCOUNTER — OFFICE VISIT (OUTPATIENT)
Dept: FAMILY MEDICINE | Facility: CLINIC | Age: 59
End: 2019-11-27
Payer: COMMERCIAL

## 2019-11-27 VITALS
DIASTOLIC BLOOD PRESSURE: 82 MMHG | HEIGHT: 69 IN | WEIGHT: 217 LBS | HEART RATE: 70 BPM | SYSTOLIC BLOOD PRESSURE: 123 MMHG | BODY MASS INDEX: 32.14 KG/M2 | TEMPERATURE: 98 F

## 2019-11-27 DIAGNOSIS — E78.5 DYSLIPIDEMIA: Chronic | ICD-10-CM

## 2019-11-27 DIAGNOSIS — Z79.899 ENCOUNTER FOR LONG-TERM (CURRENT) USE OF MEDICATIONS: Primary | Chronic | ICD-10-CM

## 2019-11-27 DIAGNOSIS — I10 ESSENTIAL HYPERTENSION: Chronic | ICD-10-CM

## 2019-11-27 PROBLEM — E55.9 VITAMIN D DEFICIENCY: Chronic | Status: ACTIVE | Noted: 2019-08-05

## 2019-11-27 PROBLEM — R07.9 CHEST PAIN: Status: RESOLVED | Noted: 2017-02-11 | Resolved: 2019-11-27

## 2019-11-27 PROBLEM — R09.81 SINUS CONGESTION: Status: RESOLVED | Noted: 2018-03-23 | Resolved: 2019-11-27

## 2019-11-27 PROBLEM — M72.2 PLANTAR FASCIITIS: Status: RESOLVED | Noted: 2018-12-14 | Resolved: 2019-11-27

## 2019-11-27 PROBLEM — J32.9 SINUSITIS: Status: RESOLVED | Noted: 2019-08-05 | Resolved: 2019-11-27

## 2019-11-27 PROBLEM — N43.40 SPERMATOCELE: Status: RESOLVED | Noted: 2019-09-03 | Resolved: 2019-11-27

## 2019-11-27 PROCEDURE — 3074F SYST BP LT 130 MM HG: CPT | Mod: CPTII,S$GLB,, | Performed by: FAMILY MEDICINE

## 2019-11-27 PROCEDURE — 3008F PR BODY MASS INDEX (BMI) DOCUMENTED: ICD-10-PCS | Mod: CPTII,S$GLB,, | Performed by: FAMILY MEDICINE

## 2019-11-27 PROCEDURE — 99214 PR OFFICE/OUTPT VISIT, EST, LEVL IV, 30-39 MIN: ICD-10-PCS | Mod: S$GLB,,, | Performed by: FAMILY MEDICINE

## 2019-11-27 PROCEDURE — 3074F PR MOST RECENT SYSTOLIC BLOOD PRESSURE < 130 MM HG: ICD-10-PCS | Mod: CPTII,S$GLB,, | Performed by: FAMILY MEDICINE

## 2019-11-27 PROCEDURE — 3079F PR MOST RECENT DIASTOLIC BLOOD PRESSURE 80-89 MM HG: ICD-10-PCS | Mod: CPTII,S$GLB,, | Performed by: FAMILY MEDICINE

## 2019-11-27 PROCEDURE — 99999 PR PBB SHADOW E&M-EST. PATIENT-LVL III: CPT | Mod: PBBFAC,,, | Performed by: FAMILY MEDICINE

## 2019-11-27 PROCEDURE — 99999 PR PBB SHADOW E&M-EST. PATIENT-LVL III: ICD-10-PCS | Mod: PBBFAC,,, | Performed by: FAMILY MEDICINE

## 2019-11-27 PROCEDURE — 3079F DIAST BP 80-89 MM HG: CPT | Mod: CPTII,S$GLB,, | Performed by: FAMILY MEDICINE

## 2019-11-27 PROCEDURE — 99214 OFFICE O/P EST MOD 30 MIN: CPT | Mod: S$GLB,,, | Performed by: FAMILY MEDICINE

## 2019-11-27 PROCEDURE — 3008F BODY MASS INDEX DOCD: CPT | Mod: CPTII,S$GLB,, | Performed by: FAMILY MEDICINE

## 2019-11-27 RX ORDER — GLIMEPIRIDE 4 MG/1
8 TABLET ORAL
Qty: 180 TABLET | Refills: 3 | Status: SHIPPED | OUTPATIENT
Start: 2019-11-27 | End: 2020-09-09 | Stop reason: SDUPTHER

## 2019-11-27 RX ORDER — BENZONATATE 100 MG/1
100 CAPSULE ORAL 3 TIMES DAILY PRN
COMMUNITY
End: 2019-11-27 | Stop reason: ALTCHOICE

## 2019-11-27 RX ORDER — LISINOPRIL 5 MG/1
5 TABLET ORAL DAILY
Qty: 90 TABLET | Refills: 3 | Status: SHIPPED | OUTPATIENT
Start: 2019-11-27 | End: 2020-09-09 | Stop reason: SDUPTHER

## 2019-11-27 RX ORDER — METFORMIN HYDROCHLORIDE 500 MG/1
1000 TABLET, EXTENDED RELEASE ORAL 2 TIMES DAILY WITH MEALS
Qty: 360 TABLET | Refills: 1 | Status: SHIPPED | OUTPATIENT
Start: 2019-11-27 | End: 2020-08-24

## 2019-11-27 NOTE — ASSESSMENT & PLAN NOTE
A1c is elevated from previous.  Start ACE-inhibitor.  No issues with feet currently.  Increase metformin max dose.  Monitor hemoglobin A1c.  Discussed diabetic diet and exercise protocol.  Continue medications.  Monitor for side effects.  Discussed checking blood glucose.  Discussed symptoms to monitor for and to notify me immediately if persistent or worsening.  Follow up with Ophthalmology/Optometry and Podiatry.

## 2019-11-27 NOTE — PATIENT INSTRUCTIONS
Follow up in about 3 months (around 2/27/2020), or if symptoms worsen or fail to improve, for DM, HTN, HLD, LAB RESULTS.     If no improvement in symptoms or symptoms worsen, please be advised to call MD, follow-up at clinic and/or go to ER if becomes severe.    Charles Nash M.D.         We Offer TELEHEATLH & Same Day Appointments!   Book your Telehealth appointment with me through my nurse or   Clinic appointments on [employer]!    [address] [place] [address]    Office: [phone]     FAX: [phone]    Check out my Facebook Page and Follow Me at: CLICK HERE    Check out my website at [employer] by clicking on: CLICK HERE    To Schedule appointments online, go to [employer]: CLICK HERE     Location: [email or web address]

## 2019-11-27 NOTE — ASSESSMENT & PLAN NOTE
Continue and refill hydrochlorothiazide 12.5 mg, stop amlodipine 2.5 mg.  Start lisinopril for renal protection.  Discussed hypertension disease course, DASH-diet and exercise.  Discussed medication regimen importance of treating high blood pressure.  Patient advised of risk of untreated blood pressure.    ER precautions were given for symptoms of hypertensive urgency and emergency.

## 2019-11-27 NOTE — ASSESSMENT & PLAN NOTE
CBC CMP thyroid labs are stable at this time.  Medication refills as prescribed.Complete history and physical was completed today.  Complete and thorough medication reconciliation was performed.  Discussed risks and benefits of medications.  Advised patient on orders and health maintenance.  We discussed old records and old labs if available.  Will request any records not available through epic.  Continue current medications listed on your summary sheet.

## 2019-11-27 NOTE — ASSESSMENT & PLAN NOTE
Lipid panel is improved since increasing atorvastatin 40 mg.  Continue atorvastatin 40 mg.  Goal of LDL is less than 70.  Recheck lipid in six months.  Discussed hyperlipidemia disease course, healthy diet and increased need for exercise.  Discussed the risk of cardiovascular disease, increase stroke and heart attack risk.    Patient voiced understanding and understood the treatment plan. All questions were answered.

## 2019-11-27 NOTE — PROGRESS NOTES
PLAN:      Problem List Items Addressed This Visit     Hypertensive disorder (Chronic)     Continue and refill hydrochlorothiazide 12.5 mg, stop amlodipine 2.5 mg.  Start lisinopril for renal protection.  Discussed hypertension disease course, DASH-diet and exercise.  Discussed medication regimen importance of treating high blood pressure.  Patient advised of risk of untreated blood pressure.    ER precautions were given for symptoms of hypertensive urgency and emergency.           Relevant Orders    Hypertension Digital Medicine (HDMP) Enrollment Order (Completed)    Dyslipidemia (Chronic)     Lipid panel is improved since increasing atorvastatin 40 mg.  Continue atorvastatin 40 mg.  Goal of LDL is less than 70.  Recheck lipid in six months.  Discussed hyperlipidemia disease course, healthy diet and increased need for exercise.  Discussed the risk of cardiovascular disease, increase stroke and heart attack risk.    Patient voiced understanding and understood the treatment plan. All questions were answered.              Uncontrolled type 2 diabetes mellitus without complication, without long-term current use of insulin (Chronic)     A1c is elevated from previous.  Start ACE-inhibitor.  No issues with feet currently.  Increase metformin max dose.  Monitor hemoglobin A1c.  Discussed diabetic diet and exercise protocol.  Continue medications.  Monitor for side effects.  Discussed checking blood glucose.  Discussed symptoms to monitor for and to notify me immediately if persistent or worsening.  Follow up with Ophthalmology/Optometry and Podiatry.           Relevant Medications    glimepiride (AMARYL) 4 MG tablet    metFORMIN (GLUCOPHAGE-XR) 500 MG 24 hr tablet    lisinopril (PRINIVIL,ZESTRIL) 5 MG tablet    Other Relevant Orders    Diabetes Digital Medicine (DDMP) Enrollment Order (Completed)    Encounter for long-term (current) use of medications - Primary (Chronic)     CBC CMP thyroid labs are stable at this time.   Medication refills as prescribed.Complete history and physical was completed today.  Complete and thorough medication reconciliation was performed.  Discussed risks and benefits of medications.  Advised patient on orders and health maintenance.  We discussed old records and old labs if available.  Will request any records not available through epic.  Continue current medications listed on your summary sheet.               Future Appointments     Date Provider Specialty Appt Notes    2/20/2020  Lab     2/27/2020 Charles Nash MD Family Medicine 3 month f/u review labs           Medication List with Changes/Refills   New Medications    LISINOPRIL (PRINIVIL,ZESTRIL) 5 MG TABLET    Take 1 tablet (5 mg total) by mouth once daily.   Current Medications    ASPIRIN (ECOTRIN) 81 MG EC TABLET    Take 1 tablet (81 mg total) by mouth once daily.    ATORVASTATIN (LIPITOR) 40 MG TABLET    Take 1 tablet (40 mg total) by mouth once daily.    FLUTICASONE PROPIONATE (FLONASE) 50 MCG/ACTUATION NASAL SPRAY    1 spray (50 mcg total) by Each Nostril route 2 (two) times daily as needed.    GABAPENTIN (NEURONTIN) 300 MG CAPSULE    Take 1 capsule (300 mg total) by mouth 3 (three) times daily.    HYDROCHLOROTHIAZIDE (HYDRODIURIL) 12.5 MG TAB    Take 1 tablet (12.5 mg total) by mouth once daily.    MECLIZINE (ANTIVERT) 25 MG TABLET    Take 1 tablet (25 mg total) by mouth 3 (three) times daily as needed.    MV-MIN-FOLIC ACID-LUTEIN (ADULT MULTIVITAMIN, W-LUTEIN,) 200-137.5 MCG CHEW    Adult Multivitamin (w-lutein)   1 tablet by mouth daily   Changed and/or Refilled Medications    Modified Medication Previous Medication    GLIMEPIRIDE (AMARYL) 4 MG TABLET glimepiride (AMARYL) 4 MG tablet       Take 2 tablets (8 mg total) by mouth daily with breakfast.    Take 2 tablets (8 mg total) by mouth before breakfast.    METFORMIN (GLUCOPHAGE-XR) 500 MG 24 HR TABLET metFORMIN (GLUCOPHAGE-XR) 500 MG 24 hr tablet       Take 2 tablets (1,000 mg total)  by mouth 2 (two) times daily with meals.    Take 1 po qhs x 2 weeks, then take 1 bid for 2 weeks, then 2 qhs and 1 po qam x 2 weeks, then 2 bid   Discontinued Medications    AMLODIPINE (NORVASC) 2.5 MG TABLET    Take 1 tablet (2.5 mg total) by mouth once daily.    BENZONATATE (TESSALON) 100 MG CAPSULE    Take 100 mg by mouth 3 (three) times daily as needed for Cough.    LINAGLIPTIN (TRADJENTA) 5 MG TAB TABLET    Take 1 tablet (5 mg total) by mouth once daily.    MULTIVITAMIN (ONE DAILY MULTIVITAMIN) PER TABLET    Take 1 tablet by mouth once daily.       Charles Nash M.D.     ==========================================================================  Subjective:      Patient ID: Luis Lozano is a 59 y.o. male.  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     Chief Complaint: Hypertension; Results; Hyperlipidemia; and Diabetes      Problem List Items Addressed This Visit     Hypertensive disorder (Chronic)    Overview     Chronic.  Stable.  Patient taking hydrochlorothiazide 12.5 mg amlodipine 2.5 mg.  Reports compliance.  No side effects reported.  No symptoms of chest pain shortness of breath blurry vision etc.  =======================================================  November 2019:  Patient reports that he did have episode of vertigo and went to the ER but was given meclizine and Tessalon Perles for the cough.  Has not been having issues since then.  CHRONIC. STABLE. BP Reviewed.  Compliant with BP medications. No SE reported.   (-) CP, SOB, palpitations, dizziness, lightheadedness, HA, arm numbness, tingling or weakness, syncope.  Creatinine   Date Value Ref Range Status   11/13/2019 0.86 0.50 - 1.40 mg/dL Final   ======================================================         Current Assessment & Plan     Continue and refill hydrochlorothiazide 12.5 mg, stop amlodipine 2.5 mg.  Start lisinopril for renal protection.  Discussed hypertension disease  course, DASH-diet and exercise.  Discussed medication regimen importance of treating high blood pressure.  Patient advised of risk of untreated blood pressure.    ER precautions were given for symptoms of hypertensive urgency and emergency.           Dyslipidemia (Chronic)    Overview     Chronic.  Control is uncertain.  Due for labs.  On atorvastatin 20 mg.  Reports compliance.  No side effects reported.  =======================================================  November 2019:  Patient has been on increased dose of atorvastatin 40 mg and is not having any side effects currently.  CHRONIC. STABLE. Lab analysis reviewed.   (-) CP, SOB, abdominal pain, N/V/D, constipation, jaundice, skin changes.  (-) Myalgias  Lab Results   Component Value Date    CHOL 160 11/22/2019    CHOL 185 08/05/2019    CHOL 225 (H) 06/03/2017     Lab Results   Component Value Date    HDL 53 11/22/2019    HDL 57 08/05/2019    HDL 52 03/08/2018     Lab Results   Component Value Date    LDLCALC 89.0 11/22/2019    LDLCALC 111.4 08/05/2019    LDLCALC 93 03/08/2018     Lab Results   Component Value Date    TRIG 90 11/22/2019    TRIG 83 08/05/2019    TRIG 72 03/08/2018     Lab Results   Component Value Date    CHOLHDL 33.1 11/22/2019    CHOLHDL 30.8 08/05/2019    CHOLHDL 24.9 06/03/2017     Lab Results   Component Value Date    TOTALCHOLEST 3.0 11/22/2019    TOTALCHOLEST 3.2 08/05/2019    TOTALCHOLEST 4.0 06/03/2017     Lab Results   Component Value Date    ALT 33 11/13/2019    AST 30 11/13/2019    ALKPHOS 44 11/13/2019    BILITOT 0.7 11/13/2019     ======================================================    ======================================================         Current Assessment & Plan     Lipid panel is improved since increasing atorvastatin 40 mg.  Continue atorvastatin 40 mg.  Goal of LDL is less than 70.  Recheck lipid in six months.  Discussed hyperlipidemia disease course, healthy diet and increased need for exercise.  Discussed the risk  of cardiovascular disease, increase stroke and heart attack risk.    Patient voiced understanding and understood the treatment plan. All questions were answered.              Uncontrolled type 2 diabetes mellitus without complication, without long-term current use of insulin (Chronic)    Overview     =======================================================  November 2019:    Diabetes Management Status    Statin: Taking  ACE/ARB: Not taking    Screening or Prevention Patient's value Goal Complete/Controlled?   HgA1C Testing and Control   Lab Results   Component Value Date    HGBA1C 7.9 (H) 11/22/2019    HGBA1C 7.9 (H) 11/22/2019      Annually/Less than 8% Yes   Lipid profile : 11/22/2019 Annually Yes   LDL control Lab Results   Component Value Date    LDLCALC 89.0 11/22/2019    Annually/Less than 100 mg/dl  Yes   Nephropathy screening Lab Results   Component Value Date    LABMICR 17.0 11/22/2019     Lab Results   Component Value Date    PROTEINUA Negative 11/13/2019    Annually Yes   Blood pressure BP Readings from Last 1 Encounters:   11/13/19 (!) 161/90    Less than 140/90 No   Dilated retinal exam : 08/02/2018 Annually No   Foot exam   : 11/07/2018 Annually No       ======================================================         Current Assessment & Plan     A1c is elevated from previous.  Start ACE-inhibitor.  No issues with feet currently.  Increase metformin max dose.  Monitor hemoglobin A1c.  Discussed diabetic diet and exercise protocol.  Continue medications.  Monitor for side effects.  Discussed checking blood glucose.  Discussed symptoms to monitor for and to notify me immediately if persistent or worsening.  Follow up with Ophthalmology/Optometry and Podiatry.           Encounter for long-term (current) use of medications - Primary (Chronic)    Overview     08/06/2019 : Patient is on CHRONIC long-term drug therapy for managed conditions. See medication list. Reports compliance.  No side effects reported.   Routine lab work is being monitored.  Patient does  need refills today.   =======================================================  November 2019:    CHRONIC. Stable. Compliant with medications for managed conditions. See medication list. No SE reported.   Routine lab analysis is being monitored. Refills were addressed.  Lab Results   Component Value Date    WBC 8.44 11/13/2019    HGB 15.3 11/13/2019    HCT 45.8 11/13/2019    MCV 88 11/13/2019     11/13/2019       Chemistry        Component Value Date/Time     11/13/2019 2003    K 3.9 11/13/2019 2003     11/13/2019 2003    CO2 30 11/13/2019 2003    BUN 21 11/13/2019 2003    CREATININE 0.86 11/13/2019 2003     (H) 11/13/2019 2003        Component Value Date/Time    CALCIUM 9.6 11/13/2019 2003    ALKPHOS 44 11/13/2019 2003    AST 30 11/13/2019 2003    ALT 33 11/13/2019 2003    BILITOT 0.7 11/13/2019 2003    ESTGFRAFRICA >60 11/13/2019 2003    EGFRNONAA >60 11/13/2019 2003        Lab Results   Component Value Date    TSH 0.796 08/05/2019    J2BLBRE 7.2 08/05/2019    T3FREE 2.6 08/05/2019     ======================================================         Current Assessment & Plan     CBC CMP thyroid labs are stable at this time.  Medication refills as prescribed.Complete history and physical was completed today.  Complete and thorough medication reconciliation was performed.  Discussed risks and benefits of medications.  Advised patient on orders and health maintenance.  We discussed old records and old labs if available.  Will request any records not available through epic.  Continue current medications listed on your summary sheet.                  Past Medical History:  Past Medical History:   Diagnosis Date    Diabetes mellitus, type 2     Hypertension     Spermatocele 9/3/2019     History reviewed. No pertinent surgical history.  Review of patient's allergies indicates:  No Known Allergies  Medication List with Changes/Refills   New  Medications    LISINOPRIL (PRINIVIL,ZESTRIL) 5 MG TABLET    Take 1 tablet (5 mg total) by mouth once daily.   Current Medications    ASPIRIN (ECOTRIN) 81 MG EC TABLET    Take 1 tablet (81 mg total) by mouth once daily.    ATORVASTATIN (LIPITOR) 40 MG TABLET    Take 1 tablet (40 mg total) by mouth once daily.    FLUTICASONE PROPIONATE (FLONASE) 50 MCG/ACTUATION NASAL SPRAY    1 spray (50 mcg total) by Each Nostril route 2 (two) times daily as needed.    GABAPENTIN (NEURONTIN) 300 MG CAPSULE    Take 1 capsule (300 mg total) by mouth 3 (three) times daily.    HYDROCHLOROTHIAZIDE (HYDRODIURIL) 12.5 MG TAB    Take 1 tablet (12.5 mg total) by mouth once daily.    MECLIZINE (ANTIVERT) 25 MG TABLET    Take 1 tablet (25 mg total) by mouth 3 (three) times daily as needed.    MV-MIN-FOLIC ACID-LUTEIN (ADULT MULTIVITAMIN, W-LUTEIN,) 200-137.5 MCG CHEW    Adult Multivitamin (w-lutein)   1 tablet by mouth daily   Changed and/or Refilled Medications    Modified Medication Previous Medication    GLIMEPIRIDE (AMARYL) 4 MG TABLET glimepiride (AMARYL) 4 MG tablet       Take 2 tablets (8 mg total) by mouth daily with breakfast.    Take 2 tablets (8 mg total) by mouth before breakfast.    METFORMIN (GLUCOPHAGE-XR) 500 MG 24 HR TABLET metFORMIN (GLUCOPHAGE-XR) 500 MG 24 hr tablet       Take 2 tablets (1,000 mg total) by mouth 2 (two) times daily with meals.    Take 1 po qhs x 2 weeks, then take 1 bid for 2 weeks, then 2 qhs and 1 po qam x 2 weeks, then 2 bid   Discontinued Medications    AMLODIPINE (NORVASC) 2.5 MG TABLET    Take 1 tablet (2.5 mg total) by mouth once daily.    BENZONATATE (TESSALON) 100 MG CAPSULE    Take 100 mg by mouth 3 (three) times daily as needed for Cough.    LINAGLIPTIN (TRADJENTA) 5 MG TAB TABLET    Take 1 tablet (5 mg total) by mouth once daily.    MULTIVITAMIN (ONE DAILY MULTIVITAMIN) PER TABLET    Take 1 tablet by mouth once daily.      Social History     Tobacco Use    Smoking status: Never Smoker  "  Substance Use Topics    Alcohol use: No     Frequency: Never     Drinks per session: 1 or 2     Binge frequency: Never      Family History   Problem Relation Age of Onset    Diabetes Mother     Heart disease Mother 63    Lung cancer Father        I have reviewed the complete PMH, social history, surgical history, allergies and medications.  As well as family history.    Review of Systems   Constitutional: Negative for chills, fatigue, fever and unexpected weight change.   HENT: Negative for ear pain and sore throat.    Eyes: Negative for redness and visual disturbance.   Respiratory: Negative for cough and shortness of breath.    Cardiovascular: Negative for chest pain and palpitations.   Gastrointestinal: Negative for nausea and vomiting.   Endocrine: Negative for cold intolerance and heat intolerance.   Genitourinary: Negative for difficulty urinating and hematuria.   Musculoskeletal: Negative for arthralgias and myalgias.   Skin: Negative for rash and wound.   Allergic/Immunologic: Negative for environmental allergies and food allergies.   Neurological: Negative for weakness and headaches.   Hematological: Negative for adenopathy. Does not bruise/bleed easily.   Psychiatric/Behavioral: Negative for sleep disturbance. The patient is not nervous/anxious.      Objective:   /82   Pulse 70   Temp 98.3 °F (36.8 °C) (Oral)   Ht 5' 9" (1.753 m)   Wt 98.4 kg (217 lb)   BMI 32.05 kg/m²   Physical Exam   Constitutional: He is oriented to person, place, and time. He appears well-developed and well-nourished. No distress.   HENT:   Head: Normocephalic and atraumatic.   Eyes: Pupils are equal, round, and reactive to light. EOM are normal.   Neck: Normal range of motion. Neck supple.   Cardiovascular: Normal rate, regular rhythm, normal heart sounds and intact distal pulses.   No murmur heard.  Pulses:       Dorsalis pedis pulses are 2+ on the right side, and 2+ on the left side.        Posterior tibial pulses " are 2+ on the right side, and 2+ on the left side.   Pulmonary/Chest: Effort normal and breath sounds normal. No respiratory distress. He has no wheezes.   Musculoskeletal: Normal range of motion. He exhibits no edema.        Right foot: There is normal range of motion and no deformity.        Left foot: There is normal range of motion and no deformity.   Feet:   Right Foot:   Protective Sensation: 10 sites tested. 10 sites sensed.   Skin Integrity: Positive for callus and dry skin. Negative for ulcer, blister, skin breakdown, erythema or warmth.   Left Foot:   Protective Sensation: 10 sites tested. 10 sites sensed.   Skin Integrity: Positive for callus and dry skin. Negative for ulcer, blister, skin breakdown, erythema or warmth.   Neurological: He is alert and oriented to person, place, and time. No cranial nerve deficit.   Skin: Skin is warm and dry. Capillary refill takes less than 2 seconds.   Psychiatric: He has a normal mood and affect. His behavior is normal.   Nursing note and vitals reviewed.      Assessment:     1. Encounter for long-term (current) use of medications    2. Uncontrolled type 2 diabetes mellitus without complication, without long-term current use of insulin    3. Essential hypertension    4. Dyslipidemia      MDM:     I have Reviewed and summarized old records.  I have performed thorough medication reconciliation today and discussed risk and benefits of each medication.  I have reviewed labs and discussed with patient.  All questions were answered.  I am requesting old records and will review them once they are available.    I have signed for the following orders AND/OR meds.  Orders Placed This Encounter   Procedures    Diabetes Digital Medicine (DDMP) Enrollment Order         Hypertension Digital Medicine (HDMP) Enrollment Order          Order Specific Question:   BP Control Goal     Answer:   Current (2017) AHA Guidelines     Medications Ordered This Encounter   Medications     glimepiride (AMARYL) 4 MG tablet     Sig: Take 2 tablets (8 mg total) by mouth daily with breakfast.     Dispense:  180 tablet     Refill:  3    lisinopril (PRINIVIL,ZESTRIL) 5 MG tablet     Sig: Take 1 tablet (5 mg total) by mouth once daily.     Dispense:  90 tablet     Refill:  3    metFORMIN (GLUCOPHAGE-XR) 500 MG 24 hr tablet     Sig: Take 2 tablets (1,000 mg total) by mouth 2 (two) times daily with meals.     Dispense:  360 tablet     Refill:  1        Follow up in about 3 months (around 2/27/2020), or if symptoms worsen or fail to improve, for DM, HTN, HLD, LAB RESULTS.    If no improvement in symptoms or symptoms worsen, advised to call/follow-up at clinic or go to ER. Patient voiced understanding and all questions/concerns were addressed.     DISCLAIMER: This note was compiled by using a speech recognition dictation system and therefore please be aware that typographical / speech recognition errors can and do occur.  Please contact me if you see any errors specifically.    Charles Nash M.D.       Office: [phone]   [address] [place] [address]  FAX: [phone]

## 2020-01-07 RX ORDER — MECLIZINE HCL 12.5 MG 12.5 MG/1
25 TABLET ORAL 3 TIMES DAILY PRN
Qty: 30 TABLET | Refills: 5 | Status: SHIPPED | OUTPATIENT
Start: 2020-01-07 | End: 2021-02-26

## 2020-01-07 NOTE — TELEPHONE ENCOUNTER
----- Message from Keyona Moss sent at 1/7/2020  1:30 PM CST -----  Contact: pt  Pt would like a refill of meclizine (ANTIVERT) 12.5 mg tablet  And can be reached at [phone]  [facility] in [place]      ThanksKeyona

## 2020-03-23 ENCOUNTER — PATIENT MESSAGE (OUTPATIENT)
Dept: FAMILY MEDICINE | Facility: CLINIC | Age: 60
End: 2020-03-23

## 2020-03-23 NOTE — TELEPHONE ENCOUNTER
See patient's message and advise.      To: [facility] STAFF      From: Luis Lozano      Created: 3/23/2020 4:48 PM        *-*-*This message has not been handled.*-*-*    Arlene Nash,  As you may already know [facility] visited [facility] weeks [facility]. I was diagnosed with the Flu. Will it be possible for you to call in some Amoxicillin 500Mg capsules to help clear a sinsues infection?   If yes. [facility] place [facility]  Luis

## 2020-03-24 ENCOUNTER — PATIENT MESSAGE (OUTPATIENT)
Dept: FAMILY MEDICINE | Facility: CLINIC | Age: 60
End: 2020-03-24

## 2020-03-24 NOTE — TELEPHONE ENCOUNTER
Patient had difficulty downloading guero from guero store, patient downloaded the incorrect guero.  I talked the patient through to finding the correct guero so that he could download the correct one, patient appointment was rescheduled until tomorrow morning as he states that he will answer all the questions and preform the epre check sections so that all he has to do is get on the appointment time in the morning.  Dr. Nash notified of this.

## 2020-03-25 ENCOUNTER — TELEPHONE (OUTPATIENT)
Dept: FAMILY MEDICINE | Facility: CLINIC | Age: 60
End: 2020-03-25

## 2020-03-25 ENCOUNTER — OFFICE VISIT (OUTPATIENT)
Dept: FAMILY MEDICINE | Facility: CLINIC | Age: 60
End: 2020-03-25
Payer: COMMERCIAL

## 2020-03-25 DIAGNOSIS — J01.40 SUBACUTE PANSINUSITIS: ICD-10-CM

## 2020-03-25 DIAGNOSIS — E11.65 TYPE 2 DIABETES MELLITUS WITH HYPERGLYCEMIA, WITHOUT LONG-TERM CURRENT USE OF INSULIN: ICD-10-CM

## 2020-03-25 DIAGNOSIS — R05.9 COUGH: ICD-10-CM

## 2020-03-25 DIAGNOSIS — R59.1 LYMPHADENOPATHY: Primary | ICD-10-CM

## 2020-03-25 PROBLEM — J34.9 SINUS PROBLEM: Status: ACTIVE | Noted: 2019-08-05

## 2020-03-25 PROCEDURE — 99213 PR OFFICE/OUTPT VISIT, EST, LEVL III, 20-29 MIN: ICD-10-PCS | Mod: 95,,, | Performed by: FAMILY MEDICINE

## 2020-03-25 PROCEDURE — 99213 OFFICE O/P EST LOW 20 MIN: CPT | Mod: 95,,, | Performed by: FAMILY MEDICINE

## 2020-03-25 PROCEDURE — 3051F PR MOST RECENT HEMOGLOBIN A1C LEVEL 7.0 - < 8.0%: ICD-10-PCS | Mod: CPTII,95,, | Performed by: FAMILY MEDICINE

## 2020-03-25 PROCEDURE — 3051F HG A1C>EQUAL 7.0%<8.0%: CPT | Mod: CPTII,95,, | Performed by: FAMILY MEDICINE

## 2020-03-25 RX ORDER — PROMETHAZINE HYDROCHLORIDE AND CODEINE PHOSPHATE 6.25; 1 MG/5ML; MG/5ML
5 SOLUTION ORAL EVERY 4 HOURS PRN
Qty: 240 ML | Refills: 0 | Status: SHIPPED | OUTPATIENT
Start: 2020-03-25 | End: 2020-04-04

## 2020-03-25 RX ORDER — AMOXICILLIN 500 MG/1
500 CAPSULE ORAL EVERY 8 HOURS
Qty: 30 CAPSULE | Refills: 0 | Status: SHIPPED | OUTPATIENT
Start: 2020-03-25 | End: 2020-04-04

## 2020-03-25 NOTE — ASSESSMENT & PLAN NOTE
Check A1c follow-up visit for diabetes scheduled.  Avoid steroids.  Monitor hemoglobin A1c.  Discussed diabetic diet and exercise protocol.  Continue medications.  Monitor for side effects.  Discussed checking blood glucose.  Discussed symptoms to monitor for and to notify me immediately if persistent or worsening.  Follow up with Ophthalmology/Optometry and Podiatry.

## 2020-03-25 NOTE — PATIENT INSTRUCTIONS
Follow up in about 3 months (around 6/25/2020), or if symptoms worsen or fail to improve, for DM, Med refills.     If no improvement in symptoms or symptoms worsen, please be advised to call MD, follow-up at clinic and/or go to ER if becomes severe.    Charles Nash M.D.        We Offer TELEHEALTH & Same Day Appointments!   Book your Telehealth appointment with me through my nurse or   Clinic appointments on [employer]!    [address] [place] [address]    Office: [phone]   FAX: [phone]    Check out my Tag'By Page and Follow Me at: [email or web address]    Check out my website at [employer] by clicking on: [email or web address]    To Schedule appointments online, go to [employer]: [email or web address]     OVER THE COUNTER RECOMMENDATIONS/SUGGESTIONS.    Make sure to stay well hydrated.    Use Nasal Saline to mechanically move any post nasal drip from your eustachian tube or from the back of your throat.    Use warm salt water gargles to ease your throat pain. Warm salt water gargles as needed for sore throat-  1/2 tsp salt to 1 cup warm water, gargle as desired.    Use an antihistamine such as Claritin, Zyrtec or Allegra to dry you out.     Use pseudoephedrine (behind the counter) to decongest. Pseudoephedrine  30 mg up to 240 mg /day. It can raise your blood pressure and give you palpitations.    Use mucinex (guaifenisin) to break up mucous up to 2400mg/day to loosen any mucous.   The mucinex DM pill has a cough suppressant that can be sedating. It can be used at night to stop the tickle at the back of your throat.  You can use Mucinex D (it has guaifenesin and a high dose of pseudoephedrine) in the mornings to help decongest.      Use Afrin (oxymetazoline) in each nare for no longer than 3 days, as it is addictive. It can also dry out your mucous membranes and cause elevated blood pressure. This is  especially useful if you are flying.    Use Flonase 1-2 sprays/nostril per day. It is a local acting steroid nasal spray, if you develop a bloody nose, stop using the medication immediately.    Sometimes Nyquil at night is beneficial to help you get some rest, however it is sedating and it does have an antihistamine, and tylenol.    Honey is a natural cough suppressant that can be used.    Tylenol up to 4,000 mg a day is safe for short periods and can be used for body aches, pain, and fever. However, in high doses and prolonged use it can cause liver irritation.    Ibuprofen is a non-steroidal anti-inflammatory that can be used for body aches, pain, and fever. However, it can also cause stomach irritation if over used.

## 2020-03-25 NOTE — ASSESSMENT & PLAN NOTE
Patient with subacute bacterial sinusitis with lymphadenopathy per self-reported exam.  Start amoxicillin.  Will avoid steroid at this time due to diabetes.  Patient advised to update diabetes labs and make follow-up appointment for that condition.    Reviewed labs with normal kidney and liver function.    Discussed condition course and signs and symptoms to expect.  Patient advised take anti-inflammatories and or Tylenol for pain or fever.  ER precautions.  Call MD or follow-up to clinic if not improving or worsening symptoms.

## 2020-03-25 NOTE — TELEPHONE ENCOUNTER
----- Message from Yakov Monroy sent at 3/25/2020  8:16 AM CDT -----  Contact: [facility]  Type:  Pharmacy Calling to Clarify an RX    Name of Caller:[place]  [facility]  Prescription Name:promenthazine   What do they need to clarify?:n/a  Best Call Back Number:[phone]  Additional Information: requesting getting a prescription change    ThanksYakov

## 2020-03-25 NOTE — PROGRESS NOTES
Primary Care Telemedicine Note    The patient location is:  Patient Home- [place]  The chief complaint leading to consultation is:  Sinus problems  Total time spent with patient: 11 min    Visit type: Virtual visit with synchronous audio only and video  Each patient to whom he or she provides medical services by telemedicine is:  (1) informed of the relationship between the physician and patient and the respective role of any other health care provider with respect to management of the patient; and (2) notified that he or she may decline to receive medical services by telemedicine and may withdraw from such care at any time.    ===================================================================================  PLAN:      Problem List Items Addressed This Visit     Type 2 diabetes mellitus with hyperglycemia, without long-term current use of insulin (Chronic)     Check A1c follow-up visit for diabetes scheduled.  Avoid steroids.  Monitor hemoglobin A1c.  Discussed diabetic diet and exercise protocol.  Continue medications.  Monitor for side effects.  Discussed checking blood glucose.  Discussed symptoms to monitor for and to notify me immediately if persistent or worsening.  Follow up with Ophthalmology/Optometry and Podiatry.           Subacute pansinusitis     Patient with subacute bacterial sinusitis with lymphadenopathy per self-reported exam.  Start amoxicillin.  Will avoid steroid at this time due to diabetes.  Patient advised to update diabetes labs and make follow-up appointment for that condition.    Reviewed labs with normal kidney and liver function.    Discussed condition course and signs and symptoms to expect.  Patient advised take anti-inflammatories and or Tylenol for pain or fever.  ER precautions.  Call MD or follow-up to clinic if not improving or worsening symptoms.           Relevant Medications    promethazine-codeine 6.25-10 mg/5 ml (PHENERGAN WITH CODEINE) 6.25-10 mg/5 mL syrup      Other  Visit Diagnoses     Lymphadenopathy    -  Primary    Relevant Medications    amoxicillin (AMOXIL) 500 MG capsule    Cough        Relevant Medications    promethazine-codeine 6.25-10 mg/5 ml (PHENERGAN WITH CODEINE) 6.25-10 mg/5 mL syrup             Medication List with Changes/Refills   New Medications    AMOXICILLIN (AMOXIL) 500 MG CAPSULE    Take 1 capsule (500 mg total) by mouth every 8 (eight) hours. for 10 days    PROMETHAZINE-CODEINE 6.25-10 MG/5 ML (PHENERGAN WITH CODEINE) 6.25-10 MG/5 ML SYRUP    Take 5 mLs by mouth every 4 (four) hours as needed for Cough.   Current Medications    ASPIRIN (ECOTRIN) 81 MG EC TABLET    Take 1 tablet (81 mg total) by mouth once daily.    ATORVASTATIN (LIPITOR) 40 MG TABLET    Take 1 tablet (40 mg total) by mouth once daily.    BENZONATATE (TESSALON) 200 MG CAPSULE        FLUTICASONE PROPIONATE (FLONASE) 50 MCG/ACTUATION NASAL SPRAY    1 spray (50 mcg total) by Each Nostril route 2 (two) times daily as needed.    GABAPENTIN (NEURONTIN) 300 MG CAPSULE    Take 1 capsule (300 mg total) by mouth 3 (three) times daily.    GLIMEPIRIDE (AMARYL) 4 MG TABLET    Take 2 tablets (8 mg total) by mouth daily with breakfast.    HYDROCHLOROTHIAZIDE (HYDRODIURIL) 12.5 MG TAB    Take 1 tablet (12.5 mg total) by mouth once daily.    LISINOPRIL (PRINIVIL,ZESTRIL) 5 MG TABLET    Take 1 tablet (5 mg total) by mouth once daily.    MECLIZINE (ANTIVERT) 12.5 MG TABLET    Take 2 tablets (25 mg total) by mouth 3 (three) times daily as needed for Dizziness.    METFORMIN (GLUCOPHAGE-XR) 500 MG 24 HR TABLET    Take 2 tablets (1,000 mg total) by mouth 2 (two) times daily with meals.    MV-MIN-FOLIC ACID-LUTEIN (ADULT MULTIVITAMIN, W-LUTEIN,) 200-137.5 MCG CHEW    Adult Multivitamin (w-lutein)   1 tablet by mouth daily    PANTOPRAZOLE (PROTONIX) 20 MG TABLET    Take 1 tablet (20 mg total) by mouth once daily.    PEPCID AC 20 MG TABLET    TK 1 T PO BID FOR 7 DAYS    SUCRALFATE (CARAFATE) 1 GRAM TABLET    Take  1 tablet (1 g total) by mouth 4 (four) times daily before meals and nightly.    TADALAFIL (CIALIS) 20 MG TAB    DISSOLVE 1 TO 2 TABLETS BY MOUTH EVERY DAY AS NEEDED FOR sex   Discontinued Medications    OSELTAMIVIR (TAMIFLU) 75 MG CAPSULE           Charles Nash M.D.     ==========================================================================  Subjective:      Patient ID: Luis Lozano is a 59 y.o. male.  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     Chief Complaint: Sinus Problem      Problem List Items Addressed This Visit     Type 2 diabetes mellitus with hyperglycemia, without long-term current use of insulin (Chronic)    Overview     =======================================================  November 2019:  ReviewedDiabetes Management Status  Statin: TakingACE/ARB: Not taking  =======================================================  MARCH 2020:    Diabetes Management Status    Statin: Taking  ACE/ARB: Taking    Screening or Prevention Patient's value Goal Complete/Controlled?   HgA1C Testing and Control   Lab Results   Component Value Date    HGBA1C 7.9 (H) 11/22/2019    HGBA1C 7.9 (H) 11/22/2019      Annually/Less than 8% Yes   Lipid profile : 11/22/2019 Annually Yes   LDL control Lab Results   Component Value Date    LDLCALC 89.0 11/22/2019    Annually/Less than 100 mg/dl  Yes   Nephropathy screening Lab Results   Component Value Date    LABMICR 17.0 11/22/2019     Lab Results   Component Value Date    PROTEINUA Negative 11/13/2019    Annually Yes   Blood pressure BP Readings from Last 1 Encounters:   01/14/20 129/81    Less than 140/90 Yes   Dilated retinal exam : 08/02/2018 Annually No   Foot exam   : 11/27/2019 Annually Yes       ======================================================         Current Assessment & Plan     Check A1c follow-up visit for diabetes scheduled.  Avoid steroids.  Monitor hemoglobin A1c.  Discussed diabetic diet and  exercise protocol.  Continue medications.  Monitor for side effects.  Discussed checking blood glucose.  Discussed symptoms to monitor for and to notify me immediately if persistent or worsening.  Follow up with Ophthalmology/Optometry and Podiatry.           Subacute pansinusitis    Overview     March 2020:  New problem.  Subacute. Patient reports that he was diagnosed with the flu about three weeks ago. Left ear pain, headache, coughing up mucus brown and green.   Swollen lymph nodes under neck that are tender.   Denies fever, chills, chest pain, SOB, n/v/d.   Prev episode: in August with sinusitis  No travel.  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  No sick contacts reported.    Message from patient reviewed:  See below  Arlene Nash, As you may already know [facility] weeks [facility]. I was diagnosed with the Flu. Will it be possible for you to call in some Amoxicillin 500Mg capsules to help clear a sinsues infection? If yes. [facility]   ======================================================  Previous HPI August 2019:  Acute.  Started about 1 to 2 weeks ago.  Was getting better but now is worse.  Patient has tried over-the-counter without relief.  Having sinus pressure here pressure headache and postnasal drainage         Current Assessment & Plan     Patient with subacute bacterial sinusitis with lymphadenopathy per self-reported exam.  Start amoxicillin.  Will avoid steroid at this time due to diabetes.  Patient advised to update diabetes labs and make follow-up appointment for that condition.    Reviewed labs with normal kidney and liver function.    Discussed condition course and signs and symptoms to expect.  Patient advised take anti-inflammatories and or Tylenol for pain or fever.  ER precautions.  Call MD or follow-up to clinic if not improving or worsening symptoms.              Other Visit Diagnoses     Lymphadenopathy    -  Primary    Cough               Past Medical History:  Past Medical History:   Diagnosis Date    Diabetes mellitus, type 2     Hypertension     Spermatocele 9/3/2019     History reviewed. No pertinent surgical history.  Review of patient's allergies indicates:  No Known Allergies  Medication List with Changes/Refills   New Medications    AMOXICILLIN (AMOXIL) 500 MG CAPSULE    Take 1 capsule (500 mg total) by mouth every 8 (eight) hours. for 10 days    PROMETHAZINE-CODEINE 6.25-10 MG/5 ML (PHENERGAN WITH CODEINE) 6.25-10 MG/5 ML SYRUP    Take 5 mLs by mouth every 4 (four) hours as needed for Cough.   Current Medications    ASPIRIN (ECOTRIN) 81 MG EC TABLET    Take 1 tablet (81 mg total) by mouth once daily.    ATORVASTATIN (LIPITOR) 40 MG TABLET    Take 1 tablet (40 mg total) by mouth once daily.    BENZONATATE (TESSALON) 200 MG CAPSULE        FLUTICASONE PROPIONATE (FLONASE) 50 MCG/ACTUATION NASAL SPRAY    1 spray (50 mcg total) by Each Nostril route 2 (two) times daily as needed.    GABAPENTIN (NEURONTIN) 300 MG CAPSULE    Take 1 capsule (300 mg total) by mouth 3 (three) times daily.    GLIMEPIRIDE (AMARYL) 4 MG TABLET    Take 2 tablets (8 mg total) by mouth daily with breakfast.    HYDROCHLOROTHIAZIDE (HYDRODIURIL) 12.5 MG TAB    Take 1 tablet (12.5 mg total) by mouth once daily.    LISINOPRIL (PRINIVIL,ZESTRIL) 5 MG TABLET    Take 1 tablet (5 mg total) by mouth once daily.    MECLIZINE (ANTIVERT) 12.5 MG TABLET    Take 2 tablets (25 mg total) by mouth 3 (three) times daily as needed for Dizziness.    METFORMIN (GLUCOPHAGE-XR) 500 MG 24 HR TABLET    Take 2 tablets (1,000 mg total) by mouth 2 (two) times daily with meals.    MV-MIN-FOLIC ACID-LUTEIN (ADULT MULTIVITAMIN, W-LUTEIN,) 200-137.5 MCG CHEW    Adult Multivitamin (w-lutein)   1 tablet by mouth daily    PANTOPRAZOLE (PROTONIX) 20 MG TABLET    Take 1 tablet (20 mg total) by mouth once daily.    PEPCID AC  20 MG TABLET    TK 1 T PO BID FOR 7 DAYS    SUCRALFATE (CARAFATE) 1 GRAM TABLET    Take 1 tablet (1 g total) by mouth 4 (four) times daily before meals and nightly.    TADALAFIL (CIALIS) 20 MG TAB    DISSOLVE 1 TO 2 TABLETS BY MOUTH EVERY DAY AS NEEDED FOR sex   Discontinued Medications    OSELTAMIVIR (TAMIFLU) 75 MG CAPSULE          Social History     Tobacco Use    Smoking status: Never Smoker   Substance Use Topics    Alcohol use: No     Frequency: Never     Drinks per session: 1 or 2     Binge frequency: Never      Family History   Problem Relation Age of Onset    Diabetes Mother     Heart disease Mother 63    Lung cancer Father        I have reviewed the complete PMH, social history, surgical history, allergies and medications.  As well as family history.    Review of Systems   See HPI otherwise negative.  Objective:   Patient did not take any vitals.  Denies fever.  Physical Exam   Constitutional: He is oriented to person, place, and time. He appears well-developed and well-nourished. No distress.   HENT:   Head: Normocephalic and atraumatic.   Eyes: Pupils are equal, round, and reactive to light. EOM are normal.   Oropharynx clear and moist.  No pharyngeal exudates by visualization  +cervical adenopathy palpated on patient self-exam  +tenderness to palpation of the frontal sinuses on patient self-exam  +tenderness to palpation of the maxillary sinuses on patient self-exam  Neck: Normal range of motion.   Pulmonary/Chest: Effort normal.   Musculoskeletal: Normal range of motion.   Neurological: He is alert and oriented to person, place, and time.   Skin: No rash noted. He is not diaphoretic.   Psychiatric: He has a normal mood and affect. His behavior is normal. Judgment and thought content normal. His mood appears not anxious. His affect is not angry, not blunt, not labile and not inappropriate. His speech is not rapid and/or pressured, not delayed, not tangential and not slurred. He is not agitated, not  aggressive, not hyperactive, not slowed, not withdrawn, not actively hallucinating and not combative. Thought content is not paranoid and not delusional. Cognition and memory are normal. Cognition and memory are not impaired. He does not express impulsivity or inappropriate judgment. He does not exhibit a depressed mood. He expresses no homicidal and no suicidal ideation. He expresses no suicidal plans and no homicidal plans. He is communicative. He is attentive.     Assessment:     1. Lymphadenopathy    2. Cough    3. Subacute pansinusitis    4. Type 2 diabetes mellitus with hyperglycemia, without long-term current use of insulin      MDM:   Moderate complexity.  Moderate risk  Patient seen through tele health during COVID 19 outbreak.  Patient does not be criteria for testing.  However patient advised to wear a mask while coughing and self isolate if having fever and worsening symptoms.  I have performed thorough medication reconciliation today and discussed risk and benefits of each medication.  I have reviewed labs.    I have signed for the following orders AND/OR meds.  No orders of the defined types were placed in this encounter.    Medications Ordered This Encounter   Medications    amoxicillin (AMOXIL) 500 MG capsule     Sig: Take 1 capsule (500 mg total) by mouth every 8 (eight) hours. for 10 days     Dispense:  30 capsule     Refill:  0    promethazine-codeine 6.25-10 mg/5 ml (PHENERGAN WITH CODEINE) 6.25-10 mg/5 mL syrup     Sig: Take 5 mLs by mouth every 4 (four) hours as needed for Cough.     Dispense:  240 mL     Refill:  0        Follow up in about 3 months (around 6/25/2020), or if symptoms worsen or fail to improve, for DM, Med refills.    If no improvement in symptoms or symptoms worsen, advised to call/follow-up at clinic or go to ER. Patient voiced understanding and all questions/concerns were addressed.     Follow up:  If there are any questions or problems with the prescription, call  [phone] anytime for assistance.   1. please schedule a virtual follow up visit first.  2. Please see an in-person provider if your symptoms are worsening or not improving in 2-3 days.   3. Please print a copy of this note and send it to your regular doctor, or take it to your next visit so it may be included in your medical record.   4. Contact customer support at [phone] for questions or concerns   You must understand that you've received a Telehealth Urgent Care treatment only and that you may be released before all your medical problems are known or treated. You, the patient, will arrange for follow up care as instructed.  Follow up with your PCP or specialty clinic as directed in the next 1-2 days if not improved or as needed.  You can call [phone] to schedule an appointment with the appropriate provider in [place].   If your condition worsens we recommend that you receive another evaluation at the emergency room immediately or contact your primary medical clinics after hours call service to discuss your concerns.  Please go to an Urgent Care or go to the Emergency Department for any concerns or worsening of condition.    DISCLAIMER: This note was compiled by using a speech recognition dictation system and therefore please be aware that typographical / speech recognition errors can and do occur.  Please contact me if you see any errors specifically.    Charles Nash M.D.       Office: [phone] [address] [place] [address]  FAX: [phone]

## 2020-03-25 NOTE — TELEPHONE ENCOUNTER
Attempted to return pharmacy call and received a message that pharmacy was not yet open for callers.

## 2020-04-17 ENCOUNTER — TELEPHONE (OUTPATIENT)
Dept: FAMILY MEDICINE | Facility: CLINIC | Age: 60
End: 2020-04-17

## 2020-04-17 NOTE — TELEPHONE ENCOUNTER
Patient wants to come in on 5/5/2020 for an Annual Exam.  His wife has an appointment on 5/5/2020 @ 1:20.  There aren't any appointments available on that day, do you want us to double book this patient?

## 2020-04-17 NOTE — TELEPHONE ENCOUNTER
----- Message from Ashlee Kelly sent at 4/17/2020  7:33 AM CDT -----  Contact: pt wife  Type:  Sooner Apoointment Request    Caller is requesting a sooner appointment.  Caller declined first available appointment listed below.  Caller will not accept being placed on the waitlist and is requesting a message be sent to doctor.  Name of Caller: pt wife   When is the first available appointment?  Symptoms: annual physical  Would the patient rather a call back or a response via [employer]? call  Best Call Back Number: [phone]  Additional Information: pt is requesting a call back from the nurse in regards to the pt wanting to get in to see the Dr on  05/05/2020 with his wife

## 2020-04-30 ENCOUNTER — TELEPHONE (OUTPATIENT)
Dept: FAMILY MEDICINE | Facility: CLINIC | Age: 60
End: 2020-04-30

## 2020-04-30 ENCOUNTER — OFFICE VISIT (OUTPATIENT)
Dept: FAMILY MEDICINE | Facility: CLINIC | Age: 60
End: 2020-04-30
Payer: COMMERCIAL

## 2020-04-30 DIAGNOSIS — J32.9 SINUSITIS, UNSPECIFIED CHRONICITY, UNSPECIFIED LOCATION: Primary | ICD-10-CM

## 2020-04-30 PROCEDURE — 99214 OFFICE O/P EST MOD 30 MIN: CPT | Mod: 95,,, | Performed by: NURSE PRACTITIONER

## 2020-04-30 PROCEDURE — 99214 PR OFFICE/OUTPT VISIT, EST, LEVL IV, 30-39 MIN: ICD-10-PCS | Mod: 95,,, | Performed by: NURSE PRACTITIONER

## 2020-04-30 RX ORDER — AMOXICILLIN 875 MG/1
875 TABLET, FILM COATED ORAL EVERY 12 HOURS
Qty: 20 TABLET | Refills: 0 | Status: SHIPPED | OUTPATIENT
Start: 2020-04-30 | End: 2020-05-10

## 2020-04-30 RX ORDER — BROMPHENIRAMINE MALEATE, PSEUDOEPHEDRINE HYDROCHLORIDE, AND DEXTROMETHORPHAN HYDROBROMIDE 2; 30; 10 MG/5ML; MG/5ML; MG/5ML
5 SYRUP ORAL 2 TIMES DAILY PRN
Qty: 240 ML | Refills: 0 | Status: SHIPPED | OUTPATIENT
Start: 2020-04-30 | End: 2020-05-10

## 2020-04-30 RX ORDER — AZELASTINE 1 MG/ML
1 SPRAY, METERED NASAL 2 TIMES DAILY
Qty: 30 ML | Refills: 0 | Status: SHIPPED | OUTPATIENT
Start: 2020-04-30 | End: 2022-06-20

## 2020-04-30 NOTE — TELEPHONE ENCOUNTER
----- Message from Farida Odell sent at 4/30/2020  8:59 AM CDT -----  Contact: Pt  Pt is requesting call back in regards to questions about medication refills          Pls call back at [phone]

## 2020-04-30 NOTE — TELEPHONE ENCOUNTER
Returned call to patient, an appointment was set up for the patient with Kathya Pretty NP for today at 3 pm audio visit.  Patient verbalzied understanding of this.

## 2020-04-30 NOTE — PROGRESS NOTES
Subjective:       Patient ID: Luis Lozano is a 59 y.o. male.    Chief Complaint: No chief complaint on file.  Established Patient - Audio Only Telehealth Visit     The patient location is: Pt's job; [place]  The chief complaint leading to consultation is: sinusitis;   Visit type: Virtual visit with audio only (telephone); 15 min     The reason for the audio only service rather than synchronous audio and video virtual visit was related to technical difficulties or patient preference/necessity.     Each patient to whom I provide medical services by telemedicine is:  (1) informed of the relationship between the physician and patient and the respective role of any other health care provider with respect to management of the patient; and (2) notified that they may decline to receive medical services by telemedicine and may withdraw from such care at any time. Patient verbally consented to receive this service via voice-only telephone call.     This service was not originating from a related E/M service provided within the previous 7 days nor will  to an E/M service or procedure within the next 24 hours or my soonest available appointment.  Prevailing standard of care was able to be met in this audio-only visit.    Sinus Problem   This is a new problem. The current episode started 1 to 4 weeks ago. The problem is unchanged. There has been no fever. The pain is mild. Associated symptoms include congestion, coughing (requests bromfed for cough) and sinus pressure. Pertinent negatives include no chills, diaphoresis, ear pain, headaches, hoarse voice, neck pain, shortness of breath, sneezing, sore throat or swollen glands. Past treatments include nothing. The treatment provided no relief.     Past Medical History:   Diagnosis Date    Diabetes mellitus, type 2     Hypertension     Spermatocele 9/3/2019     Social History     Socioeconomic History    Marital status:      Spouse name: Not on file    Number  of children: Not on file    Years of education: Not on file    Highest education level: Not on file   Occupational History    Not on file   Social Needs    Financial resource strain: Very hard    Food insecurity:     Worry: Never true     Inability: Never true    Transportation needs:     Medical: No     Non-medical: No   Tobacco Use    Smoking status: Never Smoker   Substance and Sexual Activity    Alcohol use: No     Frequency: Never     Drinks per session: 1 or 2     Binge frequency: Never    Drug use: No    Sexual activity: Not on file   Lifestyle    Physical activity:     Days per week: Not on file     Minutes per session: Not on file    Stress: Not on file   Relationships    Social connections:     Talks on phone: More than three times a week     Gets together: Never     Attends Congregation service: More than 4 times per year     Active member of club or organization: Yes     Attends meetings of clubs or organizations: More than 4 times per year     Relationship status:    Other Topics Concern    Not on file   Social History Narrative    Not on file     History reviewed. No pertinent surgical history.    Review of Systems   Constitutional: Negative.  Negative for chills and diaphoresis.   HENT: Positive for congestion, postnasal drip, rhinorrhea, sinus pressure and sinus pain. Negative for ear pain, hoarse voice, sneezing and sore throat.    Eyes: Negative.    Respiratory: Positive for cough (requests bromfed for cough). Negative for shortness of breath.    Cardiovascular: Negative.    Gastrointestinal: Negative.    Endocrine: Negative.    Genitourinary: Negative.    Musculoskeletal: Negative.  Negative for neck pain.   Skin: Negative.    Allergic/Immunologic: Negative.    Neurological: Negative.  Negative for headaches.   Psychiatric/Behavioral: Negative.        Objective:      Physical Exam    Assessment:       1. Sinusitis, unspecified chronicity, unspecified location        Plan:            Diagnoses and all orders for this visit:    Sinusitis, unspecified chronicity, unspecified location  -     amoxicillin (AMOXIL) 875 MG tablet; Take 1 tablet (875 mg total) by mouth every 12 (twelve) hours. for 10 days  -     azelastine (ASTELIN) 137 mcg (0.1 %) nasal spray; 1 spray (137 mcg total) by Nasal route 2 (two) times daily. for 7 days  Bromfed request sent to PCP to approve  Report to ER immediately if symptoms worsen

## 2020-04-30 NOTE — TELEPHONE ENCOUNTER
Returned call to patient, patient states that he missed his lab appointment but was calling stating that he wants a prescription of bromphen/pseudo dextro hdr syrup as he has a slight cough with nasal drip and amoxicillin as he has a swollen lymp node in his neck he thinks.  Please advise.

## 2020-06-02 ENCOUNTER — PATIENT OUTREACH (OUTPATIENT)
Dept: ADMINISTRATIVE | Facility: HOSPITAL | Age: 60
End: 2020-06-02

## 2020-06-11 ENCOUNTER — PATIENT OUTREACH (OUTPATIENT)
Dept: ADMINISTRATIVE | Facility: HOSPITAL | Age: 60
End: 2020-06-11

## 2020-06-11 NOTE — PROGRESS NOTES
Health Maintenance Updated.   Immunizations: Abstracted.   Care Everywhere: Abstracted: No new results found.      Health Maintenance Due   Topic    TETANUS VACCINE     PROSTATE-SPECIFIC ANTIGEN     Eye Exam     Hemoglobin A1c      HA1c: Scheduled 6/25/2020

## 2020-06-24 RX ORDER — BROMPHENIRAMINE MALEATE, PSEUDOEPHEDRINE HYDROCHLORIDE, AND DEXTROMETHORPHAN HYDROBROMIDE 2; 30; 10 MG/5ML; MG/5ML; MG/5ML
SYRUP ORAL
Qty: 240 ML | Refills: 0 | OUTPATIENT
Start: 2020-06-24

## 2020-06-25 NOTE — TELEPHONE ENCOUNTER
Appointment scheduled for patient 07/15/2020 at 10:40 am.  Reminder letter mailed to address on file.

## 2020-06-29 PROBLEM — J01.40 SUBACUTE PANSINUSITIS: Status: RESOLVED | Noted: 2019-08-05 | Resolved: 2020-06-29

## 2020-08-05 ENCOUNTER — LAB VISIT (OUTPATIENT)
Dept: LAB | Facility: HOSPITAL | Age: 60
End: 2020-08-05
Attending: FAMILY MEDICINE
Payer: COMMERCIAL

## 2020-08-05 DIAGNOSIS — E11.8 TYPE 2 DIABETES MELLITUS WITH COMPLICATION: ICD-10-CM

## 2020-08-05 LAB
ESTIMATED AVG GLUCOSE: 183 MG/DL (ref 68–131)
HBA1C MFR BLD HPLC: 8 % (ref 4–5.6)

## 2020-08-05 PROCEDURE — 83036 HEMOGLOBIN GLYCOSYLATED A1C: CPT

## 2020-08-05 PROCEDURE — 36415 COLL VENOUS BLD VENIPUNCTURE: CPT | Mod: PO

## 2020-08-06 NOTE — PROGRESS NOTES
Please CALL patient with results and Document verification.   [phone]    A1c is slightly increased from previous.  Will discuss at follow-up office visit.  For now I recommend improving diet with decreased carbohydrates.  Repeat A1c in three months.

## 2020-08-24 DIAGNOSIS — M79.642 LEFT HAND PAIN: Primary | ICD-10-CM

## 2020-08-24 DIAGNOSIS — I10 ESSENTIAL HYPERTENSION: ICD-10-CM

## 2020-08-24 RX ORDER — HYDROCHLOROTHIAZIDE 12.5 MG/1
TABLET ORAL
Qty: 90 TABLET | Refills: 0 | Status: SHIPPED | OUTPATIENT
Start: 2020-08-24 | End: 2020-09-09 | Stop reason: SDUPTHER

## 2020-08-24 RX ORDER — METFORMIN HYDROCHLORIDE 500 MG/1
TABLET, EXTENDED RELEASE ORAL
Qty: 360 TABLET | Refills: 0 | Status: SHIPPED | OUTPATIENT
Start: 2020-08-24 | End: 2020-09-09 | Stop reason: SDUPTHER

## 2020-08-25 NOTE — TELEPHONE ENCOUNTER
Patient wants MRI order sent to [facility] so he can have MRI done and get results before he sees you on September 9th.

## 2020-08-26 ENCOUNTER — TELEPHONE (OUTPATIENT)
Dept: FAMILY MEDICINE | Facility: CLINIC | Age: 60
End: 2020-08-26

## 2020-08-26 NOTE — TELEPHONE ENCOUNTER
This message has been forwarded to Aishwarya Klein/Linda Hooker over Radiology  They will follow up with you

## 2020-08-31 ENCOUNTER — TELEPHONE (OUTPATIENT)
Dept: FAMILY MEDICINE | Facility: CLINIC | Age: 60
End: 2020-08-31

## 2020-08-31 DIAGNOSIS — M25.532 LEFT WRIST PAIN: ICD-10-CM

## 2020-08-31 NOTE — TELEPHONE ENCOUNTER
Called and spoke with the patient, patient was notified that [facility] is wanting a new order for his Mri due to wanting to perform an Mri on his wrist instead of his hand, advised patient that in order for his insurance to pay appropriately then the correct order must be ordered and an office visit needs to be scheduled so that he is evaluated for the correct body part.  Patient states that he will cancel the Mri scheduled Saturday until after his 09/09/2020 appointment with Dr. Nash so that the correct body part is scanned.

## 2020-08-31 NOTE — TELEPHONE ENCOUNTER
----- Message from Dwayne Hung sent at 8/31/2020  8:28 AM CDT -----  Regarding: Patient  Type:  Needs Medical Advice    Who Called: Anuja france/ Barrett Johnson   Symptoms (please be specific):   How long has patient had these symptoms:    Pharmacy name and phone #:    Would the patient rather a call back or a response via [facility]? Call   Best Call Back Number: [phone]  Additional Information: Anuja called stating that pt was in for MRI for hand however pt has swelling in the wrist so will need an authorization to do so

## 2020-08-31 NOTE — TELEPHONE ENCOUNTER
----- Message from Chantale Rodrigues sent at 8/31/2020 12:14 PM CDT -----  Contact: [facility](bowen)[phone]  Would like to consult with nurse regarding authorization on mri for wrist. Also states his appt is on Saturday.  Please call back [phone]. Thanks

## 2020-08-31 NOTE — TELEPHONE ENCOUNTER
Returned call to Anuja who states that patient arrived at [facility] for his Mri of the left hand and the Mri tech noticed that the patient's left wrist was swollen and placed a note to have someone call our office today to request Mri Of Left Wrist as the Mri of the Hand will not get the area that is swollen, was advised that no new authorization was needed as the codes are the same as the hand per Anuja at [facility], per Anuja all needed was an order.  Please advise as the patient has been rescheduled by [facility] for Saturday 09/05/2020.  Please advise.

## 2020-09-09 ENCOUNTER — OFFICE VISIT (OUTPATIENT)
Dept: FAMILY MEDICINE | Facility: CLINIC | Age: 60
End: 2020-09-09
Payer: COMMERCIAL

## 2020-09-09 ENCOUNTER — TELEPHONE (OUTPATIENT)
Dept: FAMILY MEDICINE | Facility: CLINIC | Age: 60
End: 2020-09-09

## 2020-09-09 VITALS
TEMPERATURE: 98 F | WEIGHT: 211.19 LBS | BODY MASS INDEX: 31.28 KG/M2 | DIASTOLIC BLOOD PRESSURE: 89 MMHG | HEART RATE: 70 BPM | SYSTOLIC BLOOD PRESSURE: 138 MMHG | HEIGHT: 69 IN

## 2020-09-09 DIAGNOSIS — Z00.01 ENCOUNTER FOR GENERAL ADULT MEDICAL EXAMINATION WITH ABNORMAL FINDINGS: Primary | ICD-10-CM

## 2020-09-09 DIAGNOSIS — Z12.5 ENCOUNTER FOR PROSTATE CANCER SCREENING: ICD-10-CM

## 2020-09-09 DIAGNOSIS — E78.5 HYPERLIPIDEMIA, UNSPECIFIED HYPERLIPIDEMIA TYPE: ICD-10-CM

## 2020-09-09 DIAGNOSIS — Z13.6 ENCOUNTER FOR LIPID SCREENING FOR CARDIOVASCULAR DISEASE: ICD-10-CM

## 2020-09-09 DIAGNOSIS — E11.59 HYPERTENSION ASSOCIATED WITH DIABETES: ICD-10-CM

## 2020-09-09 DIAGNOSIS — I15.2 HYPERTENSION ASSOCIATED WITH DIABETES: ICD-10-CM

## 2020-09-09 DIAGNOSIS — M54.16 RADICULOPATHY, LUMBAR REGION: ICD-10-CM

## 2020-09-09 DIAGNOSIS — E11.69 HYPERLIPIDEMIA ASSOCIATED WITH TYPE 2 DIABETES MELLITUS: ICD-10-CM

## 2020-09-09 DIAGNOSIS — M25.532 LEFT WRIST PAIN: ICD-10-CM

## 2020-09-09 DIAGNOSIS — E11.65 TYPE 2 DIABETES MELLITUS WITH HYPERGLYCEMIA, WITHOUT LONG-TERM CURRENT USE OF INSULIN: Chronic | ICD-10-CM

## 2020-09-09 DIAGNOSIS — Z13.220 ENCOUNTER FOR LIPID SCREENING FOR CARDIOVASCULAR DISEASE: ICD-10-CM

## 2020-09-09 DIAGNOSIS — Z79.899 ENCOUNTER FOR LONG-TERM (CURRENT) USE OF MEDICATIONS: ICD-10-CM

## 2020-09-09 DIAGNOSIS — E78.5 HYPERLIPIDEMIA ASSOCIATED WITH TYPE 2 DIABETES MELLITUS: ICD-10-CM

## 2020-09-09 PROCEDURE — 99999 PR PBB SHADOW E&M-EST. PATIENT-LVL IV: CPT | Mod: PBBFAC,,, | Performed by: FAMILY MEDICINE

## 2020-09-09 PROCEDURE — 3008F BODY MASS INDEX DOCD: CPT | Mod: CPTII,S$GLB,, | Performed by: FAMILY MEDICINE

## 2020-09-09 PROCEDURE — 99999 PR PBB SHADOW E&M-EST. PATIENT-LVL IV: ICD-10-PCS | Mod: PBBFAC,,, | Performed by: FAMILY MEDICINE

## 2020-09-09 PROCEDURE — 3079F PR MOST RECENT DIASTOLIC BLOOD PRESSURE 80-89 MM HG: ICD-10-PCS | Mod: CPTII,S$GLB,, | Performed by: FAMILY MEDICINE

## 2020-09-09 PROCEDURE — 99213 OFFICE O/P EST LOW 20 MIN: CPT | Mod: 25,S$GLB,, | Performed by: FAMILY MEDICINE

## 2020-09-09 PROCEDURE — 3052F HG A1C>EQUAL 8.0%<EQUAL 9.0%: CPT | Mod: CPTII,S$GLB,, | Performed by: FAMILY MEDICINE

## 2020-09-09 PROCEDURE — 99396 PR PREVENTIVE VISIT,EST,40-64: ICD-10-PCS | Mod: S$GLB,,, | Performed by: FAMILY MEDICINE

## 2020-09-09 PROCEDURE — 99213 PR OFFICE/OUTPT VISIT, EST, LEVL III, 20-29 MIN: ICD-10-PCS | Mod: 25,S$GLB,, | Performed by: FAMILY MEDICINE

## 2020-09-09 PROCEDURE — 3008F PR BODY MASS INDEX (BMI) DOCUMENTED: ICD-10-PCS | Mod: CPTII,S$GLB,, | Performed by: FAMILY MEDICINE

## 2020-09-09 PROCEDURE — 3075F PR MOST RECENT SYSTOLIC BLOOD PRESS GE 130-139MM HG: ICD-10-PCS | Mod: CPTII,S$GLB,, | Performed by: FAMILY MEDICINE

## 2020-09-09 PROCEDURE — 3079F DIAST BP 80-89 MM HG: CPT | Mod: CPTII,S$GLB,, | Performed by: FAMILY MEDICINE

## 2020-09-09 PROCEDURE — 3075F SYST BP GE 130 - 139MM HG: CPT | Mod: CPTII,S$GLB,, | Performed by: FAMILY MEDICINE

## 2020-09-09 PROCEDURE — 99396 PREV VISIT EST AGE 40-64: CPT | Mod: S$GLB,,, | Performed by: FAMILY MEDICINE

## 2020-09-09 PROCEDURE — 3052F PR MOST RECENT HEMOGLOBIN A1C LEVEL 8.0 - < 9.0%: ICD-10-PCS | Mod: CPTII,S$GLB,, | Performed by: FAMILY MEDICINE

## 2020-09-09 RX ORDER — HYDROCHLOROTHIAZIDE 25 MG/1
25 TABLET ORAL DAILY
Qty: 90 TABLET | Refills: 4 | Status: SHIPPED | OUTPATIENT
Start: 2020-09-09 | End: 2021-10-15

## 2020-09-09 RX ORDER — DICLOFENAC SODIUM 10 MG/G
GEL TOPICAL
COMMUNITY
Start: 2020-06-23 | End: 2021-01-28

## 2020-09-09 RX ORDER — LISINOPRIL 5 MG/1
5 TABLET ORAL DAILY
Qty: 90 TABLET | Refills: 3 | Status: SHIPPED | OUTPATIENT
Start: 2020-09-09 | End: 2022-06-20 | Stop reason: SDUPTHER

## 2020-09-09 RX ORDER — ATORVASTATIN CALCIUM 40 MG/1
40 TABLET, FILM COATED ORAL DAILY
Qty: 90 TABLET | Refills: 3 | Status: SHIPPED | OUTPATIENT
Start: 2020-09-09 | End: 2022-06-20 | Stop reason: SDUPTHER

## 2020-09-09 RX ORDER — MELOXICAM 15 MG/1
15 TABLET ORAL DAILY
Qty: 30 TABLET | Refills: 0 | Status: SHIPPED | OUTPATIENT
Start: 2020-09-09 | End: 2020-10-09

## 2020-09-09 RX ORDER — GABAPENTIN 300 MG/1
300 CAPSULE ORAL 3 TIMES DAILY
Qty: 270 CAPSULE | Refills: 3 | Status: SHIPPED | OUTPATIENT
Start: 2020-09-09 | End: 2023-11-27 | Stop reason: SDUPTHER

## 2020-09-09 RX ORDER — METFORMIN HYDROCHLORIDE 500 MG/1
TABLET, EXTENDED RELEASE ORAL
COMMUNITY
Start: 2020-08-24 | End: 2020-09-09 | Stop reason: SDUPTHER

## 2020-09-09 RX ORDER — GLIMEPIRIDE 4 MG/1
8 TABLET ORAL
Qty: 180 TABLET | Refills: 3 | Status: SHIPPED | OUTPATIENT
Start: 2020-09-09 | End: 2022-06-20 | Stop reason: SDUPTHER

## 2020-09-09 RX ORDER — METFORMIN HYDROCHLORIDE 500 MG/1
1000 TABLET, EXTENDED RELEASE ORAL 2 TIMES DAILY WITH MEALS
Qty: 360 TABLET | Refills: 3 | Status: SHIPPED | OUTPATIENT
Start: 2020-09-09 | End: 2020-12-18 | Stop reason: SDUPTHER

## 2020-09-09 NOTE — PROGRESS NOTES
This note is specifically for wellness visit performed today.     WELLNESS EXAM      Patient ID: Luis Lozano is a 60 y.o. male.  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     Chief Complaint:  Encounter for wellness exam    Well Adult Physical: Patient here for a comprehensive physical exam.The patient reports acute and chronic problems.  See other note for details.  Do you take any herbs or supplements that were not prescribed by a doctor? no   Are you taking calcium supplements? no      History:  None reported  UROLOGIST:  None reported, due for PSA    Date last PSA: Reviewed  Lab Results   Component Value Date    PSA 1.1 06/03/2017      No history of STDs    Health Maintenance Topics with due status: Not Due       Topic Last Completion Date    Colorectal Cancer Screening 07/25/2017    Lipid Panel 11/22/2019    Foot Exam 11/27/2019    Eye Exam 07/07/2020    Hemoglobin A1c 08/05/2020    Low Dose Statin 09/09/2020        ==============================================  History reviewed.     Health Maintenance Due   Topic Date Due    TETANUS VACCINE  05/06/1976    Shingles Vaccine (1 of 2) 05/06/2008    PROSTATE-SPECIFIC ANTIGEN  06/03/2018    Influenza Vaccine (1) 08/01/2020     Patient refused vaccines today.  Past Medical History:  Past Medical History:   Diagnosis Date    Diabetes mellitus, type 2     Hypertension     Spermatocele 9/3/2019     History reviewed. No pertinent surgical history.  Review of patient's allergies indicates:  No Known Allergies  Current Outpatient Medications on File Prior to Visit   Medication Sig Dispense Refill    aspirin (ECOTRIN) 81 MG EC tablet Take 1 tablet (81 mg total) by mouth once daily.      azelastine (ASTELIN) 137 mcg (0.1 %) nasal spray 1 spray (137 mcg total) by Nasal route 2 (two) times daily. for 7 days 30 mL 0    diclofenac sodium (VOLTAREN) 1 % Gel       fluticasone propionate (FLONASE) 50  mcg/actuation nasal spray 1 spray (50 mcg total) by Each Nostril route 2 (two) times daily as needed. 15 g 0    meclizine (ANTIVERT) 12.5 mg tablet Take 2 tablets (25 mg total) by mouth 3 (three) times daily as needed for Dizziness. 30 tablet 5    mv-min-folic acid-lutein (ADULT MULTIVITAMIN, W-LUTEIN,) 200-137.5 mcg Chew Adult Multivitamin (w-lutein)   1 tablet by mouth daily      [DISCONTINUED] atorvastatin (LIPITOR) 40 MG tablet Take 1 tablet by mouth once daily 90 tablet 0    [DISCONTINUED] gabapentin (NEURONTIN) 300 MG capsule Take 1 capsule (300 mg total) by mouth 3 (three) times daily. (Patient taking differently: Take 300 mg by mouth as needed. ) 270 capsule 3    [DISCONTINUED] glimepiride (AMARYL) 4 MG tablet Take 2 tablets (8 mg total) by mouth daily with breakfast. 180 tablet 3    [DISCONTINUED] hydroCHLOROthiazide (HYDRODIURIL) 12.5 MG Tab Take 1 tablet by mouth once daily 90 tablet 0    [DISCONTINUED] lisinopril (PRINIVIL,ZESTRIL) 5 MG tablet Take 1 tablet (5 mg total) by mouth once daily. 90 tablet 3    [DISCONTINUED] metFORMIN (GLUCOPHAGE-XR) 500 MG ER 24hr tablet TAKE 2 TABLETS BY MOUTH TWICE DAILY WITH MEALS 360 tablet 0    [DISCONTINUED] metFORMIN (GLUCOPHAGE-XR) 500 MG ER 24hr tablet TAKE 2 TABLETS BY MOUTH TWICE DAILY WITH MEALS      [DISCONTINUED] benzonatate (TESSALON) 200 MG capsule       [DISCONTINUED] pantoprazole (PROTONIX) 20 MG tablet Take 1 tablet (20 mg total) by mouth once daily. 30 tablet 0    [DISCONTINUED] PEPCID AC 20 mg tablet TK 1 T PO BID FOR 7 DAYS      [DISCONTINUED] sucralfate (CARAFATE) 1 gram tablet Take 1 tablet (1 g total) by mouth 4 (four) times daily before meals and nightly. 20 tablet 1    [DISCONTINUED] tadalafiL (CIALIS) 20 MG Tab DISSOLVE 1 TO 2 TABLETS BY MOUTH EVERY DAY AS NEEDED FOR sex       No current facility-administered medications on file prior to visit.      Social History     Socioeconomic History    Marital status:      Spouse name:  Not on file    Number of children: Not on file    Years of education: Not on file    Highest education level: Not on file   Occupational History    Not on file   Social Needs    Financial resource strain: Very hard    Food insecurity     Worry: Never true     Inability: Never true    Transportation needs     Medical: No     Non-medical: No   Tobacco Use    Smoking status: Never Smoker    Smokeless tobacco: Never Used   Substance and Sexual Activity    Alcohol use: No     Frequency: Never     Drinks per session: 1 or 2     Binge frequency: Never    Drug use: No    Sexual activity: Not on file   Lifestyle    Physical activity     Days per week: Not on file     Minutes per session: Not on file    Stress: Not on file   Relationships    Social connections     Talks on phone: More than three times a week     Gets together: Never     Attends Christianity service: More than 4 times per year     Active member of club or organization: Yes     Attends meetings of clubs or organizations: More than 4 times per year     Relationship status:    Other Topics Concern    Not on file   Social History Narrative    Not on file     Family History   Problem Relation Age of Onset    Diabetes Mother             Heart disease Mother 63    Lung cancer Father     Hypertension Maternal Grandmother        Vitals:    20 1053   BP: 138/89   Pulse: 70   Temp: 97.9 °F (36.6 °C)      Body mass index is 31.19 kg/m².     Review of Systems     See other note for full review of systems.  Otherwise Negative.  Objective:      Physical Exam  Vitals signs and nursing note reviewed.   Constitutional:       General: He is not in acute distress.     Appearance: He is well-developed.   HENT:      Head: Normocephalic and atraumatic.   Eyes:      Pupils: Pupils are equal, round, and reactive to light.   Neck:      Musculoskeletal: Normal range of motion and neck supple.   Cardiovascular:      Rate and Rhythm: Normal rate and  regular rhythm.      Heart sounds: Normal heart sounds. No murmur.   Pulmonary:      Effort: Pulmonary effort is normal. No respiratory distress.      Breath sounds: Normal breath sounds. No wheezing.   Musculoskeletal: Normal range of motion.   Skin:     General: Skin is warm and dry.      Capillary Refill: Capillary refill takes less than 2 seconds.   Neurological:      Mental Status: He is alert and oriented to person, place, and time.      Cranial Nerves: No cranial nerve deficit.   Psychiatric:         Behavior: Behavior normal.      See other note for abnormalities     Assessment / Plan:      1.  Routine health exam-patient here for annual wellness exam.  Labs ordered.  Health maintenance was reviewed and ordered.    Complete history and physical was completed today.  Complete and thorough medication reconciliation was performed.  Discussed risks and benefits of medications.  Advised patient on orders and health maintenance.  We discussed old records and old labs if available.  Will request any records not available through epic.  Continue current medications listed on your summary sheet.    All questions were answered. Patient had no further concerns. Advised of diagnoses and plan. Follow up as planned or return sooner if symptoms persist or worsen.     Orders Placed This Encounter   Procedures    MRI Wrist Joint Without Contrast Left     Standing Status:   Future     Number of Occurrences:   1     Standing Expiration Date:   9/9/2021     Order Specific Question:   Does the patient have a pacemaker or a defibrilator?     Answer:   No     Order Specific Question:   Does the patient have a cerebral aneurysm or surgical clip, pump, nerve or brain stimulator, middle or inner ear prosthesis, or other metal implant or  been injured by a metal object(i.e. bullet, bb, shrapnel)?     Answer:   No     Order Specific Question:   Is the patient claustrophobic?     Answer:   No     Order Specific Question:   Will the  patient require sedation?     Answer:   No     Order Specific Question:   Does the patient have any of the following conditions? Diabetes, History of Renal Disease or Hypertension requiring medical therapy?     Answer:   Yes     Order Specific Question:   May the Radiologist modify the order per protocol to meet the clinical needs of the patient?     Answer:   Yes     Order Specific Question:   Is this part of a Research Study?     Answer:   No     Order Specific Question:   Does the patient have on a skin patch for medication with aluminized backing?     Answer:   No    CBC Without Differential     Standing Status:   Standing     Number of Occurrences:   99     Standing Expiration Date:   11/8/2021    TSH     Standing Status:   Standing     Number of Occurrences:   99     Standing Expiration Date:   11/8/2021    Comprehensive metabolic panel     Standing Status:   Standing     Number of Occurrences:   99     Standing Expiration Date:   9/4/2040    Hemoglobin A1C     Standing Status:   Standing     Number of Occurrences:   99     Standing Expiration Date:   9/4/2040    Lipid Panel     Standing Status:   Standing     Number of Occurrences:   99     Standing Expiration Date:   9/4/2040    PSA, Screening     Standing Status:   Future     Standing Expiration Date:   11/8/2021       Medications Ordered This Encounter   Medications    atorvastatin (LIPITOR) 40 MG tablet     Sig: Take 1 tablet (40 mg total) by mouth once daily.     Dispense:  90 tablet     Refill:  3    gabapentin (NEURONTIN) 300 MG capsule     Sig: Take 1 capsule (300 mg total) by mouth 3 (three) times daily.     Dispense:  270 capsule     Refill:  3    glimepiride (AMARYL) 4 MG tablet     Sig: Take 2 tablets (8 mg total) by mouth daily with breakfast.     Dispense:  180 tablet     Refill:  3    hydroCHLOROthiazide (HYDRODIURIL) 25 MG tablet     Sig: Take 1 tablet (25 mg total) by mouth once daily.     Dispense:  90 tablet     Refill:  4     .     lisinopriL (PRINIVIL,ZESTRIL) 5 MG tablet     Sig: Take 1 tablet (5 mg total) by mouth once daily.     Dispense:  90 tablet     Refill:  3     .    meloxicam (MOBIC) 15 MG tablet     Sig: Take 1 tablet (15 mg total) by mouth once daily.     Dispense:  30 tablet     Refill:  0    metFORMIN (GLUCOPHAGE-XR) 500 MG ER 24hr tablet     Sig: Take 2 tablets (1,000 mg total) by mouth 2 (two) times daily with meals.     Dispense:  360 tablet     Refill:  3        Charles Nash MD

## 2020-09-09 NOTE — ASSESSMENT & PLAN NOTE
Check MRI.  Rule out occult fracture.  Patient advised to avoid heavy lifting and use of the left wrist.Discussed condition course and signs and symptoms to expect.  Patient advised take anti-inflammatories and or Tylenol for pain. ER precautions.  Call MD or follow-up to clinic if not improving or worsening symptoms.

## 2020-09-09 NOTE — ASSESSMENT & PLAN NOTE
Update lab orders for annual wellness.Complete history and physical was completed today.  Complete and thorough medication reconciliation was performed.  Discussed risks and benefits of medications.  Advised patient on orders and health maintenance.  We discussed old records and old labs if available.  Will request any records not available through epic.  Continue current medications listed on your summary sheet.

## 2020-09-09 NOTE — ASSESSMENT & PLAN NOTE
Increase hydrochlorothiazide 25 mg.  Patient will report back to me if blood pressure is greater than 140/90.Discussed hypertension disease course, DASH-diet and exercise.  Discussed medication regimen importance of treating high blood pressure.  Patient advised of risk of untreated blood pressure.    ER precautions were given for symptoms of hypertensive urgency and emergency.

## 2020-09-09 NOTE — ASSESSMENT & PLAN NOTE
A1c is 8.0.  Patient has started dieting and exercising again.  Recheck A1c in three months.  Continue current medication regimen.  Monitor hemoglobin A1c.  Discussed diabetic diet and exercise protocol.  Continue medications.  Monitor for side effects.  Discussed checking blood glucose.  Discussed symptoms to monitor for and to notify me immediately if persistent or worsening.  Follow up with Ophthalmology/Optometry and Podiatry.

## 2020-09-09 NOTE — PATIENT INSTRUCTIONS
Follow up in about 3 months (around 12/9/2020), or if symptoms worsen or fail to improve, for DM, LAB RESULTS.     If no improvement in symptoms or symptoms worsen, please be advised to call MD, follow-up at clinic and/or go to ER if becomes severe.    Charles Nash M.D.        We Offer TELEHEALTH & Same Day Appointments!   Book your Telehealth appointment with me through my nurse or   Clinic appointments on [employer]!    [address] [place] [address]    Office: [phone]   FAX: [phone]    Check out my Facebook Page and Follow Me at: [email or web address]    Check out my website at [employer] by clicking on: [email or web address]    To Schedule appointments online, go to [email or web address]: [email or web address]

## 2020-09-09 NOTE — PROGRESS NOTES
PLAN:      Problem List Items Addressed This Visit     Hypertension associated with diabetes (Chronic)     Increase hydrochlorothiazide 25 mg.  Patient will report back to me if blood pressure is greater than 140/90.Discussed hypertension disease course, DASH-diet and exercise.  Discussed medication regimen importance of treating high blood pressure.  Patient advised of risk of untreated blood pressure.    ER precautions were given for symptoms of hypertensive urgency and emergency.           Relevant Medications    hydroCHLOROthiazide (HYDRODIURIL) 25 MG tablet    glimepiride (AMARYL) 4 MG tablet    metFORMIN (GLUCOPHAGE-XR) 500 MG ER 24hr tablet    Hyperlipidemia associated with type 2 diabetes mellitus (Chronic)     Discussed hyperlipidemia disease course, healthy diet and increased need for exercise.  Discussed the risk of cardiovascular disease, increase stroke and heart attack risk.    Patient voiced understanding and understood the treatment plan. All questions were answered.              Relevant Medications    glimepiride (AMARYL) 4 MG tablet    metFORMIN (GLUCOPHAGE-XR) 500 MG ER 24hr tablet    Type 2 diabetes mellitus with hyperglycemia, without long-term current use of insulin (Chronic)     A1c is 8.0.  Patient has started dieting and exercising again.  Recheck A1c in three months.  Continue current medication regimen.  Monitor hemoglobin A1c.  Discussed diabetic diet and exercise protocol.  Continue medications.  Monitor for side effects.  Discussed checking blood glucose.  Discussed symptoms to monitor for and to notify me immediately if persistent or worsening.  Follow up with Ophthalmology/Optometry and Podiatry.           Relevant Medications    glimepiride (AMARYL) 4 MG tablet    lisinopriL (PRINIVIL,ZESTRIL) 5 MG tablet    metFORMIN (GLUCOPHAGE-XR) 500 MG ER 24hr tablet    Radiculopathy, lumbar region (Chronic)     Refill gabapentin,. follow-up pain management.         Relevant Medications     gabapentin (NEURONTIN) 300 MG capsule    Encounter for long-term (current) use of medications (Chronic)     Update lab orders for annual wellness.Complete history and physical was completed today.  Complete and thorough medication reconciliation was performed.  Discussed risks and benefits of medications.  Advised patient on orders and health maintenance.  We discussed old records and old labs if available.  Will request any records not available through epic.  Continue current medications listed on your summary sheet.           Relevant Medications    atorvastatin (LIPITOR) 40 MG tablet    hydroCHLOROthiazide (HYDRODIURIL) 25 MG tablet    glimepiride (AMARYL) 4 MG tablet    gabapentin (NEURONTIN) 300 MG capsule    lisinopriL (PRINIVIL,ZESTRIL) 5 MG tablet    metFORMIN (GLUCOPHAGE-XR) 500 MG ER 24hr tablet    Other Relevant Orders    CBC Without Differential    TSH    Comprehensive metabolic panel    Hemoglobin A1C    Lipid Panel    Left wrist pain (Chronic)     Check MRI.  Rule out occult fracture.  Patient advised to avoid heavy lifting and use of the left wrist.Discussed condition course and signs and symptoms to expect.  Patient advised take anti-inflammatories and or Tylenol for pain. ER precautions.  Call MD or follow-up to clinic if not improving or worsening symptoms.           Relevant Medications    meloxicam (MOBIC) 15 MG tablet    Other Relevant Orders    MRI Wrist Joint Without Contrast Left    Uncontrolled type 2 diabetes mellitus with peripheral neuropathy     Continue gabapentin.  Discussed risk and benefits of medication use.         Relevant Medications    glimepiride (AMARYL) 4 MG tablet    metFORMIN (GLUCOPHAGE-XR) 500 MG ER 24hr tablet      Other Visit Diagnoses     Encounter for general adult medical examination with abnormal findings    -  Primary    Relevant Orders    CBC Without Differential    TSH    Comprehensive metabolic panel    Hemoglobin A1C    Lipid Panel    Hyperlipidemia,  unspecified hyperlipidemia type        Relevant Medications    atorvastatin (LIPITOR) 40 MG tablet    Uncontrolled type 2 diabetes mellitus without complication, without long-term current use of insulin  (Chronic)       Relevant Medications    glimepiride (AMARYL) 4 MG tablet    lisinopriL (PRINIVIL,ZESTRIL) 5 MG tablet    metFORMIN (GLUCOPHAGE-XR) 500 MG ER 24hr tablet    Encounter for lipid screening for cardiovascular disease        Relevant Orders    Lipid Panel    Encounter for prostate cancer screening        Relevant Orders    PSA, Screening        Future Appointments     Date Provider Specialty Appt Notes    11/5/2020  Lab 3 month A1C labs    12/9/2020  Lab 3 month A1C labs    12/18/2020 Charles Nash MD Family Medicine 3 month F/U DM and review labs    3/9/2021  Lab 6 month Annual Labs           Medication List with Changes/Refills   New Medications    MELOXICAM (MOBIC) 15 MG TABLET    Take 1 tablet (15 mg total) by mouth once daily.   Current Medications    ASPIRIN (ECOTRIN) 81 MG EC TABLET    Take 1 tablet (81 mg total) by mouth once daily.    AZELASTINE (ASTELIN) 137 MCG (0.1 %) NASAL SPRAY    1 spray (137 mcg total) by Nasal route 2 (two) times daily. for 7 days    DICLOFENAC SODIUM (VOLTAREN) 1 % GEL        FLUTICASONE PROPIONATE (FLONASE) 50 MCG/ACTUATION NASAL SPRAY    1 spray (50 mcg total) by Each Nostril route 2 (two) times daily as needed.    MECLIZINE (ANTIVERT) 12.5 MG TABLET    Take 2 tablets (25 mg total) by mouth 3 (three) times daily as needed for Dizziness.    MV-MIN-FOLIC ACID-LUTEIN (ADULT MULTIVITAMIN, W-LUTEIN,) 200-137.5 MCG CHEW    Adult Multivitamin (w-lutein)   1 tablet by mouth daily   Changed and/or Refilled Medications    Modified Medication Previous Medication    ATORVASTATIN (LIPITOR) 40 MG TABLET atorvastatin (LIPITOR) 40 MG tablet       Take 1 tablet (40 mg total) by mouth once daily.    Take 1 tablet by mouth once daily    GABAPENTIN (NEURONTIN) 300 MG CAPSULE  gabapentin (NEURONTIN) 300 MG capsule       Take 1 capsule (300 mg total) by mouth 3 (three) times daily.    Take 1 capsule (300 mg total) by mouth 3 (three) times daily.    GLIMEPIRIDE (AMARYL) 4 MG TABLET glimepiride (AMARYL) 4 MG tablet       Take 2 tablets (8 mg total) by mouth daily with breakfast.    Take 2 tablets (8 mg total) by mouth daily with breakfast.    HYDROCHLOROTHIAZIDE (HYDRODIURIL) 25 MG TABLET hydroCHLOROthiazide (HYDRODIURIL) 12.5 MG Tab       Take 1 tablet (25 mg total) by mouth once daily.    Take 1 tablet by mouth once daily    LISINOPRIL (PRINIVIL,ZESTRIL) 5 MG TABLET lisinopril (PRINIVIL,ZESTRIL) 5 MG tablet       Take 1 tablet (5 mg total) by mouth once daily.    Take 1 tablet (5 mg total) by mouth once daily.    METFORMIN (GLUCOPHAGE-XR) 500 MG ER 24HR TABLET metFORMIN (GLUCOPHAGE-XR) 500 MG ER 24hr tablet       Take 2 tablets (1,000 mg total) by mouth 2 (two) times daily with meals.    TAKE 2 TABLETS BY MOUTH TWICE DAILY WITH MEALS   Discontinued Medications    BENZONATATE (TESSALON) 200 MG CAPSULE        METFORMIN (GLUCOPHAGE-XR) 500 MG ER 24HR TABLET    TAKE 2 TABLETS BY MOUTH TWICE DAILY WITH MEALS    PANTOPRAZOLE (PROTONIX) 20 MG TABLET    Take 1 tablet (20 mg total) by mouth once daily.    PEPCID AC 20 MG TABLET    TK 1 T PO BID FOR 7 DAYS    SUCRALFATE (CARAFATE) 1 GRAM TABLET    Take 1 tablet (1 g total) by mouth 4 (four) times daily before meals and nightly.    TADALAFIL (CIALIS) 20 MG TAB    DISSOLVE 1 TO 2 TABLETS BY MOUTH EVERY DAY AS NEEDED FOR sex       Charles Nash M.D.     ==========================================================================  Subjective:      Patient ID: Luis Lozano is a 60 y.o. male.  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     Chief Complaint: Follow-up and Annual Exam      Problem List Items Addressed This Visit     Hypertension associated with diabetes (Chronic)    Overview      ==================================================  SEPTEMBER 2020:  Patient with recent elevations in his blood pressures.  Recently went to ER for chest pain but was given medication for reflux that resolve the pain.  Patient has been doing well since then.  CHRONIC. STABLE. BP Reviewed.  Compliant with BP medications. No SE reported.   (-) CP, SOB, palpitations, dizziness, lightheadedness, HA, arm numbness, tingling or weakness, syncope.  Creatinine   Date Value Ref Range Status   08/23/2020 0.88 0.50 - 1.40 mg/dL Final     =================================================  Chronic.  Stable.  Patient taking hydrochlorothiazide 12.5 mg amlodipine 2.5 mg.  Reports compliance.  No side effects reported.  No symptoms of chest pain shortness of breath blurry vision etc.  =====================================================  November 2019:  Patient reports that he did have episode of vertigo and went to the ER but was given meclizine and Tessalon Perles for the cough.  Has not been having issues since then.  CHRONIC. STABLE. BP Reviewed.  Compliant with BP medications. No SE reported.            Current Assessment & Plan     Increase hydrochlorothiazide 25 mg.  Patient will report back to me if blood pressure is greater than 140/90.Discussed hypertension disease course, DASH-diet and exercise.  Discussed medication regimen importance of treating high blood pressure.  Patient advised of risk of untreated blood pressure.    ER precautions were given for symptoms of hypertensive urgency and emergency.           Hyperlipidemia associated with type 2 diabetes mellitus (Chronic)    Overview     Initial HPI:  Chronic.  Control is uncertain.  Due for labs.  On atorvastatin 20 mg.  Reports compliance.  No side effects reported.  =======================================================  November 2019:  Patient has been on increased dose of atorvastatin 40 mg and is not having any side effects currently.  September 2020:   Compliant with medications.  Patient is due for fasting lipid panel.  CHRONIC. STABLE. Lab analysis reviewed.   (-) CP, SOB, abdominal pain, N/V/D, constipation, jaundice, skin changes.  (-) Myalgias  Lab Results   Component Value Date    CHOL 160 11/22/2019    CHOL 185 08/05/2019    CHOL 225 (H) 06/03/2017     Lab Results   Component Value Date    HDL 53 11/22/2019    HDL 57 08/05/2019    HDL 52 03/08/2018     Lab Results   Component Value Date    LDLCALC 89.0 11/22/2019    LDLCALC 111.4 08/05/2019    LDLCALC 93 03/08/2018     Lab Results   Component Value Date    TRIG 90 11/22/2019    TRIG 83 08/05/2019    TRIG 72 03/08/2018     Lab Results   Component Value Date    CHOLHDL 33.1 11/22/2019    CHOLHDL 30.8 08/05/2019    CHOLHDL 24.9 06/03/2017     Lab Results   Component Value Date    TOTALCHOLEST 3.0 11/22/2019    TOTALCHOLEST 3.2 08/05/2019    TOTALCHOLEST 4.0 06/03/2017     Lab Results   Component Value Date    ALT 24 08/23/2020    AST 33 08/23/2020    ALKPHOS 51 08/23/2020    BILITOT 1.0 08/23/2020     ======================================================         Current Assessment & Plan     Discussed hyperlipidemia disease course, healthy diet and increased need for exercise.  Discussed the risk of cardiovascular disease, increase stroke and heart attack risk.    Patient voiced understanding and understood the treatment plan. All questions were answered.              Type 2 diabetes mellitus with hyperglycemia, without long-term current use of insulin (Chronic)    Overview     =====================================================  November 2019:  ReviewedDiabetes Management Status  Statin: TakingACE/ARB: Not taking  =====================================================MARCH 2020:  Reviewed Diabetes Management Status  Statin: TakingACE/ARB: Taking  ==================================================  SEPTEMBER 2020:  Reviewed Diabetes Management Status Statin: TakingACE/ARB: Taking    Screening or Prevention  Patient's value Goal Complete/Controlled?   HgA1C Testing and Control   Lab Results   Component Value Date    HGBA1C 8.0 (H) 08/05/2020      Annually/Less than 8% No   Lipid profile : 11/22/2019 Annually Yes   LDL control Lab Results   Component Value Date    LDLCALC 89.0 11/22/2019    Annually/Less than 100 mg/dl  Yes   Nephropathy screening Lab Results   Component Value Date    LABMICR 17.0 11/22/2019     Lab Results   Component Value Date    PROTEINUA Negative 11/13/2019    Annually Yes   Blood pressure BP Readings from Last 1 Encounters:   09/09/20 138/89    Less than 140/90 Yes   Dilated retinal exam : 07/07/2020 Annually Yes   Foot exam   : 11/27/2019 Annually Yes       =================================================         Current Assessment & Plan     A1c is 8.0.  Patient has started dieting and exercising again.  Recheck A1c in three months.  Continue current medication regimen.  Monitor hemoglobin A1c.  Discussed diabetic diet and exercise protocol.  Continue medications.  Monitor for side effects.  Discussed checking blood glucose.  Discussed symptoms to monitor for and to notify me immediately if persistent or worsening.  Follow up with Ophthalmology/Optometry and Podiatry.           Radiculopathy, lumbar region (Chronic)    Overview     Chronic.  Stable.  Patient sees pain management.  Taking gabapentin 300 mg per medication list.  Needs refill.  Reports compliance.  No side effects reported.         Current Assessment & Plan     Refill gabapentin,. follow-up pain management.         Encounter for long-term (current) use of medications (Chronic)    Overview     08/06/2019 : CHRONIC long-term drug therapy for managed conditions. See medication list. Reports compliance.  No side effects reported.  Routine lab work is being monitored.  Patient does  need refills today.   ============================================  November 2019:  CHRONIC. Stable. Compliant with medications for managed conditions. See  medication list. No SE reported.   Routine lab analysis is being monitored. Refills were addressed.  ==================================================  SEPTEMBER 2020:  Reviewed labs.  CHRONIC. Stable. Compliant with medications for managed conditions. See medication list. No SE reported.   Routine lab analysis is being monitored. Refills were addressed.  Lab Results   Component Value Date    WBC 7.18 08/23/2020    HGB 16.5 08/23/2020    HCT 51.0 08/23/2020    MCV 90 08/23/2020     08/23/2020         Chemistry        Component Value Date/Time     08/23/2020 0713    K 3.9 08/23/2020 0713     08/23/2020 0713    CO2 31 08/23/2020 0713    BUN 22 (H) 08/23/2020 0713    CREATININE 0.88 08/23/2020 0713     (H) 08/23/2020 0713        Component Value Date/Time    CALCIUM 9.2 08/23/2020 0713    ALKPHOS 51 08/23/2020 0713    AST 33 08/23/2020 0713    ALT 24 08/23/2020 0713    BILITOT 1.0 08/23/2020 0713    ESTGFRAFRICA >60 08/23/2020 0713    EGFRNONAA >60 08/23/2020 0713          Lab Results   Component Value Date    TSH 0.796 08/05/2019    D0OQHMS 7.2 08/05/2019    T3FREE 2.6 08/05/2019       =================================================         Current Assessment & Plan     Update lab orders for annual wellness.Complete history and physical was completed today.  Complete and thorough medication reconciliation was performed.  Discussed risks and benefits of medications.  Advised patient on orders and health maintenance.  We discussed old records and old labs if available.  Will request any records not available through epic.  Continue current medications listed on your summary sheet.           Left wrist pain (Chronic)    Overview     Subacute.  Started a couple of weeks ago when patient had a type fall on to his left wrist.  Patient reports that it did not hurt right away and did not swell.  After few days it started to ache and is still hurting to this day.  Patient has been taking ibuprofen  and had x-ray at an orthopedic office but I do not have the records.  Patient was told that the x-ray was negative.  Denies any weakness of the hand.         Current Assessment & Plan     Check MRI.  Rule out occult fracture.  Patient advised to avoid heavy lifting and use of the left wrist.Discussed condition course and signs and symptoms to expect.  Patient advised take anti-inflammatories and or Tylenol for pain. ER precautions.  Call MD or follow-up to clinic if not improving or worsening symptoms.           Uncontrolled type 2 diabetes mellitus with peripheral neuropathy    Overview     September 2020 update:  Patient is compliant with gabapentin 300 3 times per day.  Previous plan:  Chronic.  Control is uncertain.  Due for hemoglobin A1c.  Patient is seeing eye doctor and foot doctor.  Patient is taking glimepiride Tradjenta and metformin extended release 500 mg.  Patient has not started his Trulicity yet.  Patient does check his blood sugar.  He is eligible for the [employer] diabetes program is interested.         Current Assessment & Plan     Continue gabapentin.  Discussed risk and benefits of medication use.           Other Visit Diagnoses     Encounter for general adult medical examination with abnormal findings    -  Primary    Hyperlipidemia, unspecified hyperlipidemia type        Uncontrolled type 2 diabetes mellitus without complication, without long-term current use of insulin  (Chronic)       Encounter for lipid screening for cardiovascular disease        Encounter for prostate cancer screening               Past Medical History:  Past Medical History:   Diagnosis Date    Diabetes mellitus, type 2     Hypertension     Spermatocele 9/3/2019     History reviewed. No pertinent surgical history.  Review of patient's allergies indicates:  No Known Allergies  Medication List with Changes/Refills   New Medications    MELOXICAM (MOBIC) 15 MG TABLET    Take 1 tablet (15 mg total) by mouth once  daily.   Current Medications    ASPIRIN (ECOTRIN) 81 MG EC TABLET    Take 1 tablet (81 mg total) by mouth once daily.    AZELASTINE (ASTELIN) 137 MCG (0.1 %) NASAL SPRAY    1 spray (137 mcg total) by Nasal route 2 (two) times daily. for 7 days    DICLOFENAC SODIUM (VOLTAREN) 1 % GEL        FLUTICASONE PROPIONATE (FLONASE) 50 MCG/ACTUATION NASAL SPRAY    1 spray (50 mcg total) by Each Nostril route 2 (two) times daily as needed.    MECLIZINE (ANTIVERT) 12.5 MG TABLET    Take 2 tablets (25 mg total) by mouth 3 (three) times daily as needed for Dizziness.    MV-MIN-FOLIC ACID-LUTEIN (ADULT MULTIVITAMIN, W-LUTEIN,) 200-137.5 MCG CHEW    Adult Multivitamin (w-lutein)   1 tablet by mouth daily   Changed and/or Refilled Medications    Modified Medication Previous Medication    ATORVASTATIN (LIPITOR) 40 MG TABLET atorvastatin (LIPITOR) 40 MG tablet       Take 1 tablet (40 mg total) by mouth once daily.    Take 1 tablet by mouth once daily    GABAPENTIN (NEURONTIN) 300 MG CAPSULE gabapentin (NEURONTIN) 300 MG capsule       Take 1 capsule (300 mg total) by mouth 3 (three) times daily.    Take 1 capsule (300 mg total) by mouth 3 (three) times daily.    GLIMEPIRIDE (AMARYL) 4 MG TABLET glimepiride (AMARYL) 4 MG tablet       Take 2 tablets (8 mg total) by mouth daily with breakfast.    Take 2 tablets (8 mg total) by mouth daily with breakfast.    HYDROCHLOROTHIAZIDE (HYDRODIURIL) 25 MG TABLET hydroCHLOROthiazide (HYDRODIURIL) 12.5 MG Tab       Take 1 tablet (25 mg total) by mouth once daily.    Take 1 tablet by mouth once daily    LISINOPRIL (PRINIVIL,ZESTRIL) 5 MG TABLET lisinopril (PRINIVIL,ZESTRIL) 5 MG tablet       Take 1 tablet (5 mg total) by mouth once daily.    Take 1 tablet (5 mg total) by mouth once daily.    METFORMIN (GLUCOPHAGE-XR) 500 MG ER 24HR TABLET metFORMIN (GLUCOPHAGE-XR) 500 MG ER 24hr tablet       Take 2 tablets (1,000 mg total) by mouth 2 (two) times daily with meals.    TAKE 2 TABLETS BY MOUTH TWICE DAILY  "WITH MEALS   Discontinued Medications    BENZONATATE (TESSALON) 200 MG CAPSULE        METFORMIN (GLUCOPHAGE-XR) 500 MG ER 24HR TABLET    TAKE 2 TABLETS BY MOUTH TWICE DAILY WITH MEALS    PANTOPRAZOLE (PROTONIX) 20 MG TABLET    Take 1 tablet (20 mg total) by mouth once daily.    PEPCID AC 20 MG TABLET    TK 1 T PO BID FOR 7 DAYS    SUCRALFATE (CARAFATE) 1 GRAM TABLET    Take 1 tablet (1 g total) by mouth 4 (four) times daily before meals and nightly.    TADALAFIL (CIALIS) 20 MG TAB    DISSOLVE 1 TO 2 TABLETS BY MOUTH EVERY DAY AS NEEDED FOR sex      Social History     Tobacco Use    Smoking status: Never Smoker    Smokeless tobacco: Never Used   Substance Use Topics    Alcohol use: No     Frequency: Never     Drinks per session: 1 or 2     Binge frequency: Never      Family History   Problem Relation Age of Onset    Diabetes Mother             Heart disease Mother 63    Lung cancer Father     Hypertension Maternal Grandmother        I have reviewed the complete PMH, social history, surgical history, allergies and medications.  As well as family history.    Review of Systems   Constitutional: Negative for activity change and fatigue.   HENT: Negative for congestion and sinus pain.    Eyes: Negative for visual disturbance.   Respiratory: Negative for chest tightness and shortness of breath.    Cardiovascular: Negative for palpitations and leg swelling.   Gastrointestinal: Negative for abdominal pain, diarrhea and nausea.   Endocrine: Negative for polyuria.   Genitourinary: Negative for difficulty urinating and frequency.   Musculoskeletal: Positive for arthralgias. Negative for joint swelling.        Left wrist pain and swelling   Skin: Negative for rash.   Neurological: Negative for dizziness and headaches.   Psychiatric/Behavioral: Negative for agitation. The patient is not nervous/anxious.      Objective:   /89   Pulse 70   Temp 97.9 °F (36.6 °C) (Temporal)   Ht 5' 9" (1.753 m)   Wt 95.8 kg " (211 lb 3.2 oz)   BMI 31.19 kg/m²   Physical Exam  Vitals signs and nursing note reviewed.   Constitutional:       General: He is not in acute distress.     Appearance: He is well-developed.   HENT:      Head: Normocephalic and atraumatic.   Eyes:      Pupils: Pupils are equal, round, and reactive to light.   Neck:      Musculoskeletal: Normal range of motion and neck supple.   Cardiovascular:      Rate and Rhythm: Normal rate and regular rhythm.      Pulses:           Dorsalis pedis pulses are 2+ on the right side and 2+ on the left side.        Posterior tibial pulses are 2+ on the right side and 2+ on the left side.      Heart sounds: Normal heart sounds. No murmur.   Pulmonary:      Effort: Pulmonary effort is normal. No respiratory distress.      Breath sounds: Normal breath sounds. No wheezing.   Musculoskeletal:         General: Tenderness present.      Left wrist: He exhibits tenderness and bony tenderness. He exhibits normal range of motion, no swelling, no effusion, no crepitus, no deformity and no laceration.      Right foot: Normal range of motion. No deformity.      Left foot: Normal range of motion. No deformity.   Feet:      Right foot:      Protective Sensation: 10 sites tested. 7 sites sensed.      Skin integrity: Callus and dry skin present. No ulcer, blister, skin breakdown, erythema or warmth.      Left foot:      Protective Sensation: 10 sites tested. 7 sites sensed.      Skin integrity: Callus and dry skin present. No ulcer, blister, skin breakdown, erythema or warmth.   Skin:     General: Skin is warm and dry.      Capillary Refill: Capillary refill takes less than 2 seconds.   Neurological:      Mental Status: He is alert and oriented to person, place, and time.      Cranial Nerves: No cranial nerve deficit.   Psychiatric:         Behavior: Behavior normal.         Assessment:     1. Encounter for general adult medical examination with abnormal findings    2. Encounter for long-term  (current) use of medications    3. Hyperlipidemia, unspecified hyperlipidemia type    4. Uncontrolled type 2 diabetes mellitus without complication, without long-term current use of insulin    5. Radiculopathy, lumbar region    6. Encounter for lipid screening for cardiovascular disease    7. Hypertension associated with diabetes    8. Left wrist pain    9. Encounter for prostate cancer screening    10. Hyperlipidemia associated with type 2 diabetes mellitus    11. Type 2 diabetes mellitus with hyperglycemia, without long-term current use of insulin    12. Uncontrolled type 2 diabetes mellitus with peripheral neuropathy      MDM:   Moderate complexity.  Moderate risk.  Patient is here for annual wellness facet as well.  See other note.  I have Reviewed and summarized old records.  I have performed thorough medication reconciliation today and discussed risk and benefits of each medication.  I have reviewed labs and discussed with patient.  All questions were answered.  I am requesting old records and will review them once they are available.    I have signed for the following orders AND/OR meds.  Orders Placed This Encounter   Procedures    MRI Wrist Joint Without Contrast Left     Standing Status:   Future     Number of Occurrences:   1     Standing Expiration Date:   9/9/2021     Order Specific Question:   Does the patient have a pacemaker or a defibrilator?     Answer:   No     Order Specific Question:   Does the patient have a cerebral aneurysm or surgical clip, pump, nerve or brain stimulator, middle or inner ear prosthesis, or other metal implant or  been injured by a metal object(i.e. bullet, bb, shrapnel)?     Answer:   No     Order Specific Question:   Is the patient claustrophobic?     Answer:   No     Order Specific Question:   Will the patient require sedation?     Answer:   No     Order Specific Question:   Does the patient have any of the following conditions? Diabetes, History of Renal Disease or  Hypertension requiring medical therapy?     Answer:   Yes     Order Specific Question:   May the Radiologist modify the order per protocol to meet the clinical needs of the patient?     Answer:   Yes     Order Specific Question:   Is this part of a Research Study?     Answer:   No     Order Specific Question:   Does the patient have on a skin patch for medication with aluminized backing?     Answer:   No    CBC Without Differential     Standing Status:   Standing     Number of Occurrences:   99     Standing Expiration Date:   11/8/2021    TSH     Standing Status:   Standing     Number of Occurrences:   99     Standing Expiration Date:   11/8/2021    Comprehensive metabolic panel     Standing Status:   Standing     Number of Occurrences:   99     Standing Expiration Date:   9/4/2040    Hemoglobin A1C     Standing Status:   Standing     Number of Occurrences:   99     Standing Expiration Date:   9/4/2040    Lipid Panel     Standing Status:   Standing     Number of Occurrences:   99     Standing Expiration Date:   9/4/2040    PSA, Screening     Standing Status:   Future     Standing Expiration Date:   11/8/2021     Medications Ordered This Encounter   Medications    atorvastatin (LIPITOR) 40 MG tablet     Sig: Take 1 tablet (40 mg total) by mouth once daily.     Dispense:  90 tablet     Refill:  3    gabapentin (NEURONTIN) 300 MG capsule     Sig: Take 1 capsule (300 mg total) by mouth 3 (three) times daily.     Dispense:  270 capsule     Refill:  3    glimepiride (AMARYL) 4 MG tablet     Sig: Take 2 tablets (8 mg total) by mouth daily with breakfast.     Dispense:  180 tablet     Refill:  3    hydroCHLOROthiazide (HYDRODIURIL) 25 MG tablet     Sig: Take 1 tablet (25 mg total) by mouth once daily.     Dispense:  90 tablet     Refill:  4     .    lisinopriL (PRINIVIL,ZESTRIL) 5 MG tablet     Sig: Take 1 tablet (5 mg total) by mouth once daily.     Dispense:  90 tablet     Refill:  3     .    meloxicam (MOBIC)  15 MG tablet     Sig: Take 1 tablet (15 mg total) by mouth once daily.     Dispense:  30 tablet     Refill:  0    metFORMIN (GLUCOPHAGE-XR) 500 MG ER 24hr tablet     Sig: Take 2 tablets (1,000 mg total) by mouth 2 (two) times daily with meals.     Dispense:  360 tablet     Refill:  3        Follow up in about 3 months (around 12/9/2020), or if symptoms worsen or fail to improve, for DM, LAB RESULTS.    If no improvement in symptoms or symptoms worsen, advised to call/follow-up at clinic or go to ER. Patient voiced understanding and all questions/concerns were addressed.     DISCLAIMER: This note was compiled by using a speech recognition dictation system and therefore please be aware that typographical / speech recognition errors can and do occur.  Please contact me if you see any errors specifically.    Charles Nash M.D.       Office: [phone]   [address] [place] [address]  FAX: [phone]

## 2020-09-10 NOTE — TELEPHONE ENCOUNTER
----- Message from Charles Nash MD sent at 9/9/2020  1:30 PM CDT -----  Set up one year annual wellness visit.

## 2020-09-11 ENCOUNTER — TELEPHONE (OUTPATIENT)
Dept: FAMILY MEDICINE | Facility: CLINIC | Age: 60
End: 2020-09-11

## 2020-09-11 NOTE — TELEPHONE ENCOUNTER
----- Message from Nick Stone sent at 9/11/2020 10:42 AM CDT -----  Contact: [facility]  Mallika from [facility] would to the MRI referral copied and sent over to schedule appt for pt at their facility. Please fax [phone] to and call back at [phone].        Thanks  Zs

## 2020-09-11 NOTE — TELEPHONE ENCOUNTER
Returned call to Mallika at [facility] states that they are needing a authorization from patient's insurance to be able to perform the Mri.

## 2020-09-16 ENCOUNTER — TELEPHONE (OUTPATIENT)
Dept: FAMILY MEDICINE | Facility: CLINIC | Age: 60
End: 2020-09-16

## 2020-09-16 NOTE — TELEPHONE ENCOUNTER
Can you please work referral [id number]?  [facility] called today asking if this has been authorized so they can get the patient scheduled.    Thanks in advance for you time and assistance with this matter,    Niesha

## 2020-09-17 ENCOUNTER — TELEPHONE (OUTPATIENT)
Dept: FAMILY MEDICINE | Facility: CLINIC | Age: 60
End: 2020-09-17

## 2020-09-17 NOTE — TELEPHONE ENCOUNTER
Spoke with Mallika at [facility] and gave her the number for the Pre Service Department to check the status of the pre cert for mr. Lozano MRI.

## 2020-09-17 NOTE — TELEPHONE ENCOUNTER
----- Message from Ana Chaves sent at 9/17/2020  2:23 PM CDT -----  Regarding: pt advice  Contact: Pt  Pt is calling to speak to nurse regarding the approval of MRI. Please call back at [phone]//thank you acc

## 2020-09-17 NOTE — TELEPHONE ENCOUNTER
----- Message from Yakov Monroy sent at 9/17/2020 11:31 AM CDT -----  Contact: [facility]  Requesting call back regarding getting status of auth for precert for pt mri. Please call back at [phone].

## 2020-09-17 NOTE — TELEPHONE ENCOUNTER
Spoke with patient to let him know that I sent a message to the Pre Service department to expedite his authorization, I also let him know that I had given the contact information to Mallika at [facility].

## 2020-09-25 ENCOUNTER — PATIENT MESSAGE (OUTPATIENT)
Dept: OTHER | Facility: OTHER | Age: 60
End: 2020-09-25

## 2020-10-14 ENCOUNTER — PATIENT MESSAGE (OUTPATIENT)
Dept: FAMILY MEDICINE | Facility: CLINIC | Age: 60
End: 2020-10-14

## 2020-10-14 NOTE — TELEPHONE ENCOUNTER
Please have the patient try Mucinex and the nasal spray.  He can also try other over-the-counter medications for his symptoms.    Also please let the patient know that we do have the option to do virtual visit if he ever gets sick again to avoid going to the emergency department.    Keep us updated on the COVID status.  Make sure patient has follow-up appointment.

## 2020-11-03 ENCOUNTER — TELEPHONE (OUTPATIENT)
Dept: FAMILY MEDICINE | Facility: CLINIC | Age: 60
End: 2020-11-03

## 2020-11-03 NOTE — TELEPHONE ENCOUNTER
----- Message from Ashlee Kelly sent at 11/3/2020 10:03 AM CST -----  .Type:  Patient Returning Call    Who Called:TRENTON WILLIAMSON [[id number]]  Who Left Message for Patient:  nurse   Does the patient know what this is regarding?:  Would the patient rather a call back or a response via [facility]?   Both    Best Call Back Number:  [phone]  Additional Information:

## 2020-11-03 NOTE — TELEPHONE ENCOUNTER
Spoke with patient and discussed ER visit related to lightheadedness and dizziness.  Patient states that he was feeling much better now, the dizziness is intermittent.

## 2020-11-20 ENCOUNTER — PATIENT MESSAGE (OUTPATIENT)
Dept: FAMILY MEDICINE | Facility: CLINIC | Age: 60
End: 2020-11-20

## 2020-11-20 DIAGNOSIS — R09.89 PHLEGM IN THROAT: Primary | ICD-10-CM

## 2020-11-20 RX ORDER — DEXTROMETHORPHAN HYDROBROMIDE, GUAIFENESIN, PHENYLEPHRINE HYDROCHLORIDE 17.5; 400; 1 MG/1; MG/1; MG/1
1 TABLET ORAL EVERY 6 HOURS PRN
Qty: 30 TABLET | Refills: 0 | Status: SHIPPED | OUTPATIENT
Start: 2020-11-20 | End: 2020-11-30

## 2020-11-20 NOTE — TELEPHONE ENCOUNTER
I received your message which was reviewed along with the the medication list and allergies that we have below.  Please review it for accuracy to make sure that we have the most recent records on your history.     Based on this, the following orders were placed AND/OR medicines were sent in.     No orders of the defined types were placed in this encounter.      Medications written and sent at this time include:  Medications Ordered This Encounter   Medications    phenylephrine-DM-guaifenesin (DECONEX DMX) 10-17.5-400 mg Tab     Sig: Take 1 tablet by mouth every 6 (six) hours as needed.     Dispense:  30 tablet     Refill:  0       Your pharmacy(ies) of choice at this time on record include the list below and any medications would have been sent to the one at the top.    [facility] [address] [place] [address]  Phone: [phone] Fax: [phone]      Thank you for choosing us as your healthcare provider!  Dr. Charles Nash    ALLERGY LIST  Review of patient's allergies indicates:  No Known Allergies    MEDICATION LIST  Current Outpatient Medications on File Prior to Visit   Medication Sig Dispense Refill    aspirin (ECOTRIN) 81 MG EC tablet Take 1 tablet (81 mg total) by mouth once daily.      atorvastatin (LIPITOR) 40 MG tablet Take 1 tablet (40 mg total) by mouth once daily. 90 tablet 3    azelastine (ASTELIN) 137 mcg (0.1 %) nasal spray 1 spray (137 mcg total) by Nasal route 2 (two) times daily. for 7 days 30 mL 0    diclofenac sodium (VOLTAREN) 1 % Gel       fluticasone propionate (FLONASE) 50 mcg/actuation nasal spray 1 spray (50 mcg total) by Each Nostril route 2 (two) times daily as needed. 15 g 0    gabapentin (NEURONTIN) 300 MG capsule Take 1 capsule (300 mg total) by mouth 3 (three) times daily. 270 capsule 3    glimepiride (AMARYL) 4 MG tablet Take 2 tablets (8 mg total) by mouth daily with breakfast. 180 tablet 3    hydroCHLOROthiazide  (HYDRODIURIL) 25 MG tablet Take 1 tablet (25 mg total) by mouth once daily. 90 tablet 4    lisinopriL (PRINIVIL,ZESTRIL) 5 MG tablet Take 1 tablet (5 mg total) by mouth once daily. 90 tablet 3    meclizine (ANTIVERT) 12.5 mg tablet Take 2 tablets (25 mg total) by mouth 3 (three) times daily as needed for Dizziness. 30 tablet 5    metFORMIN (GLUCOPHAGE-XR) 500 MG ER 24hr tablet Take 2 tablets (1,000 mg total) by mouth 2 (two) times daily with meals. 360 tablet 3    mv-min-folic acid-lutein (ADULT MULTIVITAMIN, W-LUTEIN,) 200-137.5 mcg Chew Adult Multivitamin (w-lutein)   1 tablet by mouth daily       No current facility-administered medications on file prior to visit.        HEALTH MAINTENANCE THAT IS OVERDUE OR NEEDS TO BE UPDATED ON OUR CHART IS LISTED BELOW.  IF YOU HAVE HAD IT DONE ELSEWHERE, PLEASE SEND US DATES AND RECORDS IF YOU HAVE THEM TO MAKE YOUR CHART ACCURATE.  IF YOU HAVE NOT HAD THESE DONE AND ARE READY FOR US TO SCHEDULE THEM, PLEASE SEND US A MESSAGE.  Health Maintenance Due   Topic Date Due    TETANUS VACCINE  05/06/1976    Shingles Vaccine (1 of 2) 05/06/2008    PROSTATE-SPECIFIC ANTIGEN  06/03/2018    Influenza Vaccine (1) 08/01/2020    Hemoglobin A1c  11/05/2020       DISCLAIMER: This note was compiled by using a speech recognition dictation system and therefore please be aware that typographical / speech recognition errors can and do occur.  Please contact me if you see any errors specifically.    Charles Nash MD  We Offer Telehealth & Same Day Appointments!   Book your Telehealth appointment with me through my nurse or   Clinic appointments on [employer]!  [phone]     Check out my Facebook Page and Follow Me at: CLICK HERE    Check out my website at for; to (do) Centers by clicking on: CLICK HERE    To Schedule appointments online, go to [employer]: CLICK HERE     Location: [email or web address]    [address] [place] [address]    FAX: [phone]

## 2020-12-09 ENCOUNTER — LAB VISIT (OUTPATIENT)
Dept: LAB | Facility: HOSPITAL | Age: 60
End: 2020-12-09
Attending: FAMILY MEDICINE
Payer: COMMERCIAL

## 2020-12-09 ENCOUNTER — PATIENT MESSAGE (OUTPATIENT)
Dept: FAMILY MEDICINE | Facility: CLINIC | Age: 60
End: 2020-12-09

## 2020-12-09 DIAGNOSIS — Z79.899 ENCOUNTER FOR LONG-TERM (CURRENT) USE OF MEDICATIONS: ICD-10-CM

## 2020-12-09 DIAGNOSIS — Z00.01 ENCOUNTER FOR GENERAL ADULT MEDICAL EXAMINATION WITH ABNORMAL FINDINGS: ICD-10-CM

## 2020-12-09 PROCEDURE — 36415 COLL VENOUS BLD VENIPUNCTURE: CPT | Mod: PO

## 2020-12-09 PROCEDURE — 83036 HEMOGLOBIN GLYCOSYLATED A1C: CPT

## 2020-12-10 LAB
ESTIMATED AVG GLUCOSE: 217 MG/DL (ref 68–131)
HBA1C MFR BLD HPLC: 9.2 % (ref 4–5.6)

## 2020-12-10 RX ORDER — TAMSULOSIN HYDROCHLORIDE 0.4 MG/1
1 CAPSULE ORAL NIGHTLY
COMMUNITY
Start: 2020-11-12 | End: 2023-04-05

## 2020-12-10 RX ORDER — SILDENAFIL 100 MG/1
100 TABLET, FILM COATED ORAL
COMMUNITY
Start: 2020-11-12 | End: 2020-12-12

## 2020-12-10 NOTE — PROGRESS NOTES
Please CALL patient with results and Document verification.   [phone]  A1c is significantly worse from previous.  Now up to 9.2.  Medication changes needed.  Please decrease carbohydrates in diet and follow-up closely in one week for diabetes management.    ER precautions.

## 2020-12-15 ENCOUNTER — PATIENT MESSAGE (OUTPATIENT)
Dept: FAMILY MEDICINE | Facility: CLINIC | Age: 60
End: 2020-12-15

## 2020-12-16 ENCOUNTER — PATIENT MESSAGE (OUTPATIENT)
Dept: FAMILY MEDICINE | Facility: CLINIC | Age: 60
End: 2020-12-16

## 2020-12-18 ENCOUNTER — OFFICE VISIT (OUTPATIENT)
Dept: FAMILY MEDICINE | Facility: CLINIC | Age: 60
End: 2020-12-18
Payer: COMMERCIAL

## 2020-12-18 VITALS
WEIGHT: 211.38 LBS | TEMPERATURE: 98 F | SYSTOLIC BLOOD PRESSURE: 118 MMHG | DIASTOLIC BLOOD PRESSURE: 68 MMHG | HEART RATE: 86 BPM | HEIGHT: 69 IN | BODY MASS INDEX: 31.31 KG/M2

## 2020-12-18 DIAGNOSIS — E78.5 HYPERLIPIDEMIA ASSOCIATED WITH TYPE 2 DIABETES MELLITUS: Chronic | ICD-10-CM

## 2020-12-18 DIAGNOSIS — E11.65 TYPE 2 DIABETES MELLITUS WITH HYPERGLYCEMIA, WITHOUT LONG-TERM CURRENT USE OF INSULIN: Primary | Chronic | ICD-10-CM

## 2020-12-18 DIAGNOSIS — E11.69 HYPERLIPIDEMIA ASSOCIATED WITH TYPE 2 DIABETES MELLITUS: Chronic | ICD-10-CM

## 2020-12-18 DIAGNOSIS — Z79.899 ENCOUNTER FOR LONG-TERM (CURRENT) USE OF MEDICATIONS: Chronic | ICD-10-CM

## 2020-12-18 DIAGNOSIS — E11.65 UNCONTROLLED TYPE 2 DIABETES MELLITUS WITH HYPERGLYCEMIA: ICD-10-CM

## 2020-12-18 DIAGNOSIS — I15.2 HYPERTENSION ASSOCIATED WITH DIABETES: Chronic | ICD-10-CM

## 2020-12-18 DIAGNOSIS — E11.59 HYPERTENSION ASSOCIATED WITH DIABETES: Chronic | ICD-10-CM

## 2020-12-18 PROCEDURE — 3008F BODY MASS INDEX DOCD: CPT | Mod: CPTII,S$GLB,, | Performed by: FAMILY MEDICINE

## 2020-12-18 PROCEDURE — 1126F AMNT PAIN NOTED NONE PRSNT: CPT | Mod: S$GLB,,, | Performed by: FAMILY MEDICINE

## 2020-12-18 PROCEDURE — 3074F PR MOST RECENT SYSTOLIC BLOOD PRESSURE < 130 MM HG: ICD-10-PCS | Mod: CPTII,S$GLB,, | Performed by: FAMILY MEDICINE

## 2020-12-18 PROCEDURE — 3078F PR MOST RECENT DIASTOLIC BLOOD PRESSURE < 80 MM HG: ICD-10-PCS | Mod: CPTII,S$GLB,, | Performed by: FAMILY MEDICINE

## 2020-12-18 PROCEDURE — 99999 PR PBB SHADOW E&M-EST. PATIENT-LVL IV: CPT | Mod: PBBFAC,,, | Performed by: FAMILY MEDICINE

## 2020-12-18 PROCEDURE — 1126F PR PAIN SEVERITY QUANTIFIED, NO PAIN PRESENT: ICD-10-PCS | Mod: S$GLB,,, | Performed by: FAMILY MEDICINE

## 2020-12-18 PROCEDURE — 99214 PR OFFICE/OUTPT VISIT, EST, LEVL IV, 30-39 MIN: ICD-10-PCS | Mod: S$GLB,,, | Performed by: FAMILY MEDICINE

## 2020-12-18 PROCEDURE — 3008F PR BODY MASS INDEX (BMI) DOCUMENTED: ICD-10-PCS | Mod: CPTII,S$GLB,, | Performed by: FAMILY MEDICINE

## 2020-12-18 PROCEDURE — 99214 OFFICE O/P EST MOD 30 MIN: CPT | Mod: S$GLB,,, | Performed by: FAMILY MEDICINE

## 2020-12-18 PROCEDURE — 3046F PR MOST RECENT HEMOGLOBIN A1C LEVEL > 9.0%: ICD-10-PCS | Mod: CPTII,S$GLB,, | Performed by: FAMILY MEDICINE

## 2020-12-18 PROCEDURE — 99999 PR PBB SHADOW E&M-EST. PATIENT-LVL IV: ICD-10-PCS | Mod: PBBFAC,,, | Performed by: FAMILY MEDICINE

## 2020-12-18 PROCEDURE — 3046F HEMOGLOBIN A1C LEVEL >9.0%: CPT | Mod: CPTII,S$GLB,, | Performed by: FAMILY MEDICINE

## 2020-12-18 PROCEDURE — 3074F SYST BP LT 130 MM HG: CPT | Mod: CPTII,S$GLB,, | Performed by: FAMILY MEDICINE

## 2020-12-18 PROCEDURE — 3078F DIAST BP <80 MM HG: CPT | Mod: CPTII,S$GLB,, | Performed by: FAMILY MEDICINE

## 2020-12-18 RX ORDER — METFORMIN HYDROCHLORIDE 500 MG/1
1000 TABLET, EXTENDED RELEASE ORAL 2 TIMES DAILY WITH MEALS
Qty: 360 TABLET | Refills: 3 | Status: SHIPPED | OUTPATIENT
Start: 2020-12-18 | End: 2022-06-20 | Stop reason: SDUPTHER

## 2020-12-18 NOTE — PATIENT INSTRUCTIONS
Follow up in about 3 months (around 3/18/2021), or if symptoms worsen or fail to improve, for DM.     If no improvement in symptoms or symptoms worsen, please be advised to call MD, follow-up at clinic and/or go to ER if becomes severe.    Charles Nash M.D.        We Offer TELEHEALTH & Same Day Appointments!   Book your Telehealth appointment with me through my nurse or   Clinic appointments on [employer]!    [address] [place] [address]    Office: [phone]   FAX: [phone]    Check out my Facebook Page and Follow Me at: [email or web address]    Check out my website at [email or web address] by clicking on: [email or web address]    To Schedule appointments online, go to [employer]: [email or web address]

## 2020-12-18 NOTE — PROGRESS NOTES
PLAN:      Problem List Items Addressed This Visit     Hypertension associated with diabetes (Chronic)     Continue blood pressure medication regimen.Counseled on importance of hypertension disease course, I recommend ongoing Education for DASH-diet and exercise.  Counseled on medication regimen importance of treating high blood pressure.  Please be advised of risk of untreated blood pressure as discussed.  Please advised of ER precautions were given for symptoms of hypertensive urgency and emergency.           Relevant Medications    metFORMIN (GLUCOPHAGE-XR) 500 MG ER 24hr tablet    Hyperlipidemia associated with type 2 diabetes mellitus (Chronic)     Continue Lipitor.  Check fasting lipid panel.Counseled on hyperlipidemia disease course, healthy diet and increased need for exercise.  Please be advised of the risk of cardiovascular disease, increase stroke and heart attack risk with uncontrolled/untreated hyperlipidemia.     Patient voiced understanding and understood the treatment plan. All questions were answered.              Relevant Medications    metFORMIN (GLUCOPHAGE-XR) 500 MG ER 24hr tablet    Type 2 diabetes mellitus with hyperglycemia, without long-term current use of insulin - Primary (Chronic)     Increase metformin back to prescribed dose of 1000 mg twice a day recheck A1c in three months.We will plan to monitor hemoglobin A1c at designated intervals 3 to 6 months.  I recommend ongoing Education for diabetic diet and exercise protocol.  We will continue to monitor for side effects.    Please be advised of symptoms to monitor for and to notify me immediately if persistent or worsening.  Follow up with Ophthalmology/Optometry and Podiatry at least annually.           Relevant Medications    metFORMIN (GLUCOPHAGE-XR) 500 MG ER 24hr tablet    Encounter for long-term (current) use of medications (Chronic)     See diabetes.  Reviewed labs.  Continue medications.Complete history and physical was completed  today.  Complete and thorough medication reconciliation was performed.  Discussed risks and benefits of medications.  Advised patient on orders and health maintenance.  We discussed old records and old labs if available.  Will request any records not available through epic.  Continue current medications listed on your summary sheet.           Relevant Medications    metFORMIN (GLUCOPHAGE-XR) 500 MG ER 24hr tablet      Other Visit Diagnoses     Uncontrolled type 2 diabetes mellitus with hyperglycemia        Relevant Medications    metFORMIN (GLUCOPHAGE-XR) 500 MG ER 24hr tablet        Future Appointments     Date Provider Specialty Appt Notes    1/5/2021 Jovani Alba MD Cardiology dizziness/ rapid heart beat    2/4/2021 Theo Gautam MD Gastroenterology acid reflux and food coming up    3/9/2021  Lab 6 month Annual Labs    3/17/2021 Charles Nash MD Family Medicine Annual Wellness           Medication List with Changes/Refills   Current Medications    ASPIRIN (ECOTRIN) 81 MG EC TABLET    Take 1 tablet (81 mg total) by mouth once daily.    ATORVASTATIN (LIPITOR) 40 MG TABLET    Take 1 tablet (40 mg total) by mouth once daily.    AZELASTINE (ASTELIN) 137 MCG (0.1 %) NASAL SPRAY    1 spray (137 mcg total) by Nasal route 2 (two) times daily. for 7 days    DICLOFENAC SODIUM (VOLTAREN) 1 % GEL        FLUTICASONE PROPIONATE (FLONASE) 50 MCG/ACTUATION NASAL SPRAY    1 spray (50 mcg total) by Each Nostril route 2 (two) times daily as needed.    GABAPENTIN (NEURONTIN) 300 MG CAPSULE    Take 1 capsule (300 mg total) by mouth 3 (three) times daily.    GLIMEPIRIDE (AMARYL) 4 MG TABLET    Take 2 tablets (8 mg total) by mouth daily with breakfast.    HYDROCHLOROTHIAZIDE (HYDRODIURIL) 25 MG TABLET    Take 1 tablet (25 mg total) by mouth once daily.    LISINOPRIL (PRINIVIL,ZESTRIL) 5 MG TABLET    Take 1 tablet (5 mg total) by mouth once daily.    MECLIZINE (ANTIVERT) 12.5 MG TABLET    Take 2 tablets (25 mg total) by  mouth 3 (three) times daily as needed for Dizziness.    MV-MIN-FOLIC ACID-LUTEIN (ADULT MULTIVITAMIN, W-LUTEIN,) 200-137.5 MCG CHEW    Adult Multivitamin (w-lutein)   1 tablet by mouth daily    TAMSULOSIN (FLOMAX) 0.4 MG CAP    Take 1 capsule by mouth nightly.   Changed and/or Refilled Medications    Modified Medication Previous Medication    METFORMIN (GLUCOPHAGE-XR) 500 MG ER 24HR TABLET metFORMIN (GLUCOPHAGE-XR) 500 MG ER 24hr tablet       Take 2 tablets (1,000 mg total) by mouth 2 (two) times daily with meals.    Take 2 tablets (1,000 mg total) by mouth 2 (two) times daily with meals.       Charles Nash M.D.     ==========================================================================  Subjective:      Patient ID: Luis Lozano is a 60 y.o. male.  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     Chief Complaint: Follow-up (3 month DM), Hypertension, and Diabetes      Problem List Items Addressed This Visit     Hypertension associated with diabetes (Chronic)    Overview     December 2020:  Blood pressure is well controlled today.  Patient reports compliance with medication regimen.  SEPTEMBER 2020:  Patient with recent elevations in his blood pressures.  Recently went to ER for chest pain but was given medication for reflux that resolve the pain.  Patient has been doing well since then.  CHRONIC. STABLE. BP Reviewed.  Compliant with BP medications. No SE reported.   (-) CP, SOB, palpitations, dizziness, lightheadedness, HA, arm numbness, tingling or weakness, syncope.  Creatinine   Date Value Ref Range Status   11/02/2020 0.95 0.50 - 1.40 mg/dL Final     Initial HPI:  Chronic.  Stable.  Patient taking hydrochlorothiazide 12.5 mg amlodipine 2.5 mg.  Reports compliance.  No side effects reported.  No symptoms of chest pain shortness of breath blurry vision etc.  November 2019:  Patient reports that he did have episode of vertigo and went to the ER but  was given meclizine and Tessalon Perles for the cough.  Has not been having issues since then.CHRONIC. STABLE. BP Reviewed.  Compliant with BP medications. No SE reported.            Current Assessment & Plan     Continue blood pressure medication regimen.Counseled on importance of hypertension disease course, I recommend ongoing Education for DASH-diet and exercise.  Counseled on medication regimen importance of treating high blood pressure.  Please be advised of risk of untreated blood pressure as discussed.  Please advised of ER precautions were given for symptoms of hypertensive urgency and emergency.           Hyperlipidemia associated with type 2 diabetes mellitus (Chronic)    Overview     Initial HPI:  Chronic.  Control is uncertain.  Due for labs.  On atorvastatin 20 mg.  Reports compliance.  No side effects reported.  November 2019:  Patient has been on increased dose of atorvastatin 40 mg and is not having any side effects currently.  September 2020:  Compliant with medications.  Patient is due for fasting lipid panel.  December 2020:  Patient is due for updated lipid panel.  Will plan for fasting blood work.  Continue medication regimen.  CHRONIC. STABLE. Lab analysis reviewed.   (-) CP, SOB, abdominal pain, N/V/D, constipation, jaundice, skin changes.  (-) Myalgias  Lab Results   Component Value Date    CHOL 160 11/22/2019    CHOL 185 08/05/2019    CHOL 225 (H) 06/03/2017     Lab Results   Component Value Date    HDL 53 11/22/2019    HDL 57 08/05/2019    HDL 52 03/08/2018     Lab Results   Component Value Date    LDLCALC 89.0 11/22/2019    LDLCALC 111.4 08/05/2019    LDLCALC 93 03/08/2018     Lab Results   Component Value Date    TRIG 90 11/22/2019    TRIG 83 08/05/2019    TRIG 72 03/08/2018     Lab Results   Component Value Date    CHOLHDL 33.1 11/22/2019    CHOLHDL 30.8 08/05/2019    CHOLHDL 24.9 06/03/2017     Lab Results   Component Value Date    TOTALCHOLEST 3.0 11/22/2019    TOTALCHOLEST 3.2  08/05/2019    TOTALCHOLEST 4.0 06/03/2017     Lab Results   Component Value Date    ALT 29 11/02/2020    AST 32 11/02/2020    ALKPHOS 53 11/02/2020    BILITOT 0.6 11/02/2020     ======================================================         Current Assessment & Plan     Continue Lipitor.  Check fasting lipid panel.Counseled on hyperlipidemia disease course, healthy diet and increased need for exercise.  Please be advised of the risk of cardiovascular disease, increase stroke and heart attack risk with uncontrolled/untreated hyperlipidemia.     Patient voiced understanding and understood the treatment plan. All questions were answered.              Type 2 diabetes mellitus with hyperglycemia, without long-term current use of insulin - Primary (Chronic)    Overview     November 2019:  ReviewedDiabetes Management StatusStatin: TakingACE/ARB: Not taking  MARCH 2020:  Reviewed Diabetes Management StatusStatin: TakingACE/ARB: Taking  SEPTEMBER 2020:  Reviewed Diabetes Management Status Statin: TakingACE/ARB: Taking  December 2020:  Reviewed Diabetes Management StatusStatin: TakingACE/ARB: Taking patient admits to diet noncompliance and medication not being taken as prescribed.  Patient reports that he decrease the dosage of metformin on his own.    Screening or Prevention Patient's value Goal Complete/Controlled?   HgA1C Testing and Control   Lab Results   Component Value Date    HGBA1C 9.2 (H) 12/09/2020      Annually/Less than 8% No   Lipid profile : 11/22/2019 Annually No   LDL control Lab Results   Component Value Date    LDLCALC 89.0 11/22/2019    Annually/Less than 100 mg/dl  No   Nephropathy screening Lab Results   Component Value Date    LABMICR 17.0 11/22/2019     Lab Results   Component Value Date    PROTEINUA Negative 11/13/2019    Annually No   Blood pressure BP Readings from Last 1 Encounters:   12/18/20 118/68    Less than 140/90 Yes   Dilated retinal exam : 07/07/2020 Annually Yes   Foot exam   : 09/09/2020  Annually Yes            Current Assessment & Plan     Increase metformin back to prescribed dose of 1000 mg twice a day recheck A1c in three months.We will plan to monitor hemoglobin A1c at designated intervals 3 to 6 months.  I recommend ongoing Education for diabetic diet and exercise protocol.  We will continue to monitor for side effects.    Please be advised of symptoms to monitor for and to notify me immediately if persistent or worsening.  Follow up with Ophthalmology/Optometry and Podiatry at least annually.           Encounter for long-term (current) use of medications (Chronic)    Overview     08/06/2019 : CHRONIC long-term drug therapy for managed conditions. See medication list. Reports compliance.  No side effects reported.  Routine lab work is being monitored.  Patient does  need refills today.   November 2019:  CHRONIC. Stable. Compliant with medications for managed conditions. See medication list. No SE reported. Routine lab analysis is being monitored. Refills were addressed.  SEPTEMBER 2020:  Reviewed labs.  CHRONIC. Stable. Compliant with medications for managed conditions. See medication list. No SE reported. Routine lab analysis is being monitored. Refills were addressed.  ==================================================  DECEMBER 2020:  Reviewed labs.  CHRONIC. Stable. Compliant with medications for managed conditions. See medication list. No SE reported.   Routine lab analysis is being monitored. Refills were addressed.  Lab Results   Component Value Date    WBC 9.19 11/02/2020    HGB 16.2 11/02/2020    HCT 48.3 11/02/2020    MCV 89 11/02/2020     11/02/2020         Chemistry        Component Value Date/Time     (L) 11/02/2020 2026    K 3.9 11/02/2020 2026    CL 99 11/02/2020 2026    CO2 31 11/02/2020 2026    BUN 26 (H) 11/02/2020 2026    CREATININE 0.95 11/02/2020 2026     (H) 11/02/2020 2026        Component Value Date/Time    CALCIUM 9.6 11/02/2020 2026    ALKPHOS 53  11/02/2020 2026    AST 32 11/02/2020 2026    ALT 29 11/02/2020 2026    BILITOT 0.6 11/02/2020 2026    ESTGFRAFRICA >60 11/02/2020 2026    EGFRNONAA >60 11/02/2020 2026          Lab Results   Component Value Date    TSH 0.796 08/05/2019    Z6TVAAI 7.2 08/05/2019    T3FREE 2.6 08/05/2019       =================================================         Current Assessment & Plan     See diabetes.  Reviewed labs.  Continue medications.Complete history and physical was completed today.  Complete and thorough medication reconciliation was performed.  Discussed risks and benefits of medications.  Advised patient on orders and health maintenance.  We discussed old records and old labs if available.  Will request any records not available through epic.  Continue current medications listed on your summary sheet.             Other Visit Diagnoses     Uncontrolled type 2 diabetes mellitus with hyperglycemia               Past Medical History:  Past Medical History:   Diagnosis Date    Diabetes mellitus, type 2     Hypertension     Spermatocele 9/3/2019     History reviewed. No pertinent surgical history.  Review of patient's allergies indicates:  No Known Allergies  Medication List with Changes/Refills   Current Medications    ASPIRIN (ECOTRIN) 81 MG EC TABLET    Take 1 tablet (81 mg total) by mouth once daily.    ATORVASTATIN (LIPITOR) 40 MG TABLET    Take 1 tablet (40 mg total) by mouth once daily.    AZELASTINE (ASTELIN) 137 MCG (0.1 %) NASAL SPRAY    1 spray (137 mcg total) by Nasal route 2 (two) times daily. for 7 days    DICLOFENAC SODIUM (VOLTAREN) 1 % GEL        FLUTICASONE PROPIONATE (FLONASE) 50 MCG/ACTUATION NASAL SPRAY    1 spray (50 mcg total) by Each Nostril route 2 (two) times daily as needed.    GABAPENTIN (NEURONTIN) 300 MG CAPSULE    Take 1 capsule (300 mg total) by mouth 3 (three) times daily.    GLIMEPIRIDE (AMARYL) 4 MG TABLET    Take 2 tablets (8 mg total) by mouth daily with breakfast.     HYDROCHLOROTHIAZIDE (HYDRODIURIL) 25 MG TABLET    Take 1 tablet (25 mg total) by mouth once daily.    LISINOPRIL (PRINIVIL,ZESTRIL) 5 MG TABLET    Take 1 tablet (5 mg total) by mouth once daily.    MECLIZINE (ANTIVERT) 12.5 MG TABLET    Take 2 tablets (25 mg total) by mouth 3 (three) times daily as needed for Dizziness.    MV-MIN-FOLIC ACID-LUTEIN (ADULT MULTIVITAMIN, W-LUTEIN,) 200-137.5 MCG CHEW    Adult Multivitamin (w-lutein)   1 tablet by mouth daily    TAMSULOSIN (FLOMAX) 0.4 MG CAP    Take 1 capsule by mouth nightly.   Changed and/or Refilled Medications    Modified Medication Previous Medication    METFORMIN (GLUCOPHAGE-XR) 500 MG ER 24HR TABLET metFORMIN (GLUCOPHAGE-XR) 500 MG ER 24hr tablet       Take 2 tablets (1,000 mg total) by mouth 2 (two) times daily with meals.    Take 2 tablets (1,000 mg total) by mouth 2 (two) times daily with meals.      Social History     Tobacco Use    Smoking status: Never Smoker    Smokeless tobacco: Never Used   Substance Use Topics    Alcohol use: No     Frequency: Never     Drinks per session: 1 or 2     Binge frequency: Never      Family History   Problem Relation Age of Onset    Diabetes Mother             Heart disease Mother 63    Lung cancer Father     Hypertension Maternal Grandmother        I have reviewed the complete PMH, social history, surgical history, allergies and medications.  As well as family history.    Review of Systems   Constitutional: Negative for activity change and fatigue.   HENT: Negative for congestion and sinus pain.    Eyes: Negative for visual disturbance.   Respiratory: Negative for chest tightness and shortness of breath.    Cardiovascular: Negative for palpitations and leg swelling.   Gastrointestinal: Negative for abdominal pain, diarrhea and nausea.   Endocrine: Negative for polyuria.   Genitourinary: Negative for difficulty urinating and frequency.   Musculoskeletal: Negative for arthralgias and joint swelling.   Skin:  "Negative for rash.   Neurological: Negative for dizziness and headaches.   Psychiatric/Behavioral: Negative for agitation. The patient is not nervous/anxious.      Objective:   /68   Pulse 86   Temp 97.9 °F (36.6 °C) (Temporal)   Ht 5' 9" (1.753 m)   Wt 95.9 kg (211 lb 6.4 oz)   BMI 31.22 kg/m²   Physical Exam  Vitals signs and nursing note reviewed.   Constitutional:       General: He is not in acute distress.     Appearance: He is well-developed.   HENT:      Head: Normocephalic and atraumatic.      Right Ear: External ear normal.      Left Ear: External ear normal.      Nose: Nose normal. No rhinorrhea.   Eyes:      Extraocular Movements: Extraocular movements intact.      Pupils: Pupils are equal, round, and reactive to light.   Neck:      Musculoskeletal: Normal range of motion and neck supple.   Cardiovascular:      Rate and Rhythm: Normal rate.      Pulses: Normal pulses.   Pulmonary:      Effort: Pulmonary effort is normal. No respiratory distress.      Breath sounds: Normal breath sounds.   Musculoskeletal: Normal range of motion.   Skin:     General: Skin is warm and dry.      Capillary Refill: Capillary refill takes less than 2 seconds.   Neurological:      General: No focal deficit present.      Mental Status: He is alert and oriented to person, place, and time.   Psychiatric:         Mood and Affect: Mood normal.         Behavior: Behavior normal.         Assessment:     1. Type 2 diabetes mellitus with hyperglycemia, without long-term current use of insulin    2. Hyperlipidemia associated with type 2 diabetes mellitus    3. Hypertension associated with diabetes    4. Encounter for long-term (current) use of medications    5. Uncontrolled type 2 diabetes mellitus with hyperglycemia      MDM:   Moderate complexity.  Moderate risk.  I have Reviewed and summarized old records.  I have performed thorough medication reconciliation today and discussed risk and benefits of each medication.  I have " reviewed labs and discussed with patient.  All questions were answered.  I am requesting old records and will review them once they are available.  Cardiology    I have signed for the following orders AND/OR meds.  No orders of the defined types were placed in this encounter.    Medications Ordered This Encounter   Medications    metFORMIN (GLUCOPHAGE-XR) 500 MG ER 24hr tablet     Sig: Take 2 tablets (1,000 mg total) by mouth 2 (two) times daily with meals.     Dispense:  360 tablet     Refill:  3        Follow up in about 3 months (around 3/18/2021), or if symptoms worsen or fail to improve, for DM.    If no improvement in symptoms or symptoms worsen, advised to call/follow-up at clinic or go to ER. Patient voiced understanding and all questions/concerns were addressed.     DISCLAIMER: This note was compiled by using a speech recognition dictation system and therefore please be aware that typographical / speech recognition errors can and do occur.  Please contact me if you see any errors specifically.    Charles Nash M.D.       Office: [phone] [address] [place] [address]  FAX: [phone]

## 2020-12-18 NOTE — ASSESSMENT & PLAN NOTE
Increase metformin back to prescribed dose of 1000 mg twice a day recheck A1c in three months.We will plan to monitor hemoglobin A1c at designated intervals 3 to 6 months.  I recommend ongoing Education for diabetic diet and exercise protocol.  We will continue to monitor for side effects.    Please be advised of symptoms to monitor for and to notify me immediately if persistent or worsening.  Follow up with Ophthalmology/Optometry and Podiatry at least annually.

## 2020-12-18 NOTE — ASSESSMENT & PLAN NOTE
Continue blood pressure medication regimen.Counseled on importance of hypertension disease course, I recommend ongoing Education for DASH-diet and exercise.  Counseled on medication regimen importance of treating high blood pressure.  Please be advised of risk of untreated blood pressure as discussed.  Please advised of ER precautions were given for symptoms of hypertensive urgency and emergency.

## 2020-12-18 NOTE — ASSESSMENT & PLAN NOTE
Continue Lipitor.  Check fasting lipid panel.Counseled on hyperlipidemia disease course, healthy diet and increased need for exercise.  Please be advised of the risk of cardiovascular disease, increase stroke and heart attack risk with uncontrolled/untreated hyperlipidemia.     Patient voiced understanding and understood the treatment plan. All questions were answered.

## 2020-12-18 NOTE — ASSESSMENT & PLAN NOTE
See diabetes.  Reviewed labs.  Continue medications.Complete history and physical was completed today.  Complete and thorough medication reconciliation was performed.  Discussed risks and benefits of medications.  Advised patient on orders and health maintenance.  We discussed old records and old labs if available.  Will request any records not available through epic.  Continue current medications listed on your summary sheet.

## 2020-12-21 ENCOUNTER — PATIENT MESSAGE (OUTPATIENT)
Dept: FAMILY MEDICINE | Facility: CLINIC | Age: 60
End: 2020-12-21

## 2020-12-21 DIAGNOSIS — R09.81 CONGESTION OF NASAL SINUS: Primary | ICD-10-CM

## 2020-12-21 RX ORDER — DEXTROMETHORPHAN HYDROBROMIDE, GUAIFENESIN, PHENYLEPHRINE HYDROCHLORIDE 17.5; 400; 1 MG/1; MG/1; MG/1
1 TABLET ORAL EVERY 6 HOURS PRN
Qty: 30 TABLET | Refills: 0 | Status: SHIPPED | OUTPATIENT
Start: 2020-12-21 | End: 2020-12-31

## 2021-01-09 ENCOUNTER — PATIENT MESSAGE (OUTPATIENT)
Dept: GASTROENTEROLOGY | Facility: CLINIC | Age: 61
End: 2021-01-09

## 2021-01-10 ENCOUNTER — PATIENT MESSAGE (OUTPATIENT)
Dept: FAMILY MEDICINE | Facility: CLINIC | Age: 61
End: 2021-01-10

## 2021-01-10 DIAGNOSIS — A49.9 BACTERIAL INFECTION: Primary | ICD-10-CM

## 2021-01-11 RX ORDER — AZITHROMYCIN 250 MG/1
TABLET, FILM COATED ORAL
Qty: 6 TABLET | Refills: 0 | Status: SHIPPED | OUTPATIENT
Start: 2021-01-11 | End: 2021-01-16

## 2021-01-12 ENCOUNTER — OFFICE VISIT (OUTPATIENT)
Dept: CARDIOLOGY | Facility: CLINIC | Age: 61
End: 2021-01-12
Payer: COMMERCIAL

## 2021-01-12 VITALS
HEART RATE: 90 BPM | TEMPERATURE: 98 F | BODY MASS INDEX: 31.35 KG/M2 | OXYGEN SATURATION: 97 % | WEIGHT: 212.31 LBS | DIASTOLIC BLOOD PRESSURE: 82 MMHG | SYSTOLIC BLOOD PRESSURE: 122 MMHG

## 2021-01-12 DIAGNOSIS — E11.65 TYPE 2 DIABETES MELLITUS WITH HYPERGLYCEMIA, WITHOUT LONG-TERM CURRENT USE OF INSULIN: Chronic | ICD-10-CM

## 2021-01-12 DIAGNOSIS — E11.69 HYPERLIPIDEMIA ASSOCIATED WITH TYPE 2 DIABETES MELLITUS: Chronic | ICD-10-CM

## 2021-01-12 DIAGNOSIS — I15.2 HYPERTENSION ASSOCIATED WITH DIABETES: Primary | Chronic | ICD-10-CM

## 2021-01-12 DIAGNOSIS — R42 DIZZINESS: ICD-10-CM

## 2021-01-12 DIAGNOSIS — R00.2 PALPITATIONS: ICD-10-CM

## 2021-01-12 DIAGNOSIS — E78.5 HYPERLIPIDEMIA ASSOCIATED WITH TYPE 2 DIABETES MELLITUS: Chronic | ICD-10-CM

## 2021-01-12 DIAGNOSIS — E11.59 HYPERTENSION ASSOCIATED WITH DIABETES: Primary | Chronic | ICD-10-CM

## 2021-01-12 PROCEDURE — 3079F DIAST BP 80-89 MM HG: CPT | Mod: CPTII,S$GLB,, | Performed by: INTERNAL MEDICINE

## 2021-01-12 PROCEDURE — 3008F BODY MASS INDEX DOCD: CPT | Mod: CPTII,S$GLB,, | Performed by: INTERNAL MEDICINE

## 2021-01-12 PROCEDURE — 3079F PR MOST RECENT DIASTOLIC BLOOD PRESSURE 80-89 MM HG: ICD-10-PCS | Mod: CPTII,S$GLB,, | Performed by: INTERNAL MEDICINE

## 2021-01-12 PROCEDURE — 99205 OFFICE O/P NEW HI 60 MIN: CPT | Mod: S$GLB,,, | Performed by: INTERNAL MEDICINE

## 2021-01-12 PROCEDURE — 3046F HEMOGLOBIN A1C LEVEL >9.0%: CPT | Mod: CPTII,S$GLB,, | Performed by: INTERNAL MEDICINE

## 2021-01-12 PROCEDURE — 1126F PR PAIN SEVERITY QUANTIFIED, NO PAIN PRESENT: ICD-10-PCS | Mod: S$GLB,,, | Performed by: INTERNAL MEDICINE

## 2021-01-12 PROCEDURE — 3008F PR BODY MASS INDEX (BMI) DOCUMENTED: ICD-10-PCS | Mod: CPTII,S$GLB,, | Performed by: INTERNAL MEDICINE

## 2021-01-12 PROCEDURE — 1126F AMNT PAIN NOTED NONE PRSNT: CPT | Mod: S$GLB,,, | Performed by: INTERNAL MEDICINE

## 2021-01-12 PROCEDURE — 3046F PR MOST RECENT HEMOGLOBIN A1C LEVEL > 9.0%: ICD-10-PCS | Mod: CPTII,S$GLB,, | Performed by: INTERNAL MEDICINE

## 2021-01-12 PROCEDURE — 99999 PR PBB SHADOW E&M-EST. PATIENT-LVL IV: ICD-10-PCS | Mod: PBBFAC,,, | Performed by: INTERNAL MEDICINE

## 2021-01-12 PROCEDURE — 3074F PR MOST RECENT SYSTOLIC BLOOD PRESSURE < 130 MM HG: ICD-10-PCS | Mod: CPTII,S$GLB,, | Performed by: INTERNAL MEDICINE

## 2021-01-12 PROCEDURE — 99999 PR PBB SHADOW E&M-EST. PATIENT-LVL IV: CPT | Mod: PBBFAC,,, | Performed by: INTERNAL MEDICINE

## 2021-01-12 PROCEDURE — 99205 PR OFFICE/OUTPT VISIT, NEW, LEVL V, 60-74 MIN: ICD-10-PCS | Mod: S$GLB,,, | Performed by: INTERNAL MEDICINE

## 2021-01-12 PROCEDURE — 3074F SYST BP LT 130 MM HG: CPT | Mod: CPTII,S$GLB,, | Performed by: INTERNAL MEDICINE

## 2021-01-27 ENCOUNTER — PATIENT OUTREACH (OUTPATIENT)
Dept: ADMINISTRATIVE | Facility: OTHER | Age: 61
End: 2021-01-27

## 2021-01-27 DIAGNOSIS — R42 DIZZINESS: Primary | ICD-10-CM

## 2021-01-28 ENCOUNTER — OFFICE VISIT (OUTPATIENT)
Dept: OTOLARYNGOLOGY | Facility: CLINIC | Age: 61
End: 2021-01-28
Payer: COMMERCIAL

## 2021-01-28 ENCOUNTER — CLINICAL SUPPORT (OUTPATIENT)
Dept: AUDIOLOGY | Facility: CLINIC | Age: 61
End: 2021-01-28
Payer: COMMERCIAL

## 2021-01-28 ENCOUNTER — OFFICE VISIT (OUTPATIENT)
Dept: GASTROENTEROLOGY | Facility: CLINIC | Age: 61
End: 2021-01-28
Payer: COMMERCIAL

## 2021-01-28 ENCOUNTER — TELEPHONE (OUTPATIENT)
Dept: CARDIOLOGY | Facility: CLINIC | Age: 61
End: 2021-01-28

## 2021-01-28 VITALS — BODY MASS INDEX: 31.44 KG/M2 | RESPIRATION RATE: 17 BRPM | HEIGHT: 69 IN | WEIGHT: 212.31 LBS

## 2021-01-28 VITALS
DIASTOLIC BLOOD PRESSURE: 80 MMHG | HEIGHT: 69 IN | BODY MASS INDEX: 31.44 KG/M2 | SYSTOLIC BLOOD PRESSURE: 137 MMHG | WEIGHT: 212.31 LBS

## 2021-01-28 DIAGNOSIS — R42 DIZZINESS: ICD-10-CM

## 2021-01-28 DIAGNOSIS — R10.13 EPIGASTRIC PAIN: ICD-10-CM

## 2021-01-28 DIAGNOSIS — H90.3 BILATERAL HIGH FREQUENCY SENSORINEURAL HEARING LOSS: Primary | ICD-10-CM

## 2021-01-28 DIAGNOSIS — Z01.812 PRE-PROCEDURE LAB EXAM: ICD-10-CM

## 2021-01-28 DIAGNOSIS — R42 LIGHTHEADEDNESS: ICD-10-CM

## 2021-01-28 DIAGNOSIS — R26.89 IMBALANCE: Primary | ICD-10-CM

## 2021-01-28 DIAGNOSIS — K21.9 GASTROESOPHAGEAL REFLUX DISEASE, UNSPECIFIED WHETHER ESOPHAGITIS PRESENT: Primary | ICD-10-CM

## 2021-01-28 DIAGNOSIS — H90.3 BILATERAL HIGH FREQUENCY SENSORINEURAL HEARING LOSS: ICD-10-CM

## 2021-01-28 PROCEDURE — 3074F PR MOST RECENT SYSTOLIC BLOOD PRESSURE < 130 MM HG: ICD-10-PCS | Mod: CPTII,S$GLB,, | Performed by: INTERNAL MEDICINE

## 2021-01-28 PROCEDURE — 3075F SYST BP GE 130 - 139MM HG: CPT | Mod: CPTII,S$GLB,, | Performed by: NURSE PRACTITIONER

## 2021-01-28 PROCEDURE — 99999 PR PBB SHADOW E&M-EST. PATIENT-LVL III: ICD-10-PCS | Mod: PBBFAC,,, | Performed by: INTERNAL MEDICINE

## 2021-01-28 PROCEDURE — 92567 PR TYMPA2METRY: ICD-10-PCS | Mod: S$GLB,,, | Performed by: AUDIOLOGIST-HEARING AID FITTER

## 2021-01-28 PROCEDURE — 1126F AMNT PAIN NOTED NONE PRSNT: CPT | Mod: S$GLB,,, | Performed by: NURSE PRACTITIONER

## 2021-01-28 PROCEDURE — 1126F AMNT PAIN NOTED NONE PRSNT: CPT | Mod: S$GLB,,, | Performed by: INTERNAL MEDICINE

## 2021-01-28 PROCEDURE — 3079F PR MOST RECENT DIASTOLIC BLOOD PRESSURE 80-89 MM HG: ICD-10-PCS | Mod: CPTII,S$GLB,, | Performed by: NURSE PRACTITIONER

## 2021-01-28 PROCEDURE — 99203 PR OFFICE/OUTPT VISIT, NEW, LEVL III, 30-44 MIN: ICD-10-PCS | Mod: S$GLB,,, | Performed by: INTERNAL MEDICINE

## 2021-01-28 PROCEDURE — 3075F PR MOST RECENT SYSTOLIC BLOOD PRESS GE 130-139MM HG: ICD-10-PCS | Mod: CPTII,S$GLB,, | Performed by: NURSE PRACTITIONER

## 2021-01-28 PROCEDURE — 3008F BODY MASS INDEX DOCD: CPT | Mod: CPTII,S$GLB,, | Performed by: INTERNAL MEDICINE

## 2021-01-28 PROCEDURE — 1126F PR PAIN SEVERITY QUANTIFIED, NO PAIN PRESENT: ICD-10-PCS | Mod: S$GLB,,, | Performed by: NURSE PRACTITIONER

## 2021-01-28 PROCEDURE — 3074F SYST BP LT 130 MM HG: CPT | Mod: CPTII,S$GLB,, | Performed by: INTERNAL MEDICINE

## 2021-01-28 PROCEDURE — 99999 PR PBB SHADOW E&M-EST. PATIENT-LVL IV: ICD-10-PCS | Mod: PBBFAC,,, | Performed by: NURSE PRACTITIONER

## 2021-01-28 PROCEDURE — 1126F PR PAIN SEVERITY QUANTIFIED, NO PAIN PRESENT: ICD-10-PCS | Mod: S$GLB,,, | Performed by: INTERNAL MEDICINE

## 2021-01-28 PROCEDURE — 99999 PR PBB SHADOW E&M-EST. PATIENT-LVL IV: CPT | Mod: PBBFAC,,, | Performed by: NURSE PRACTITIONER

## 2021-01-28 PROCEDURE — 92557 COMPREHENSIVE HEARING TEST: CPT | Mod: S$GLB,,, | Performed by: AUDIOLOGIST-HEARING AID FITTER

## 2021-01-28 PROCEDURE — 3008F PR BODY MASS INDEX (BMI) DOCUMENTED: ICD-10-PCS | Mod: CPTII,S$GLB,, | Performed by: NURSE PRACTITIONER

## 2021-01-28 PROCEDURE — 99999 PR PBB SHADOW E&M-EST. PATIENT-LVL II: CPT | Mod: PBBFAC,,,

## 2021-01-28 PROCEDURE — 99203 PR OFFICE/OUTPT VISIT, NEW, LEVL III, 30-44 MIN: ICD-10-PCS | Mod: S$GLB,,, | Performed by: NURSE PRACTITIONER

## 2021-01-28 PROCEDURE — 99999 PR PBB SHADOW E&M-EST. PATIENT-LVL II: ICD-10-PCS | Mod: PBBFAC,,,

## 2021-01-28 PROCEDURE — 92567 TYMPANOMETRY: CPT | Mod: S$GLB,,, | Performed by: AUDIOLOGIST-HEARING AID FITTER

## 2021-01-28 PROCEDURE — 3079F DIAST BP 80-89 MM HG: CPT | Mod: CPTII,S$GLB,, | Performed by: NURSE PRACTITIONER

## 2021-01-28 PROCEDURE — 3008F PR BODY MASS INDEX (BMI) DOCUMENTED: ICD-10-PCS | Mod: CPTII,S$GLB,, | Performed by: INTERNAL MEDICINE

## 2021-01-28 PROCEDURE — 99999 PR PBB SHADOW E&M-EST. PATIENT-LVL III: CPT | Mod: PBBFAC,,, | Performed by: INTERNAL MEDICINE

## 2021-01-28 PROCEDURE — 3078F DIAST BP <80 MM HG: CPT | Mod: CPTII,S$GLB,, | Performed by: INTERNAL MEDICINE

## 2021-01-28 PROCEDURE — 3078F PR MOST RECENT DIASTOLIC BLOOD PRESSURE < 80 MM HG: ICD-10-PCS | Mod: CPTII,S$GLB,, | Performed by: INTERNAL MEDICINE

## 2021-01-28 PROCEDURE — 99203 OFFICE O/P NEW LOW 30 MIN: CPT | Mod: S$GLB,,, | Performed by: INTERNAL MEDICINE

## 2021-01-28 PROCEDURE — 92557 PR COMPREHENSIVE HEARING TEST: ICD-10-PCS | Mod: S$GLB,,, | Performed by: AUDIOLOGIST-HEARING AID FITTER

## 2021-01-28 PROCEDURE — 3008F BODY MASS INDEX DOCD: CPT | Mod: CPTII,S$GLB,, | Performed by: NURSE PRACTITIONER

## 2021-01-28 PROCEDURE — 99203 OFFICE O/P NEW LOW 30 MIN: CPT | Mod: S$GLB,,, | Performed by: NURSE PRACTITIONER

## 2021-01-30 ENCOUNTER — LAB VISIT (OUTPATIENT)
Dept: FAMILY MEDICINE | Facility: CLINIC | Age: 61
End: 2021-01-30
Payer: COMMERCIAL

## 2021-01-30 DIAGNOSIS — Z01.812 PRE-PROCEDURE LAB EXAM: ICD-10-CM

## 2021-01-30 PROCEDURE — U0003 INFECTIOUS AGENT DETECTION BY NUCLEIC ACID (DNA OR RNA); SEVERE ACUTE RESPIRATORY SYNDROME CORONAVIRUS 2 (SARS-COV-2) (CORONAVIRUS DISEASE [COVID-19]), AMPLIFIED PROBE TECHNIQUE, MAKING USE OF HIGH THROUGHPUT TECHNOLOGIES AS DESCRIBED BY CMS-2020-01-R: HCPCS

## 2021-01-31 LAB — SARS-COV-2 RNA RESP QL NAA+PROBE: NOT DETECTED

## 2021-02-01 ENCOUNTER — TELEPHONE (OUTPATIENT)
Dept: GASTROENTEROLOGY | Facility: CLINIC | Age: 61
End: 2021-02-01

## 2021-02-10 ENCOUNTER — TELEPHONE (OUTPATIENT)
Dept: CARDIOLOGY | Facility: CLINIC | Age: 61
End: 2021-02-10

## 2021-02-26 RX ORDER — MECLIZINE HCL 12.5 MG 12.5 MG/1
TABLET ORAL
Qty: 30 TABLET | Refills: 12 | Status: SHIPPED | OUTPATIENT
Start: 2021-02-26 | End: 2022-12-20 | Stop reason: SDUPTHER

## 2021-03-03 ENCOUNTER — TELEPHONE (OUTPATIENT)
Dept: CARDIOLOGY | Facility: CLINIC | Age: 61
End: 2021-03-03

## 2021-03-03 ENCOUNTER — TELEPHONE (OUTPATIENT)
Dept: AUDIOLOGY | Facility: CLINIC | Age: 61
End: 2021-03-03

## 2021-03-07 ENCOUNTER — PATIENT MESSAGE (OUTPATIENT)
Dept: CARDIOLOGY | Facility: CLINIC | Age: 61
End: 2021-03-07

## 2021-03-08 ENCOUNTER — TELEPHONE (OUTPATIENT)
Dept: CARDIOLOGY | Facility: CLINIC | Age: 61
End: 2021-03-08

## 2021-03-09 ENCOUNTER — CLINICAL SUPPORT (OUTPATIENT)
Dept: AUDIOLOGY | Facility: CLINIC | Age: 61
End: 2021-03-09
Payer: COMMERCIAL

## 2021-03-09 DIAGNOSIS — R42 DIZZINESS: ICD-10-CM

## 2021-03-09 DIAGNOSIS — H83.2X2 VESTIBULAR HYPOFUNCTION OF LEFT EAR: Primary | ICD-10-CM

## 2021-03-09 PROCEDURE — 92540 PR VESTIBULAR EVAL NYSTAG FOVL&PERPH STIM OSCIL TRACKING: ICD-10-PCS | Mod: S$GLB,,, | Performed by: AUDIOLOGIST

## 2021-03-09 PROCEDURE — 92537 CALORIC VSTBLR TEST W/REC: CPT | Mod: S$GLB,,, | Performed by: AUDIOLOGIST

## 2021-03-09 PROCEDURE — 92540 BASIC VESTIBULAR EVALUATION: CPT | Mod: S$GLB,,, | Performed by: AUDIOLOGIST

## 2021-03-09 PROCEDURE — 92537 PR CALORIC VSTBLR TEST W/REC BITHERMAL: ICD-10-PCS | Mod: S$GLB,,, | Performed by: AUDIOLOGIST

## 2021-03-11 ENCOUNTER — PATIENT MESSAGE (OUTPATIENT)
Dept: OTOLARYNGOLOGY | Facility: CLINIC | Age: 61
End: 2021-03-11

## 2021-03-11 DIAGNOSIS — H83.2X2 VESTIBULAR HYPOFUNCTION, LEFT: Primary | ICD-10-CM

## 2021-05-06 ENCOUNTER — PATIENT MESSAGE (OUTPATIENT)
Dept: RESEARCH | Facility: HOSPITAL | Age: 61
End: 2021-05-06

## 2021-05-12 DIAGNOSIS — E11.9 TYPE 2 DIABETES MELLITUS WITHOUT COMPLICATION: ICD-10-CM

## 2021-07-28 ENCOUNTER — OFFICE VISIT (OUTPATIENT)
Dept: CARDIOLOGY | Facility: CLINIC | Age: 61
End: 2021-07-28
Payer: COMMERCIAL

## 2021-07-28 VITALS
SYSTOLIC BLOOD PRESSURE: 124 MMHG | WEIGHT: 212.81 LBS | HEIGHT: 69 IN | HEART RATE: 73 BPM | OXYGEN SATURATION: 97 % | BODY MASS INDEX: 31.52 KG/M2 | RESPIRATION RATE: 16 BRPM | DIASTOLIC BLOOD PRESSURE: 72 MMHG

## 2021-07-28 DIAGNOSIS — E78.5 HYPERLIPIDEMIA ASSOCIATED WITH TYPE 2 DIABETES MELLITUS: Chronic | ICD-10-CM

## 2021-07-28 DIAGNOSIS — E11.69 HYPERLIPIDEMIA ASSOCIATED WITH TYPE 2 DIABETES MELLITUS: Chronic | ICD-10-CM

## 2021-07-28 DIAGNOSIS — R00.2 PALPITATIONS: ICD-10-CM

## 2021-07-28 DIAGNOSIS — I15.2 HYPERTENSION ASSOCIATED WITH DIABETES: Primary | Chronic | ICD-10-CM

## 2021-07-28 DIAGNOSIS — E11.59 HYPERTENSION ASSOCIATED WITH DIABETES: Primary | Chronic | ICD-10-CM

## 2021-07-28 PROCEDURE — 99214 PR OFFICE/OUTPT VISIT, EST, LEVL IV, 30-39 MIN: ICD-10-PCS | Mod: S$GLB,,, | Performed by: INTERNAL MEDICINE

## 2021-07-28 PROCEDURE — 1160F RVW MEDS BY RX/DR IN RCRD: CPT | Mod: CPTII,S$GLB,, | Performed by: INTERNAL MEDICINE

## 2021-07-28 PROCEDURE — 1160F PR REVIEW ALL MEDS BY PRESCRIBER/CLIN PHARMACIST DOCUMENTED: ICD-10-PCS | Mod: CPTII,S$GLB,, | Performed by: INTERNAL MEDICINE

## 2021-07-28 PROCEDURE — 99999 PR PBB SHADOW E&M-EST. PATIENT-LVL III: CPT | Mod: PBBFAC,,, | Performed by: INTERNAL MEDICINE

## 2021-07-28 PROCEDURE — 3078F PR MOST RECENT DIASTOLIC BLOOD PRESSURE < 80 MM HG: ICD-10-PCS | Mod: CPTII,S$GLB,, | Performed by: INTERNAL MEDICINE

## 2021-07-28 PROCEDURE — 3008F PR BODY MASS INDEX (BMI) DOCUMENTED: ICD-10-PCS | Mod: CPTII,S$GLB,, | Performed by: INTERNAL MEDICINE

## 2021-07-28 PROCEDURE — 3078F DIAST BP <80 MM HG: CPT | Mod: CPTII,S$GLB,, | Performed by: INTERNAL MEDICINE

## 2021-07-28 PROCEDURE — 3074F PR MOST RECENT SYSTOLIC BLOOD PRESSURE < 130 MM HG: ICD-10-PCS | Mod: CPTII,S$GLB,, | Performed by: INTERNAL MEDICINE

## 2021-07-28 PROCEDURE — 3074F SYST BP LT 130 MM HG: CPT | Mod: CPTII,S$GLB,, | Performed by: INTERNAL MEDICINE

## 2021-07-28 PROCEDURE — 1159F PR MEDICATION LIST DOCUMENTED IN MEDICAL RECORD: ICD-10-PCS | Mod: CPTII,S$GLB,, | Performed by: INTERNAL MEDICINE

## 2021-07-28 PROCEDURE — 99999 PR PBB SHADOW E&M-EST. PATIENT-LVL III: ICD-10-PCS | Mod: PBBFAC,,, | Performed by: INTERNAL MEDICINE

## 2021-07-28 PROCEDURE — 1159F MED LIST DOCD IN RCRD: CPT | Mod: CPTII,S$GLB,, | Performed by: INTERNAL MEDICINE

## 2021-07-28 PROCEDURE — 3008F BODY MASS INDEX DOCD: CPT | Mod: CPTII,S$GLB,, | Performed by: INTERNAL MEDICINE

## 2021-07-28 PROCEDURE — 1126F AMNT PAIN NOTED NONE PRSNT: CPT | Mod: CPTII,S$GLB,, | Performed by: INTERNAL MEDICINE

## 2021-07-28 PROCEDURE — 99214 OFFICE O/P EST MOD 30 MIN: CPT | Mod: S$GLB,,, | Performed by: INTERNAL MEDICINE

## 2021-07-28 PROCEDURE — 1126F PR PAIN SEVERITY QUANTIFIED, NO PAIN PRESENT: ICD-10-PCS | Mod: CPTII,S$GLB,, | Performed by: INTERNAL MEDICINE

## 2021-08-02 ENCOUNTER — TELEPHONE (OUTPATIENT)
Dept: CARDIOLOGY | Facility: CLINIC | Age: 61
End: 2021-08-02

## 2021-11-08 LAB — HBA1C MFR BLD: 7.8 % (ref 4.8–5.9)

## 2022-01-04 LAB — TOTAL PSA: 0.4

## 2022-01-19 ENCOUNTER — PATIENT OUTREACH (OUTPATIENT)
Dept: ADMINISTRATIVE | Facility: HOSPITAL | Age: 62
End: 2022-01-19
Payer: COMMERCIAL

## 2022-01-20 NOTE — TELEPHONE ENCOUNTER
1st check to see if patient has seen the results.  If not then  CALL patient with results and Document verification.  Schedule follow-up if needed.  [phone]  PSA is within normal limits.  Repeat annually.

## 2022-03-01 ENCOUNTER — OFFICE VISIT (OUTPATIENT)
Dept: CARDIOLOGY | Facility: CLINIC | Age: 62
End: 2022-03-01
Payer: COMMERCIAL

## 2022-03-01 VITALS
HEART RATE: 85 BPM | BODY MASS INDEX: 29.95 KG/M2 | SYSTOLIC BLOOD PRESSURE: 130 MMHG | DIASTOLIC BLOOD PRESSURE: 78 MMHG | OXYGEN SATURATION: 98 % | WEIGHT: 202.19 LBS | HEIGHT: 69 IN

## 2022-03-01 DIAGNOSIS — E11.59 HYPERTENSION ASSOCIATED WITH DIABETES: Chronic | ICD-10-CM

## 2022-03-01 DIAGNOSIS — E11.69 HYPERLIPIDEMIA ASSOCIATED WITH TYPE 2 DIABETES MELLITUS: Chronic | ICD-10-CM

## 2022-03-01 DIAGNOSIS — R00.2 PALPITATIONS: Primary | ICD-10-CM

## 2022-03-01 DIAGNOSIS — E11.65 TYPE 2 DIABETES MELLITUS WITH HYPERGLYCEMIA, WITHOUT LONG-TERM CURRENT USE OF INSULIN: Chronic | ICD-10-CM

## 2022-03-01 DIAGNOSIS — I15.2 HYPERTENSION ASSOCIATED WITH DIABETES: Chronic | ICD-10-CM

## 2022-03-01 DIAGNOSIS — E78.5 HYPERLIPIDEMIA ASSOCIATED WITH TYPE 2 DIABETES MELLITUS: Chronic | ICD-10-CM

## 2022-03-01 PROCEDURE — 1160F PR REVIEW ALL MEDS BY PRESCRIBER/CLIN PHARMACIST DOCUMENTED: ICD-10-PCS | Mod: CPTII,S$GLB,, | Performed by: INTERNAL MEDICINE

## 2022-03-01 PROCEDURE — 99214 PR OFFICE/OUTPT VISIT, EST, LEVL IV, 30-39 MIN: ICD-10-PCS | Mod: S$GLB,,, | Performed by: INTERNAL MEDICINE

## 2022-03-01 PROCEDURE — 1159F PR MEDICATION LIST DOCUMENTED IN MEDICAL RECORD: ICD-10-PCS | Mod: CPTII,S$GLB,, | Performed by: INTERNAL MEDICINE

## 2022-03-01 PROCEDURE — 4010F PR ACE/ARB THEARPY RXD/TAKEN: ICD-10-PCS | Mod: CPTII,S$GLB,, | Performed by: INTERNAL MEDICINE

## 2022-03-01 PROCEDURE — 3078F DIAST BP <80 MM HG: CPT | Mod: CPTII,S$GLB,, | Performed by: INTERNAL MEDICINE

## 2022-03-01 PROCEDURE — 3078F PR MOST RECENT DIASTOLIC BLOOD PRESSURE < 80 MM HG: ICD-10-PCS | Mod: CPTII,S$GLB,, | Performed by: INTERNAL MEDICINE

## 2022-03-01 PROCEDURE — 3051F PR MOST RECENT HEMOGLOBIN A1C LEVEL 7.0 - < 8.0%: ICD-10-PCS | Mod: CPTII,S$GLB,, | Performed by: INTERNAL MEDICINE

## 2022-03-01 PROCEDURE — 4010F ACE/ARB THERAPY RXD/TAKEN: CPT | Mod: CPTII,S$GLB,, | Performed by: INTERNAL MEDICINE

## 2022-03-01 PROCEDURE — 99999 PR PBB SHADOW E&M-EST. PATIENT-LVL IV: ICD-10-PCS | Mod: PBBFAC,,, | Performed by: INTERNAL MEDICINE

## 2022-03-01 PROCEDURE — 3075F SYST BP GE 130 - 139MM HG: CPT | Mod: CPTII,S$GLB,, | Performed by: INTERNAL MEDICINE

## 2022-03-01 PROCEDURE — 3008F BODY MASS INDEX DOCD: CPT | Mod: CPTII,S$GLB,, | Performed by: INTERNAL MEDICINE

## 2022-03-01 PROCEDURE — 3051F HG A1C>EQUAL 7.0%<8.0%: CPT | Mod: CPTII,S$GLB,, | Performed by: INTERNAL MEDICINE

## 2022-03-01 PROCEDURE — 3008F PR BODY MASS INDEX (BMI) DOCUMENTED: ICD-10-PCS | Mod: CPTII,S$GLB,, | Performed by: INTERNAL MEDICINE

## 2022-03-01 PROCEDURE — 3075F PR MOST RECENT SYSTOLIC BLOOD PRESS GE 130-139MM HG: ICD-10-PCS | Mod: CPTII,S$GLB,, | Performed by: INTERNAL MEDICINE

## 2022-03-01 PROCEDURE — 99214 OFFICE O/P EST MOD 30 MIN: CPT | Mod: S$GLB,,, | Performed by: INTERNAL MEDICINE

## 2022-03-01 PROCEDURE — 1160F RVW MEDS BY RX/DR IN RCRD: CPT | Mod: CPTII,S$GLB,, | Performed by: INTERNAL MEDICINE

## 2022-03-01 PROCEDURE — 1159F MED LIST DOCD IN RCRD: CPT | Mod: CPTII,S$GLB,, | Performed by: INTERNAL MEDICINE

## 2022-03-01 PROCEDURE — 99999 PR PBB SHADOW E&M-EST. PATIENT-LVL IV: CPT | Mod: PBBFAC,,, | Performed by: INTERNAL MEDICINE

## 2022-03-01 NOTE — PROGRESS NOTES
Subjective:   Patient ID:  Luis Lozano is a 61 y.o. male who presents for follow-up of Follow-up  Pt with Er visit ( [facility]) for CP. Negative w/u also. BP there 150s.80s  ER with musculoskeletal pain.  Last stress test 2017 normal. BP at home 130-140    Hypertension  This is a chronic problem. The current episode started more than 1 year ago. The problem has been gradually improving since onset. The problem is controlled. Pertinent negatives include no chest pain, palpitations or shortness of breath. Past treatments include ACE inhibitors and diuretics. The current treatment provides moderate improvement. There are no compliance problems.    Hyperlipidemia  This is a chronic problem. The current episode started more than 1 year ago. The problem is controlled. Pertinent negatives include no chest pain or shortness of breath. Current antihyperlipidemic treatment includes statins. The current treatment provides moderate improvement of lipids. There are no compliance problems.    Chest Pain   This is a new problem. The current episode started 1 to 4 weeks ago. The problem occurs intermittently. The problem has been waxing and waning. The pain is present in the substernal region. The pain is mild. The quality of the pain is described as dull. The pain does not radiate. Pertinent negatives include no dizziness, palpitations or shortness of breath. The treatment provided mild relief.   His past medical history is significant for hyperlipidemia.   Pertinent negatives for past medical history include no muscle weakness.       Review of Systems   Constitutional: Negative. Negative for weight gain.   HENT: Negative.    Eyes: Negative.    Cardiovascular: Negative.  Negative for chest pain, leg swelling and palpitations.   Respiratory: Negative.  Negative for shortness of breath.    Endocrine: Negative.    Hematologic/Lymphatic: Negative.    Skin: Negative.    Musculoskeletal: Negative for muscle weakness.    Gastrointestinal: Negative.    Genitourinary: Negative.    Neurological: Negative.  Negative for dizziness.   Psychiatric/Behavioral: Negative.    Allergic/Immunologic: Negative.      Family History   Problem Relation Age of Onset    Diabetes Mother             Heart disease Mother 63    Lung cancer Father     Hypertension Maternal Grandmother      Past Medical History:   Diagnosis Date    Diabetes mellitus, type 2     Hypertension     Spermatocele 9/3/2019     Social History     Socioeconomic History    Marital status:    Tobacco Use    Smoking status: Never Smoker    Smokeless tobacco: Never Used   Substance and Sexual Activity    Alcohol use: No    Drug use: No   Social History Narrative    ** Merged History Encounter **          Current Outpatient Medications on File Prior to Visit   Medication Sig Dispense Refill    albuterol (PROVENTIL/VENTOLIN HFA) 90 mcg/actuation inhaler Inhale 1-2 puffs into the lungs every 6 (six) hours as needed for Wheezing or Shortness of Breath. Rescue 6.7 g g    aspirin (ECOTRIN) 81 MG EC tablet Take 1 tablet (81 mg total) by mouth once daily.      atorvastatin (LIPITOR) 40 MG tablet Take 1 tablet (40 mg total) by mouth once daily. 90 tablet 3    azelastine (ASTELIN) 137 mcg (0.1 %) nasal spray 1 spray (137 mcg total) by Nasal route 2 (two) times daily. for 7 days 30 mL 0    azithromycin (Z-NICOLE) 250 MG tablet Take 1 tablet (250 mg total) by mouth once daily. Take first 2 tablets together, then 1 every day until finished. 6 tablet 0    fluticasone propionate (FLONASE) 50 mcg/actuation nasal spray 1 spray (50 mcg total) by Each Nostril route 2 (two) times daily as needed for Rhinitis. 15 g 0    gabapentin (NEURONTIN) 300 MG capsule Take 1 capsule (300 mg total) by mouth 3 (three) times daily. 270 capsule 3    glimepiride (AMARYL) 4 MG tablet Take 2 tablets (8 mg total) by mouth daily with breakfast. 180 tablet 3    hydroCHLOROthiazide (HYDRODIURIL)  25 MG tablet Take 1 tablet by mouth once daily 90 tablet 0    hydrocodone-chlorpheniramine (TUSSIONEX) 10-8 mg/5 mL suspension Take 5 mLs by mouth every 12 (twelve) hours as needed for Cough. 70 mL 0    lisinopriL (PRINIVIL,ZESTRIL) 5 MG tablet Take 1 tablet (5 mg total) by mouth once daily. 90 tablet 3    meclizine (ANTIVERT) 12.5 mg tablet TAKE 2 TABLETS BY MOUTH THREE TIMES DAILY AS NEEDED FOR DIZZINESS 30 tablet 12    metFORMIN (GLUCOPHAGE-XR) 500 MG ER 24hr tablet Take 2 tablets (1,000 mg total) by mouth 2 (two) times daily with meals. 360 tablet 3    mv-min-folic acid-lutein 200-137.5 mcg Chew Adult Multivitamin (w-lutein)   1 tablet by mouth daily      tamsulosin (FLOMAX) 0.4 mg Cap Take 1 capsule by mouth nightly.       No current facility-administered medications on file prior to visit.     Review of patient's allergies indicates:  No Known Allergies    Objective:     Physical Exam  Vitals and nursing note reviewed.   Constitutional:       Appearance: He is well-developed.   HENT:      Head: Normocephalic and atraumatic.   Eyes:      Conjunctiva/sclera: Conjunctivae normal.      Pupils: Pupils are equal, round, and reactive to light.   Cardiovascular:      Rate and Rhythm: Normal rate and regular rhythm.      Pulses: Intact distal pulses.      Heart sounds: Normal heart sounds.   Pulmonary:      Effort: Pulmonary effort is normal.      Breath sounds: Normal breath sounds.   Abdominal:      General: Bowel sounds are normal.      Palpations: Abdomen is soft.   Musculoskeletal:      Cervical back: Normal range of motion and neck supple.   Skin:     General: Skin is warm and dry.   Neurological:      Mental Status: He is alert and oriented to person, place, and time.         Assessment:     1. Palpitations    2. Hypertension associated with diabetes    3. Hyperlipidemia associated with type 2 diabetes mellitus    4. Type 2 diabetes mellitus with hyperglycemia, without long-term current use of insulin         Plan:     Palpitations    Hypertension associated with diabetes    Hyperlipidemia associated with type 2 diabetes mellitus    Type 2 diabetes mellitus with hyperglycemia, without long-term current use of insulin        Continue lisinopril, [id number]  Continue statin-hlp

## 2022-05-31 ENCOUNTER — PATIENT MESSAGE (OUTPATIENT)
Dept: FAMILY MEDICINE | Facility: CLINIC | Age: 62
End: 2022-05-31
Payer: COMMERCIAL

## 2022-06-20 ENCOUNTER — OFFICE VISIT (OUTPATIENT)
Dept: FAMILY MEDICINE | Facility: CLINIC | Age: 62
End: 2022-06-20
Payer: COMMERCIAL

## 2022-06-20 ENCOUNTER — LAB VISIT (OUTPATIENT)
Dept: LAB | Facility: HOSPITAL | Age: 62
End: 2022-06-20
Attending: FAMILY MEDICINE
Payer: COMMERCIAL

## 2022-06-20 VITALS
SYSTOLIC BLOOD PRESSURE: 128 MMHG | HEIGHT: 69 IN | WEIGHT: 205.88 LBS | TEMPERATURE: 98 F | BODY MASS INDEX: 30.49 KG/M2 | OXYGEN SATURATION: 95 % | DIASTOLIC BLOOD PRESSURE: 84 MMHG | HEART RATE: 67 BPM | RESPIRATION RATE: 16 BRPM

## 2022-06-20 DIAGNOSIS — Z00.01 ENCOUNTER FOR GENERAL ADULT MEDICAL EXAMINATION WITH ABNORMAL FINDINGS: ICD-10-CM

## 2022-06-20 DIAGNOSIS — R11.0 NAUSEA: ICD-10-CM

## 2022-06-20 DIAGNOSIS — Z79.899 ENCOUNTER FOR LONG-TERM (CURRENT) USE OF MEDICATIONS: ICD-10-CM

## 2022-06-20 DIAGNOSIS — E11.69 HYPERLIPIDEMIA ASSOCIATED WITH TYPE 2 DIABETES MELLITUS: ICD-10-CM

## 2022-06-20 DIAGNOSIS — E11.59 HYPERTENSION ASSOCIATED WITH DIABETES: ICD-10-CM

## 2022-06-20 DIAGNOSIS — E11.51 TYPE 2 DIABETES MELLITUS WITH DIABETIC PERIPHERAL ANGIOPATHY WITHOUT GANGRENE, WITHOUT LONG-TERM CURRENT USE OF INSULIN: ICD-10-CM

## 2022-06-20 DIAGNOSIS — I15.2 HYPERTENSION ASSOCIATED WITH DIABETES: ICD-10-CM

## 2022-06-20 DIAGNOSIS — E11.65 TYPE 2 DIABETES MELLITUS WITH HYPERGLYCEMIA, WITHOUT LONG-TERM CURRENT USE OF INSULIN: Primary | ICD-10-CM

## 2022-06-20 DIAGNOSIS — E78.5 HYPERLIPIDEMIA ASSOCIATED WITH TYPE 2 DIABETES MELLITUS: ICD-10-CM

## 2022-06-20 LAB
ALBUMIN SERPL BCP-MCNC: 4.1 G/DL (ref 3.5–5.2)
ALP SERPL-CCNC: 44 U/L (ref 55–135)
ALT SERPL W/O P-5'-P-CCNC: 19 U/L (ref 10–44)
ANION GAP SERPL CALC-SCNC: 12 MMOL/L (ref 8–16)
AST SERPL-CCNC: 17 U/L (ref 10–40)
BILIRUB SERPL-MCNC: 0.8 MG/DL (ref 0.1–1)
BUN SERPL-MCNC: 22 MG/DL (ref 8–23)
CALCIUM SERPL-MCNC: 10.5 MG/DL (ref 8.7–10.5)
CHLORIDE SERPL-SCNC: 101 MMOL/L (ref 95–110)
CO2 SERPL-SCNC: 29 MMOL/L (ref 23–29)
CREAT SERPL-MCNC: 1.1 MG/DL (ref 0.5–1.4)
EST. GFR  (AFRICAN AMERICAN): >60 ML/MIN/1.73 M^2
EST. GFR  (NON AFRICAN AMERICAN): >60 ML/MIN/1.73 M^2
ESTIMATED AVG GLUCOSE: 192 MG/DL (ref 68–131)
GLUCOSE SERPL-MCNC: 59 MG/DL (ref 70–110)
HBA1C MFR BLD: 8.3 % (ref 4–5.6)
POTASSIUM SERPL-SCNC: 4 MMOL/L (ref 3.5–5.1)
PROT SERPL-MCNC: 7.2 G/DL (ref 6–8.4)
SODIUM SERPL-SCNC: 142 MMOL/L (ref 136–145)

## 2022-06-20 PROCEDURE — 3061F PR NEG MICROALBUMINURIA RESULT DOCUMENTED/REVIEW: ICD-10-PCS | Mod: CPTII,S$GLB,, | Performed by: FAMILY MEDICINE

## 2022-06-20 PROCEDURE — 3074F SYST BP LT 130 MM HG: CPT | Mod: CPTII,S$GLB,, | Performed by: FAMILY MEDICINE

## 2022-06-20 PROCEDURE — 1159F PR MEDICATION LIST DOCUMENTED IN MEDICAL RECORD: ICD-10-PCS | Mod: CPTII,S$GLB,, | Performed by: FAMILY MEDICINE

## 2022-06-20 PROCEDURE — 99214 PR OFFICE/OUTPT VISIT, EST, LEVL IV, 30-39 MIN: ICD-10-PCS | Mod: S$GLB,,, | Performed by: FAMILY MEDICINE

## 2022-06-20 PROCEDURE — 1159F MED LIST DOCD IN RCRD: CPT | Mod: CPTII,S$GLB,, | Performed by: FAMILY MEDICINE

## 2022-06-20 PROCEDURE — 99999 PR PBB SHADOW E&M-EST. PATIENT-LVL V: ICD-10-PCS | Mod: PBBFAC,,, | Performed by: FAMILY MEDICINE

## 2022-06-20 PROCEDURE — 3052F HG A1C>EQUAL 8.0%<EQUAL 9.0%: CPT | Mod: CPTII,S$GLB,, | Performed by: FAMILY MEDICINE

## 2022-06-20 PROCEDURE — 3074F PR MOST RECENT SYSTOLIC BLOOD PRESSURE < 130 MM HG: ICD-10-PCS | Mod: CPTII,S$GLB,, | Performed by: FAMILY MEDICINE

## 2022-06-20 PROCEDURE — 99214 OFFICE O/P EST MOD 30 MIN: CPT | Mod: S$GLB,,, | Performed by: FAMILY MEDICINE

## 2022-06-20 PROCEDURE — 1160F PR REVIEW ALL MEDS BY PRESCRIBER/CLIN PHARMACIST DOCUMENTED: ICD-10-PCS | Mod: CPTII,S$GLB,, | Performed by: FAMILY MEDICINE

## 2022-06-20 PROCEDURE — 3066F NEPHROPATHY DOC TX: CPT | Mod: CPTII,S$GLB,, | Performed by: FAMILY MEDICINE

## 2022-06-20 PROCEDURE — 3079F PR MOST RECENT DIASTOLIC BLOOD PRESSURE 80-89 MM HG: ICD-10-PCS | Mod: CPTII,S$GLB,, | Performed by: FAMILY MEDICINE

## 2022-06-20 PROCEDURE — 4010F PR ACE/ARB THEARPY RXD/TAKEN: ICD-10-PCS | Mod: CPTII,S$GLB,, | Performed by: FAMILY MEDICINE

## 2022-06-20 PROCEDURE — 3079F DIAST BP 80-89 MM HG: CPT | Mod: CPTII,S$GLB,, | Performed by: FAMILY MEDICINE

## 2022-06-20 PROCEDURE — 1160F RVW MEDS BY RX/DR IN RCRD: CPT | Mod: CPTII,S$GLB,, | Performed by: FAMILY MEDICINE

## 2022-06-20 PROCEDURE — 3008F BODY MASS INDEX DOCD: CPT | Mod: CPTII,S$GLB,, | Performed by: FAMILY MEDICINE

## 2022-06-20 PROCEDURE — 36415 COLL VENOUS BLD VENIPUNCTURE: CPT | Mod: PO | Performed by: FAMILY MEDICINE

## 2022-06-20 PROCEDURE — 4010F ACE/ARB THERAPY RXD/TAKEN: CPT | Mod: CPTII,S$GLB,, | Performed by: FAMILY MEDICINE

## 2022-06-20 PROCEDURE — 99999 PR PBB SHADOW E&M-EST. PATIENT-LVL V: CPT | Mod: PBBFAC,,, | Performed by: FAMILY MEDICINE

## 2022-06-20 PROCEDURE — 3008F PR BODY MASS INDEX (BMI) DOCUMENTED: ICD-10-PCS | Mod: CPTII,S$GLB,, | Performed by: FAMILY MEDICINE

## 2022-06-20 PROCEDURE — 3061F NEG MICROALBUMINURIA REV: CPT | Mod: CPTII,S$GLB,, | Performed by: FAMILY MEDICINE

## 2022-06-20 PROCEDURE — 3066F PR DOCUMENTATION OF TREATMENT FOR NEPHROPATHY: ICD-10-PCS | Mod: CPTII,S$GLB,, | Performed by: FAMILY MEDICINE

## 2022-06-20 PROCEDURE — 83036 HEMOGLOBIN GLYCOSYLATED A1C: CPT | Performed by: FAMILY MEDICINE

## 2022-06-20 PROCEDURE — 3052F PR MOST RECENT HEMOGLOBIN A1C LEVEL 8.0 - < 9.0%: ICD-10-PCS | Mod: CPTII,S$GLB,, | Performed by: FAMILY MEDICINE

## 2022-06-20 PROCEDURE — 80053 COMPREHEN METABOLIC PANEL: CPT | Performed by: FAMILY MEDICINE

## 2022-06-20 RX ORDER — METFORMIN HYDROCHLORIDE 500 MG/1
1000 TABLET, EXTENDED RELEASE ORAL 2 TIMES DAILY WITH MEALS
Qty: 360 TABLET | Refills: 4 | Status: SHIPPED | OUTPATIENT
Start: 2022-06-20 | End: 2022-08-01 | Stop reason: SDUPTHER

## 2022-06-20 RX ORDER — SEMAGLUTIDE 1.34 MG/ML
INJECTION, SOLUTION SUBCUTANEOUS
Qty: 1 PEN | Refills: 0 | Status: SHIPPED | OUTPATIENT
Start: 2022-06-20 | End: 2022-08-19

## 2022-06-20 RX ORDER — ONDANSETRON 8 MG/1
8 TABLET, ORALLY DISINTEGRATING ORAL EVERY 6 HOURS PRN
Qty: 30 TABLET | Refills: 0 | Status: SHIPPED | OUTPATIENT
Start: 2022-06-20 | End: 2022-07-20

## 2022-06-20 RX ORDER — GLIMEPIRIDE 4 MG/1
8 TABLET ORAL
Qty: 180 TABLET | Refills: 4 | Status: SHIPPED | OUTPATIENT
Start: 2022-06-20 | End: 2023-11-27 | Stop reason: SDUPTHER

## 2022-06-20 RX ORDER — ATORVASTATIN CALCIUM 40 MG/1
40 TABLET, FILM COATED ORAL DAILY
Qty: 90 TABLET | Refills: 4 | Status: SHIPPED | OUTPATIENT
Start: 2022-06-20 | End: 2023-11-28

## 2022-06-20 RX ORDER — LISINOPRIL 5 MG/1
5 TABLET ORAL DAILY
Qty: 90 TABLET | Refills: 4 | Status: SHIPPED | OUTPATIENT
Start: 2022-06-20 | End: 2022-08-30 | Stop reason: SDUPTHER

## 2022-06-20 RX ORDER — HYDROCHLOROTHIAZIDE 25 MG/1
25 TABLET ORAL DAILY
Qty: 90 TABLET | Refills: 4 | Status: SHIPPED | OUTPATIENT
Start: 2022-06-20 | End: 2023-10-18

## 2022-06-20 NOTE — PROGRESS NOTES
PLAN:      Problem List Items Addressed This Visit     Hypertension associated with diabetes (Chronic)     Continue lisinopril and hydrochlorothiazide.Counseled on importance of hypertension disease course, I recommend ongoing Education for DASH-diet and exercise.  Counseled on medication regimen importance of treating high blood pressure.  Please be advised of risk of untreated blood pressure as discussed.  Please advised of ER precautions were given for symptoms of hypertensive urgency and emergency.             Relevant Medications    glimepiride (AMARYL) 4 MG tablet    hydroCHLOROthiazide (HYDRODIURIL) 25 MG tablet    metFORMIN (GLUCOPHAGE-XR) 500 MG ER 24hr tablet    semaglutide (OZEMPIC) 0.25 mg or 0.5 mg(2 mg/1.5 mL) pen injector    Hyperlipidemia (Chronic)     I recommend targeting LDL less than 100 with his history of diabetes preferably less than 70 for optimal control.  Consider increasing atorvastatin to 80 milligrams if no improvement on next fasting lipid panel.    Counseled on hyperlipidemia disease course, healthy diet and increased need for exercise.  Please be advised of the risk of cardiovascular disease, increase stroke and heart attack risk with uncontrolled/untreated hyperlipidemia.     Patient voiced understanding and understood the treatment plan. All questions were answered.                Relevant Medications    atorvastatin (LIPITOR) 40 MG tablet    Type 2 diabetes mellitus with hyperglycemia, without long-term current use of insulin - Primary (Chronic)     Current plan is to start Ozempic and titrate up to 1 milligram as tolerated.  I have discussed with the patient about stopping glimepiride 1st once he gets up on the higher dosage of Ozempic to avoid hypoglycemia.  Patient will follow-up sooner if having low blood sugar. We will plan to monitor hemoglobin A1c at designated intervals 3 to 6 months.  I recommend ongoing Education for diabetic diet and exercise protocol.  We will continue  to monitor for side effects.    Please be advised of symptoms to monitor for and to notify me immediately if persistent or worsening.  Follow up with Ophthalmology/Optometry and Podiatry at least annually.             Relevant Medications    glimepiride (AMARYL) 4 MG tablet    metFORMIN (GLUCOPHAGE-XR) 500 MG ER 24hr tablet    semaglutide (OZEMPIC) 0.25 mg or 0.5 mg(2 mg/1.5 mL) pen injector    Other Relevant Orders    Microalbumin/Creatinine Ratio, Urine    Encounter for long-term (current) use of medications (Chronic)     Complete history and physical was completed today.  Complete and thorough medication reconciliation was performed.  Discussed risks and benefits of medications.  Advised patient on orders and health maintenance.  We discussed old records and old labs if available.  Will request any records not available through epic.  Continue current medications listed on your summary sheet.             Relevant Medications    atorvastatin (LIPITOR) 40 MG tablet    glimepiride (AMARYL) 4 MG tablet    hydroCHLOROthiazide (HYDRODIURIL) 25 MG tablet    lisinopriL (PRINIVIL,ZESTRIL) 5 MG tablet    metFORMIN (GLUCOPHAGE-XR) 500 MG ER 24hr tablet    Type 2 diabetes mellitus with diabetic peripheral angiopathy without gangrene, without long-term current use of insulin (Chronic)     Continue follow-up with pain management.  Requesting records.           Relevant Medications    glimepiride (AMARYL) 4 MG tablet    metFORMIN (GLUCOPHAGE-XR) 500 MG ER 24hr tablet    semaglutide (OZEMPIC) 0.25 mg or 0.5 mg(2 mg/1.5 mL) pen injector    Nausea     Zofran has been sent to the pharmacy as a precaution in case patient has any nausea with starting Ozempic.           Relevant Medications    ondansetron (ZOFRAN-ODT) 8 MG TbDL        Future Appointments     Date Provider Specialty Appt Notes    8/30/2022 Jovani Alba MD Cardiology 1 year f/u    12/20/2022 Charles Nash MD Family Medicine 6month         Medication  Management for assessment above:   Medication List with Changes/Refills   New Medications    ONDANSETRON (ZOFRAN-ODT) 8 MG TBDL    Take 1 tablet (8 mg total) by mouth every 6 (six) hours as needed.    SEMAGLUTIDE (OZEMPIC) 0.25 MG OR 0.5 MG(2 MG/1.5 ML) PEN INJECTOR    Inject 0.25 mg into the skin once a week for 30 days, THEN 0.5 mg once a week.   Current Medications    ASPIRIN (ECOTRIN) 81 MG EC TABLET    Take 1 tablet (81 mg total) by mouth once daily.    GABAPENTIN (NEURONTIN) 300 MG CAPSULE    Take 1 capsule (300 mg total) by mouth 3 (three) times daily.    HYDROCODONE-CHLORPHENIRAMINE (TUSSIONEX) 10-8 MG/5 ML SUSPENSION    Take 5 mLs by mouth every 12 (twelve) hours as needed for Cough.    MECLIZINE (ANTIVERT) 12.5 MG TABLET    TAKE 2 TABLETS BY MOUTH THREE TIMES DAILY AS NEEDED FOR DIZZINESS    MV-MIN-FOLIC ACID-LUTEIN 200-137.5 MCG CHEW    Adult Multivitamin (w-lutein)   1 tablet by mouth daily    TAMSULOSIN (FLOMAX) 0.4 MG CAP    Take 1 capsule by mouth nightly.   Changed and/or Refilled Medications    Modified Medication Previous Medication    ATORVASTATIN (LIPITOR) 40 MG TABLET atorvastatin (LIPITOR) 40 MG tablet       Take 1 tablet (40 mg total) by mouth once daily.    Take 1 tablet (40 mg total) by mouth once daily.    GLIMEPIRIDE (AMARYL) 4 MG TABLET glimepiride (AMARYL) 4 MG tablet       Take 2 tablets (8 mg total) by mouth daily with breakfast.    Take 2 tablets (8 mg total) by mouth daily with breakfast.    HYDROCHLOROTHIAZIDE (HYDRODIURIL) 25 MG TABLET hydroCHLOROthiazide (HYDRODIURIL) 25 MG tablet       Take 1 tablet (25 mg total) by mouth once daily.    Take 1 tablet by mouth once daily    LISINOPRIL (PRINIVIL,ZESTRIL) 5 MG TABLET lisinopriL (PRINIVIL,ZESTRIL) 5 MG tablet       Take 1 tablet (5 mg total) by mouth once daily.    Take 1 tablet (5 mg total) by mouth once daily.    METFORMIN (GLUCOPHAGE-XR) 500 MG ER 24HR TABLET metFORMIN (GLUCOPHAGE-XR) 500 MG ER 24hr tablet       Take 2 tablets  (1,000 mg total) by mouth 2 (two) times daily with meals.    Take 2 tablets (1,000 mg total) by mouth 2 (two) times daily with meals.   Discontinued Medications    ALBUTEROL (PROVENTIL/VENTOLIN HFA) 90 MCG/ACTUATION INHALER    Inhale 1-2 puffs into the lungs every 6 (six) hours as needed for Wheezing or Shortness of Breath. Rescue    AZELASTINE (ASTELIN) 137 MCG (0.1 %) NASAL SPRAY    1 spray (137 mcg total) by Nasal route 2 (two) times daily. for 7 days    AZITHROMYCIN (Z-NICOLE) 250 MG TABLET    Take 1 tablet (250 mg total) by mouth once daily. Take first 2 tablets together, then 1 every day until finished.    FLUTICASONE PROPIONATE (FLONASE) 50 MCG/ACTUATION NASAL SPRAY    1 spray (50 mcg total) by Each Nostril route 2 (two) times daily as needed for Rhinitis.       Charles Nash M.D.  ==========================================================================  Subjective:   Patient ID: Luis Lozano is a 62 y.o. male.  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   Chief Complaint: Hypertension, Hyperlipidemia, Diabetes, and Medication Refill      Problem List Items Addressed This Visit     Hypertension associated with diabetes (Chronic)    Overview     June 2022:  Patient has been lost to follow-up since December of 2020. Blood pressure remains controlled at this time on current medication regimen.    December 2020:  Blood pressure is well controlled today.  Patient reports compliance with medication regimen.  SEPTEMBER 2020:  Patient with recent elevations in his blood pressures.  Recently went to ER for chest pain but was given medication for reflux that resolve the pain.  Patient has been doing well since then.  CHRONIC. STABLE. BP Reviewed.  Compliant with BP medications. No SE reported.   (-) CP, SOB, palpitations, dizziness, lightheadedness, HA, arm numbness, tingling or weakness, syncope.  Creatinine   Date Value Ref Range Status   06/20/2022 1.1  0.5 - 1.4 mg/dL Final     Initial HPI:  Chronic.  Stable.  Patient taking hydrochlorothiazide 12.5 mg amlodipine 2.5 mg.  Reports compliance.  No side effects reported.  No symptoms of chest pain shortness of breath blurry vision etc.  November 2019:  Patient reports that he did have episode of vertigo and went to the ER but was given meclizine and Tessalon Perles for the cough.  Has not been having issues since then.CHRONIC. STABLE. BP Reviewed.  Compliant with BP medications. No SE reported.              Current Assessment & Plan     Continue lisinopril and hydrochlorothiazide.Counseled on importance of hypertension disease course, I recommend ongoing Education for DASH-diet and exercise.  Counseled on medication regimen importance of treating high blood pressure.  Please be advised of risk of untreated blood pressure as discussed.  Please advised of ER precautions were given for symptoms of hypertensive urgency and emergency.             Hyperlipidemia (Chronic)    Overview     CHRONIC. STABLE. Lab analysis reviewed.   (-) CP, SOB, abdominal pain, N/V/D, constipation, jaundice, skin changes.  (-) Myalgias  Lab Results   Component Value Date    CHOL 193 11/08/2021    CHOL 176 07/15/2021    CHOL 160 11/22/2019     Lab Results   Component Value Date    HDL 64 (H) 11/08/2021    HDL 49 07/15/2021    HDL 53 11/22/2019     Lab Results   Component Value Date    LDLCALC 114 11/08/2021    LDLCALC 112 07/15/2021    LDLCALC 89.0 11/22/2019     Lab Results   Component Value Date    TRIG 75 11/08/2021    TRIG 76 07/15/2021    TRIG 90 11/22/2019     Lab Results   Component Value Date    CHOLHDL 3.02 11/08/2021    CHOLHDL 3.59 07/15/2021    CHOLHDL 33.1 11/22/2019     Lab Results   Component Value Date    TOTALCHOLEST 3.0 11/22/2019    TOTALCHOLEST 3.2 08/05/2019    TOTALCHOLEST 4.0 06/03/2017     Lab Results   Component Value Date    ALT 19 06/20/2022    AST 17 06/20/2022    ALKPHOS 44 (L) 06/20/2022    BILITOT 0.8 06/20/2022      ======================================================  The ASCVD Risk score (Shana LÓPEZ Jr., et al., 2013) failed to calculate for the following reasons:    The patient has a prior MI or stroke diagnosis             Current Assessment & Plan     I recommend targeting LDL less than 100 with his history of diabetes preferably less than 70 for optimal control.  Consider increasing atorvastatin to 80 milligrams if no improvement on next fasting lipid panel.    Counseled on hyperlipidemia disease course, healthy diet and increased need for exercise.  Please be advised of the risk of cardiovascular disease, increase stroke and heart attack risk with uncontrolled/untreated hyperlipidemia.     Patient voiced understanding and understood the treatment plan. All questions were answered.                Type 2 diabetes mellitus with hyperglycemia, without long-term current use of insulin - Primary (Chronic)    Overview     June 2022:  Patient has been lost to follow-up since December of 2020. Today we had a long discussion about new or medications such as Ozempic try to get him off of some of the oral medication and avoid progression to insulin.  Patient and his wife were in agreement with trying this medication.  He denies any history of pancreatitis, thyroid cancer, MEN syndrome.  November 2019:  ReviewedDiabetes Management StatusStatin: TakingACE/ARB: Not taking  MARCH 2020:  Reviewed Diabetes Management StatusStatin: TakingACE/ARB: Taking  SEPTEMBER 2020:  Reviewed Diabetes Management Status Statin: TakingACE/ARB: Taking  December 2020:  Reviewed Diabetes Management StatusStatin: TakingACE/ARB: Taking patient admits to diet noncompliance and medication not being taken as prescribed.  Patient reports that he decrease the dosage of metformin on his own.  Diabetes Management Status    Statin: Taking  ACE/ARB: Taking    Screening or Prevention Patient's value Goal Complete/Controlled?   HgA1C Testing and Control   Lab Results    Component Value Date    HGBA1C 8.3 (H) 06/20/2022      Annually/Less than 8% No   Lipid profile : 11/08/2021 Annually No   LDL control Lab Results   Component Value Date    LDLCALC 114 11/08/2021    Annually/Less than 100 mg/dl  No   Nephropathy screening Lab Results   Component Value Date    LABMICR 6.0 06/20/2022     Lab Results   Component Value Date    PROTEINUA Negative 05/14/2021     No results found for: UTPCR   Annually Yes   Blood pressure BP Readings from Last 1 Encounters:   06/20/22 128/84    Less than 140/90 Yes   Dilated retinal exam : 09/07/2021 Annually Yes   Foot exam   : 09/09/2020 Annually No                Current Assessment & Plan     Current plan is to start Ozempic and titrate up to 1 milligram as tolerated.  I have discussed with the patient about stopping glimepiride 1st once he gets up on the higher dosage of Ozempic to avoid hypoglycemia.  Patient will follow-up sooner if having low blood sugar. We will plan to monitor hemoglobin A1c at designated intervals 3 to 6 months.  I recommend ongoing Education for diabetic diet and exercise protocol.  We will continue to monitor for side effects.    Please be advised of symptoms to monitor for and to notify me immediately if persistent or worsening.  Follow up with Ophthalmology/Optometry and Podiatry at least annually.             Encounter for long-term (current) use of medications (Chronic)    Overview     08/06/2019 : CHRONIC long-term drug therapy for managed conditions. See medication list. Reports compliance.  No side effects reported.  Routine lab work is being monitored.  Patient does  need refills today.   November 2019:  CHRONIC. Stable. Compliant with medications for managed conditions. See medication list. No SE reported. Routine lab analysis is being monitored. Refills were addressed.  SEPTEMBER 2020:  Reviewed labs.  CHRONIC. Stable. Compliant with medications for managed conditions. See medication list. No SE reported. Routine  lab analysis is being monitored. Refills were addressed.  DECEMBER 2020:  Reviewed labs.  CHRONIC. Stable. Compliant with medications for managed conditions. See medication list. No SE reported. Routine lab analysis is being monitored. Refills were addressed.  June 2022:  Reviewed labs.  Lab Results   Component Value Date    WBC 6.78 02/04/2022    HGB 15.7 02/04/2022    HCT 47.4 02/04/2022    MCV 88 02/04/2022     02/04/2022         Chemistry        Component Value Date/Time     06/20/2022 1359    K 4.0 06/20/2022 1359     06/20/2022 1359    CO2 29 06/20/2022 1359    BUN 22 06/20/2022 1359    CREATININE 1.1 06/20/2022 1359    GLU 59 (L) 06/20/2022 1359        Component Value Date/Time    CALCIUM 10.5 06/20/2022 1359    ALKPHOS 44 (L) 06/20/2022 1359    AST 17 06/20/2022 1359    ALT 19 06/20/2022 1359    BILITOT 0.8 06/20/2022 1359    ESTGFRAFRICA >60.0 06/20/2022 1359    EGFRNONAA >60.0 06/20/2022 1359          Lab Results   Component Value Date    TSH 0.796 08/05/2019    X6AYYGL 7.2 08/05/2019    T3FREE 2.6 08/05/2019       =================================================           Current Assessment & Plan     Complete history and physical was completed today.  Complete and thorough medication reconciliation was performed.  Discussed risks and benefits of medications.  Advised patient on orders and health maintenance.  We discussed old records and old labs if available.  Will request any records not available through epic.  Continue current medications listed on your summary sheet.             Type 2 diabetes mellitus with diabetic peripheral angiopathy without gangrene, without long-term current use of insulin (Chronic)    Overview     See other diabetes problem.  Patient does follow with pain management.  He is on gabapentin 300 , three times daily           Current Assessment & Plan     Continue follow-up with pain management.  Requesting records.           Nausea    Overview     Patient  needing a refill on Zofran           Current Assessment & Plan     Zofran has been sent to the pharmacy as a precaution in case patient has any nausea with starting Ozempic.                  Review of patient's allergies indicates:  No Known Allergies  Current Outpatient Medications   Medication Instructions    aspirin (ECOTRIN) 81 mg, Oral, Daily    atorvastatin (LIPITOR) 40 mg, Oral, Daily    gabapentin (NEURONTIN) 300 mg, Oral, 3 times daily    glimepiride (AMARYL) 8 mg, Oral, With breakfast    hydroCHLOROthiazide (HYDRODIURIL) 25 mg, Oral, Daily    hydrocodone-chlorpheniramine (TUSSIONEX) 10-8 mg/5 mL suspension 5 mLs, Oral, Every 12 hours PRN    lisinopriL (PRINIVIL,ZESTRIL) 5 mg, Oral, Daily    meclizine (ANTIVERT) 12.5 mg tablet TAKE 2 TABLETS BY MOUTH THREE TIMES DAILY AS NEEDED FOR DIZZINESS    metFORMIN (GLUCOPHAGE-XR) 1,000 mg, Oral, 2 times daily with meals    mv-min-folic acid-lutein 200-137.5 mcg Chew Adult Multivitamin (w-lutein)   1 tablet by mouth daily    ondansetron (ZOFRAN-ODT) 8 mg, Oral, Every 6 hours PRN    semaglutide (OZEMPIC) 0.25 mg or 0.5 mg(2 mg/1.5 mL) pen injector Inject 0.25 mg into the skin once a week for 30 days, THEN 0.5 mg once a week.    tamsulosin (FLOMAX) 0.4 mg Cap 1 capsule, Oral, Nightly      I have reviewed the PMH, social history, FamilyHx, surgical history, allergies and medications documented / confirmed by the patient at the time of this visit.  Review of Systems   Constitutional: Negative for chills, fatigue, fever and unexpected weight change.   HENT: Negative for ear pain and sore throat.    Eyes: Negative for redness and visual disturbance.   Respiratory: Negative for cough and shortness of breath.    Cardiovascular: Negative for chest pain and palpitations.   Gastrointestinal: Negative for nausea and vomiting.   Endocrine: Negative for cold intolerance and heat intolerance.   Genitourinary: Negative for difficulty urinating and hematuria.  "  Musculoskeletal: Positive for arthralgias. Negative for myalgias.   Skin: Negative for rash and wound.   Allergic/Immunologic: Negative for environmental allergies and food allergies.   Neurological: Negative for weakness and headaches.   Hematological: Negative for adenopathy. Does not bruise/bleed easily.   Psychiatric/Behavioral: Negative for sleep disturbance. The patient is not nervous/anxious.      Objective:   /84   Pulse 67   Temp 97.5 °F (36.4 °C) (Temporal)   Resp 16   Ht 5' 9" (1.753 m)   Wt 93.4 kg (205 lb 14.4 oz)   SpO2 95%   BMI 30.41 kg/m²   Physical Exam  Vitals and nursing note reviewed.   Constitutional:       General: He is not in acute distress.     Appearance: He is well-developed. He is not diaphoretic.   HENT:      Head: Normocephalic and atraumatic.      Right Ear: External ear normal.      Left Ear: External ear normal.      Nose: Nose normal. No rhinorrhea.   Eyes:      Extraocular Movements: Extraocular movements intact.      Pupils: Pupils are equal, round, and reactive to light.   Cardiovascular:      Rate and Rhythm: Normal rate.      Pulses: Normal pulses.   Pulmonary:      Effort: Pulmonary effort is normal. No respiratory distress.      Breath sounds: Normal breath sounds.   Abdominal:      General: Bowel sounds are normal.      Palpations: Abdomen is soft.   Musculoskeletal:         General: Normal range of motion.      Cervical back: Normal range of motion and neck supple.   Skin:     General: Skin is warm and dry.      Capillary Refill: Capillary refill takes less than 2 seconds.      Findings: No rash.   Neurological:      General: No focal deficit present.      Mental Status: He is alert and oriented to person, place, and time. Mental status is at baseline.      Cranial Nerves: No cranial nerve deficit.      Motor: No weakness.      Gait: Gait normal.   Psychiatric:         Attention and Perception: He is attentive.         Mood and Affect: Mood normal. Mood is " not anxious or depressed. Affect is not labile, blunt, angry or inappropriate.         Speech: He is communicative. Speech is not rapid and pressured, delayed, slurred or tangential.         Behavior: Behavior normal. Behavior is not agitated, slowed, aggressive, withdrawn, hyperactive or combative.         Thought Content: Thought content normal. Thought content is not paranoid or delusional. Thought content does not include homicidal or suicidal ideation. Thought content does not include homicidal or suicidal plan.         Cognition and Memory: Memory is not impaired.         Judgment: Judgment normal. Judgment is not impulsive or inappropriate.         Assessment:     1. Type 2 diabetes mellitus with hyperglycemia, without long-term current use of insulin    2. Hyperlipidemia associated with type 2 diabetes mellitus    3. Encounter for long-term (current) use of medications    4. Hypertension associated with diabetes    5. Nausea    6. Type 2 diabetes mellitus with diabetic peripheral angiopathy without gangrene, without long-term current use of insulin      MDM:   Moderate complexity.  Moderate risk.  Total time:32 minutes.  This includes total time spent on the encounter, which includes face to face time and non-face to face time preparing to see the patient (eg, review of previous medical records, tests), Obtaining and/or reviewing separately obtained history, documenting clinical information in the electronic or other health record, independently interpreting results (not separately reported)/communicating results to the patient/family/caregiver, and/or care coordination (not separately reported).    I have Reviewed and summarized old records.  I have performed thorough medication reconciliation today and discussed risk and benefits of medications.  I have reviewed labs and discussed with patient.  All questions were answered.  I am requesting old records and will review them once they are available.   Podiatry Saurav Tellez  Cardiology Dr. Alba   Adv Neeru Augustine      I have signed for the following orders AND/OR meds.  Orders Placed This Encounter   Procedures    Microalbumin/Creatinine Ratio, Urine     Standing Status:   Future     Standing Expiration Date:   8/19/2023     Order Specific Question:   Specimen Source     Answer:   Urine     Medications Ordered This Encounter   Medications    atorvastatin (LIPITOR) 40 MG tablet     Sig: Take 1 tablet (40 mg total) by mouth once daily.     Dispense:  90 tablet     Refill:  4    glimepiride (AMARYL) 4 MG tablet     Sig: Take 2 tablets (8 mg total) by mouth daily with breakfast.     Dispense:  180 tablet     Refill:  4    hydroCHLOROthiazide (HYDRODIURIL) 25 MG tablet     Sig: Take 1 tablet (25 mg total) by mouth once daily.     Dispense:  90 tablet     Refill:  4     .    lisinopriL (PRINIVIL,ZESTRIL) 5 MG tablet     Sig: Take 1 tablet (5 mg total) by mouth once daily.     Dispense:  90 tablet     Refill:  4     .    metFORMIN (GLUCOPHAGE-XR) 500 MG ER 24hr tablet     Sig: Take 2 tablets (1,000 mg total) by mouth 2 (two) times daily with meals.     Dispense:  360 tablet     Refill:  4    ondansetron (ZOFRAN-ODT) 8 MG TbDL     Sig: Take 1 tablet (8 mg total) by mouth every 6 (six) hours as needed.     Dispense:  30 tablet     Refill:  0    semaglutide (OZEMPIC) 0.25 mg or 0.5 mg(2 mg/1.5 mL) pen injector     Sig: Inject 0.25 mg into the skin once a week for 30 days, THEN 0.5 mg once a week.     Dispense:  1 pen     Refill:  0        Follow up in about 6 months (around 12/20/2022), or if symptoms worsen or fail to improve, for annual.  Future Appointments     Date Provider Specialty Appt Notes    8/30/2022 Jovani Alba MD Cardiology 1 year f/u    12/20/2022 Charles Nash MD Family Medicine 6month        If no improvement in symptoms or symptoms worsen, advised to call/follow-up at clinic or go to ER. Patient voiced understanding and  all questions/concerns were addressed.   DISCLAIMER: This note was compiled by using a speech recognition dictation system and therefore please be aware that typographical / speech recognition errors can and do occur.  Please contact me if you see any errors specifically.    Charles Nash M.D.       Office: [phone] [id number] [place] [address]  FAX: [phone]

## 2022-06-20 NOTE — PATIENT INSTRUCTIONS
Follow up in about 6 months (around 12/20/2022), or if symptoms worsen or fail to improve, for annual.     Dear patient,   As a result of recent federal legislation (The Federal Cures Act), you may receive lab or pathology results from your visit in your [facility] account before your physician is able to contact you. Your physician or their representative will relay the results to you with their recommendations at their soonest availability.     If no improvement in symptoms or symptoms worsen, please be advised to call MD, follow-up at clinic and/or go to ER if becomes severe.    Charles Nash M.D.        We Offer TELEHEALTH & Same Day Appointments!   Book your Telehealth appointment with me through my nurse or   Clinic appointments on [employer]!    [address] [place] [address]    Office: [phone]   FAX: [phone]    Check out my Facebook Page and Follow Me at: [email or web address]    Check out my website at [employer] by clicking on: [email or web address]    To Schedule appointments online, go to [employer]: [email or web address]

## 2022-06-21 PROBLEM — E11.51 TYPE 2 DIABETES MELLITUS WITH DIABETIC PERIPHERAL ANGIOPATHY WITHOUT GANGRENE, WITHOUT LONG-TERM CURRENT USE OF INSULIN: Status: ACTIVE | Noted: 2022-06-21

## 2022-06-21 PROBLEM — N40.0 BENIGN PROSTATIC HYPERPLASIA: Status: ACTIVE | Noted: 2021-02-11

## 2022-06-21 PROBLEM — R11.0 NAUSEA: Status: ACTIVE | Noted: 2022-06-21

## 2022-06-21 PROBLEM — G62.9 NEUROPATHY: Status: ACTIVE | Noted: 2021-02-11

## 2022-06-21 PROBLEM — M72.2 BILATERAL PLANTAR FASCIITIS: Status: ACTIVE | Noted: 2021-11-11

## 2022-06-21 PROBLEM — K21.9 GASTROESOPHAGEAL REFLUX DISEASE WITHOUT ESOPHAGITIS: Status: ACTIVE | Noted: 2021-02-11

## 2022-06-21 PROBLEM — E11.51 TYPE 2 DIABETES MELLITUS WITH DIABETIC PERIPHERAL ANGIOPATHY WITHOUT GANGRENE, WITHOUT LONG-TERM CURRENT USE OF INSULIN: Chronic | Status: ACTIVE | Noted: 2022-06-21

## 2022-06-21 NOTE — ASSESSMENT & PLAN NOTE
Current plan is to start Ozempic and titrate up to 1 milligram as tolerated.  I have discussed with the patient about stopping glimepiride 1st once he gets up on the higher dosage of Ozempic to avoid hypoglycemia.  Patient will follow-up sooner if having low blood sugar. We will plan to monitor hemoglobin A1c at designated intervals 3 to 6 months.  I recommend ongoing Education for diabetic diet and exercise protocol.  We will continue to monitor for side effects.    Please be advised of symptoms to monitor for and to notify me immediately if persistent or worsening.  Follow up with Ophthalmology/Optometry and Podiatry at least annually.

## 2022-06-21 NOTE — ASSESSMENT & PLAN NOTE
I recommend targeting LDL less than 100 with his history of diabetes preferably less than 70 for optimal control.  Consider increasing atorvastatin to 80 milligrams if no improvement on next fasting lipid panel.    Counseled on hyperlipidemia disease course, healthy diet and increased need for exercise.  Please be advised of the risk of cardiovascular disease, increase stroke and heart attack risk with uncontrolled/untreated hyperlipidemia.     Patient voiced understanding and understood the treatment plan. All questions were answered.

## 2022-06-21 NOTE — ASSESSMENT & PLAN NOTE
Zofran has been sent to the pharmacy as a precaution in case patient has any nausea with starting Ozempic.

## 2022-06-21 NOTE — PROGRESS NOTES
Make follow-up lab appointment per recommendation below.  Check to see if patient has seen the results through my chart.  If not then,  #CALL THE PATIENT# to discuss results/see if they have questions and document verification of contact. Make F/U appt if needed. [phone]    #My interpretation that was sent to them through [employer]:  Luis, I have reviewed your recent blood work.     Your metabolic panel which shows your glucose, kidney function, electrolytes, and liver function is stable.   Your hemoglobin A1c is elevated.  I recommend that you start Ozempic as we discussed at office visit.  Plan to stop the glimepiride if blood sugar starts to drop too low.  Recheck A1c in three months.  This test is gold standard screening test for diabetes.  It is a measures 3 months of your average blood sugar.  =========================  Also please address any outstanding health maintenance that may be due: TETANUS VACCINE Never done  Shingles Vaccine(1 of 2) Never done  Foot Exam due on 09/09/2021  COVID-19 Vaccine(4 - Booster for Pfizer series) due on 03/26/2022

## 2022-06-21 NOTE — ASSESSMENT & PLAN NOTE
Continue lisinopril and hydrochlorothiazide.Counseled on importance of hypertension disease course, I recommend ongoing Education for DASH-diet and exercise.  Counseled on medication regimen importance of treating high blood pressure.  Please be advised of risk of untreated blood pressure as discussed.  Please advised of ER precautions were given for symptoms of hypertensive urgency and emergency.

## 2022-07-11 ENCOUNTER — PATIENT OUTREACH (OUTPATIENT)
Dept: ADMINISTRATIVE | Facility: HOSPITAL | Age: 62
End: 2022-07-11
Payer: COMMERCIAL

## 2022-08-30 ENCOUNTER — OFFICE VISIT (OUTPATIENT)
Dept: CARDIOLOGY | Facility: CLINIC | Age: 62
End: 2022-08-30
Payer: COMMERCIAL

## 2022-08-30 VITALS
SYSTOLIC BLOOD PRESSURE: 120 MMHG | OXYGEN SATURATION: 98 % | HEIGHT: 69 IN | BODY MASS INDEX: 30.16 KG/M2 | WEIGHT: 203.63 LBS | DIASTOLIC BLOOD PRESSURE: 66 MMHG | HEART RATE: 90 BPM

## 2022-08-30 DIAGNOSIS — E11.59 HYPERTENSION ASSOCIATED WITH DIABETES: Primary | Chronic | ICD-10-CM

## 2022-08-30 DIAGNOSIS — E11.69 HYPERLIPIDEMIA ASSOCIATED WITH TYPE 2 DIABETES MELLITUS: Chronic | ICD-10-CM

## 2022-08-30 DIAGNOSIS — I15.2 HYPERTENSION ASSOCIATED WITH DIABETES: Primary | Chronic | ICD-10-CM

## 2022-08-30 DIAGNOSIS — R00.2 PALPITATIONS: ICD-10-CM

## 2022-08-30 DIAGNOSIS — Z79.899 ENCOUNTER FOR LONG-TERM (CURRENT) USE OF MEDICATIONS: ICD-10-CM

## 2022-08-30 DIAGNOSIS — E78.00 PURE HYPERCHOLESTEROLEMIA: Chronic | ICD-10-CM

## 2022-08-30 DIAGNOSIS — E78.5 HYPERLIPIDEMIA ASSOCIATED WITH TYPE 2 DIABETES MELLITUS: Chronic | ICD-10-CM

## 2022-08-30 PROCEDURE — 99214 OFFICE O/P EST MOD 30 MIN: CPT | Mod: S$GLB,,, | Performed by: INTERNAL MEDICINE

## 2022-08-30 PROCEDURE — 3052F HG A1C>EQUAL 8.0%<EQUAL 9.0%: CPT | Mod: CPTII,S$GLB,, | Performed by: INTERNAL MEDICINE

## 2022-08-30 PROCEDURE — 1159F PR MEDICATION LIST DOCUMENTED IN MEDICAL RECORD: ICD-10-PCS | Mod: CPTII,S$GLB,, | Performed by: INTERNAL MEDICINE

## 2022-08-30 PROCEDURE — 4010F ACE/ARB THERAPY RXD/TAKEN: CPT | Mod: CPTII,S$GLB,, | Performed by: INTERNAL MEDICINE

## 2022-08-30 PROCEDURE — 1160F PR REVIEW ALL MEDS BY PRESCRIBER/CLIN PHARMACIST DOCUMENTED: ICD-10-PCS | Mod: CPTII,S$GLB,, | Performed by: INTERNAL MEDICINE

## 2022-08-30 PROCEDURE — 3066F NEPHROPATHY DOC TX: CPT | Mod: CPTII,S$GLB,, | Performed by: INTERNAL MEDICINE

## 2022-08-30 PROCEDURE — 3061F PR NEG MICROALBUMINURIA RESULT DOCUMENTED/REVIEW: ICD-10-PCS | Mod: CPTII,S$GLB,, | Performed by: INTERNAL MEDICINE

## 2022-08-30 PROCEDURE — 4010F PR ACE/ARB THEARPY RXD/TAKEN: ICD-10-PCS | Mod: CPTII,S$GLB,, | Performed by: INTERNAL MEDICINE

## 2022-08-30 PROCEDURE — 99999 PR PBB SHADOW E&M-EST. PATIENT-LVL IV: CPT | Mod: PBBFAC,,, | Performed by: INTERNAL MEDICINE

## 2022-08-30 PROCEDURE — 99999 PR PBB SHADOW E&M-EST. PATIENT-LVL IV: ICD-10-PCS | Mod: PBBFAC,,, | Performed by: INTERNAL MEDICINE

## 2022-08-30 PROCEDURE — 3074F PR MOST RECENT SYSTOLIC BLOOD PRESSURE < 130 MM HG: ICD-10-PCS | Mod: CPTII,S$GLB,, | Performed by: INTERNAL MEDICINE

## 2022-08-30 PROCEDURE — 3061F NEG MICROALBUMINURIA REV: CPT | Mod: CPTII,S$GLB,, | Performed by: INTERNAL MEDICINE

## 2022-08-30 PROCEDURE — 3008F PR BODY MASS INDEX (BMI) DOCUMENTED: ICD-10-PCS | Mod: CPTII,S$GLB,, | Performed by: INTERNAL MEDICINE

## 2022-08-30 PROCEDURE — 3052F PR MOST RECENT HEMOGLOBIN A1C LEVEL 8.0 - < 9.0%: ICD-10-PCS | Mod: CPTII,S$GLB,, | Performed by: INTERNAL MEDICINE

## 2022-08-30 PROCEDURE — 1160F RVW MEDS BY RX/DR IN RCRD: CPT | Mod: CPTII,S$GLB,, | Performed by: INTERNAL MEDICINE

## 2022-08-30 PROCEDURE — 99214 PR OFFICE/OUTPT VISIT, EST, LEVL IV, 30-39 MIN: ICD-10-PCS | Mod: S$GLB,,, | Performed by: INTERNAL MEDICINE

## 2022-08-30 PROCEDURE — 3078F PR MOST RECENT DIASTOLIC BLOOD PRESSURE < 80 MM HG: ICD-10-PCS | Mod: CPTII,S$GLB,, | Performed by: INTERNAL MEDICINE

## 2022-08-30 PROCEDURE — 3074F SYST BP LT 130 MM HG: CPT | Mod: CPTII,S$GLB,, | Performed by: INTERNAL MEDICINE

## 2022-08-30 PROCEDURE — 3066F PR DOCUMENTATION OF TREATMENT FOR NEPHROPATHY: ICD-10-PCS | Mod: CPTII,S$GLB,, | Performed by: INTERNAL MEDICINE

## 2022-08-30 PROCEDURE — 3008F BODY MASS INDEX DOCD: CPT | Mod: CPTII,S$GLB,, | Performed by: INTERNAL MEDICINE

## 2022-08-30 PROCEDURE — 3078F DIAST BP <80 MM HG: CPT | Mod: CPTII,S$GLB,, | Performed by: INTERNAL MEDICINE

## 2022-08-30 PROCEDURE — 1159F MED LIST DOCD IN RCRD: CPT | Mod: CPTII,S$GLB,, | Performed by: INTERNAL MEDICINE

## 2022-08-30 RX ORDER — LISINOPRIL 5 MG/1
5 TABLET ORAL DAILY
Qty: 90 TABLET | Refills: 3 | Status: SHIPPED | OUTPATIENT
Start: 2022-08-30 | End: 2023-08-27 | Stop reason: SDUPTHER

## 2022-08-30 NOTE — PROGRESS NOTES
Subjective:   Patient ID:  Luis Lozano is a 62 y.o. male who presents for follow-up of Follow-up  Patient denies CP, angina or anginal equivalent. Pt active walking every day.    Hypertension  This is a chronic problem. The current episode started more than 1 year ago. The problem has been gradually improving since onset. The problem is controlled. Pertinent negatives include no chest pain, palpitations or shortness of breath. Past treatments include ACE inhibitors and diuretics. The current treatment provides moderate improvement. There are no compliance problems.    Hyperlipidemia  This is a chronic problem. The current episode started more than 1 year ago. The problem is controlled. Pertinent negatives include no chest pain or shortness of breath. Current antihyperlipidemic treatment includes statins. The current treatment provides moderate improvement of lipids. There are no compliance problems.    Chest Pain   This is a new problem. The current episode started 1 to 4 weeks ago. The problem occurs intermittently. The problem has been waxing and waning. The pain is present in the substernal region. The pain is mild. The quality of the pain is described as dull. The pain does not radiate. Pertinent negatives include no dizziness, palpitations or shortness of breath. The treatment provided mild relief.   His past medical history is significant for hyperlipidemia.   Pertinent negatives for past medical history include no muscle weakness.     Review of Systems   Constitutional: Negative. Negative for weight gain.   HENT: Negative.     Eyes: Negative.    Cardiovascular: Negative.  Negative for chest pain, leg swelling and palpitations.   Respiratory: Negative.  Negative for shortness of breath.    Endocrine: Negative.    Hematologic/Lymphatic: Negative.    Skin: Negative.    Musculoskeletal:  Negative for muscle weakness.   Gastrointestinal: Negative.    Genitourinary: Negative.    Neurological: Negative.   Negative for dizziness.   Psychiatric/Behavioral: Negative.     Allergic/Immunologic: Negative.    All other systems reviewed and are negative.  Family History   Problem Relation Age of Onset    Diabetes Mother             Heart disease Mother 63    Lung cancer Father     Hypertension Maternal Grandmother      Past Medical History:   Diagnosis Date    Diabetes mellitus, type 2     Hypertension     Spermatocele 9/3/2019     Social History     Socioeconomic History    Marital status:    Tobacco Use    Smoking status: Never    Smokeless tobacco: Never   Substance and Sexual Activity    Alcohol use: No    Drug use: No   Social History Narrative    ** Merged History Encounter **          Current Outpatient Medications on File Prior to Visit   Medication Sig Dispense Refill    aspirin (ECOTRIN) 81 MG EC tablet Take 1 tablet (81 mg total) by mouth once daily.      atorvastatin (LIPITOR) 40 MG tablet Take 1 tablet (40 mg total) by mouth once daily. 90 tablet 4    gabapentin (NEURONTIN) 300 MG capsule Take 1 capsule (300 mg total) by mouth 3 (three) times daily. 270 capsule 3    glimepiride (AMARYL) 4 MG tablet Take 2 tablets (8 mg total) by mouth daily with breakfast. 180 tablet 4    hydroCHLOROthiazide (HYDRODIURIL) 25 MG tablet Take 1 tablet (25 mg total) by mouth once daily. 90 tablet 4    hydrocodone-chlorpheniramine (TUSSIONEX) 10-8 mg/5 mL suspension Take 5 mLs by mouth every 12 (twelve) hours as needed for Cough. 70 mL 0    meclizine (ANTIVERT) 12.5 mg tablet TAKE 2 TABLETS BY MOUTH THREE TIMES DAILY AS NEEDED FOR DIZZINESS 30 tablet 12    metFORMIN (GLUCOPHAGE-XR) 500 MG ER 24hr tablet Take 2 tablets (1,000 mg total) by mouth 2 (two) times daily with meals. 360 tablet 4    mv-min-folic acid-lutein 200-137.5 mcg Chew Adult Multivitamin (w-lutein)   1 tablet by mouth daily      tamsulosin (FLOMAX) 0.4 mg Cap Take 1 capsule by mouth nightly.      [DISCONTINUED] lisinopriL (PRINIVIL,ZESTRIL) 5 MG tablet  Take 1 tablet (5 mg total) by mouth once daily. 90 tablet 4     No current facility-administered medications on file prior to visit.     Review of patient's allergies indicates:  No Known Allergies    Objective:     Physical Exam  Vitals and nursing note reviewed.   Constitutional:       Appearance: He is well-developed.   HENT:      Head: Normocephalic and atraumatic.   Eyes:      Conjunctiva/sclera: Conjunctivae normal.      Pupils: Pupils are equal, round, and reactive to light.   Cardiovascular:      Rate and Rhythm: Normal rate and regular rhythm.      Pulses: Intact distal pulses.      Heart sounds: Normal heart sounds.   Pulmonary:      Effort: Pulmonary effort is normal.      Breath sounds: Normal breath sounds.   Abdominal:      General: Bowel sounds are normal.      Palpations: Abdomen is soft.   Musculoskeletal:      Cervical back: Normal range of motion and neck supple.   Skin:     General: Skin is warm and dry.   Neurological:      Mental Status: He is alert and oriented to person, place, and time.       Assessment:     1. Hypertension associated with diabetes    2. Encounter for long-term (current) use of medications    3. Pure hypercholesterolemia    4. Hyperlipidemia associated with type 2 diabetes mellitus    5. Palpitations        Plan:     Hypertension associated with diabetes    Encounter for long-term (current) use of medications  -     lisinopriL (PRINIVIL,ZESTRIL) 5 MG tablet; Take 1 tablet (5 mg total) by mouth once daily.  Dispense: 90 tablet; Refill: 3    Pure hypercholesterolemia    Hyperlipidemia associated with type 2 diabetes mellitus    Palpitations        Continue lisinopril, hczt-htn  Continue statin-hlp

## 2022-09-21 ENCOUNTER — LAB VISIT (OUTPATIENT)
Dept: LAB | Facility: HOSPITAL | Age: 62
End: 2022-09-21
Attending: FAMILY MEDICINE
Payer: COMMERCIAL

## 2022-09-21 DIAGNOSIS — Z79.899 ENCOUNTER FOR LONG-TERM (CURRENT) USE OF MEDICATIONS: ICD-10-CM

## 2022-09-21 DIAGNOSIS — Z00.01 ENCOUNTER FOR GENERAL ADULT MEDICAL EXAMINATION WITH ABNORMAL FINDINGS: ICD-10-CM

## 2022-09-21 LAB
ESTIMATED AVG GLUCOSE: 180 MG/DL (ref 68–131)
HBA1C MFR BLD: 7.9 % (ref 4–5.6)

## 2022-09-21 PROCEDURE — 83036 HEMOGLOBIN GLYCOSYLATED A1C: CPT | Performed by: FAMILY MEDICINE

## 2022-09-21 PROCEDURE — 36415 COLL VENOUS BLD VENIPUNCTURE: CPT | Mod: PO | Performed by: FAMILY MEDICINE

## 2022-09-26 NOTE — PROGRESS NOTES
Make follow-up lab appointment per recommendation below.  Check to see if patient has seen the results through my chart.  If not then,  #CALL THE PATIENT# to discuss results/see if they have questions and document verification of contact. Make F/U appt if needed. [phone]    #My interpretation that was sent to them through [employer]:  Luis, I have reviewed your recent blood work.     Your hemoglobin A1c is improved.  A1c is now below goal of 8.0.  Continue current medications.  Recheck labs in three months.  This test is gold standard screening test for diabetes.  It is a measures 3 months of your average blood sugar.    =========================  Also please address any outstanding health maintenance that may be due: TETANUS VACCINE Never done  Shingles Vaccine(1 of 2) Never done  COVID-19 Vaccine(4 - Booster for Pfizer series) due on 01/21/2022  Influenza Vaccine(1) due on 09/01/2022  Eye Exam due on 09/07/2022

## 2022-10-18 ENCOUNTER — PATIENT MESSAGE (OUTPATIENT)
Dept: ADMINISTRATIVE | Facility: HOSPITAL | Age: 62
End: 2022-10-18
Payer: COMMERCIAL

## 2022-10-24 ENCOUNTER — E-VISIT (OUTPATIENT)
Dept: FAMILY MEDICINE | Facility: CLINIC | Age: 62
End: 2022-10-24
Payer: COMMERCIAL

## 2022-10-24 ENCOUNTER — PATIENT MESSAGE (OUTPATIENT)
Dept: FAMILY MEDICINE | Facility: CLINIC | Age: 62
End: 2022-10-24

## 2022-10-24 DIAGNOSIS — J06.9 UPPER RESPIRATORY TRACT INFECTION, UNSPECIFIED TYPE: Primary | ICD-10-CM

## 2022-10-24 PROCEDURE — 99421 PR E&M, ONLINE DIGIT, EST, < 7 DAYS, 5-10 MINS: ICD-10-PCS | Mod: S$GLB,,, | Performed by: FAMILY MEDICINE

## 2022-10-24 PROCEDURE — 99421 OL DIG E/M SVC 5-10 MIN: CPT | Mod: S$GLB,,, | Performed by: FAMILY MEDICINE

## 2022-10-24 RX ORDER — PREDNISONE 20 MG/1
20 TABLET ORAL DAILY
Qty: 5 TABLET | Refills: 0 | Status: SHIPPED | OUTPATIENT
Start: 2022-10-24 | End: 2022-10-29

## 2022-10-24 RX ORDER — PROMETHAZINE HYDROCHLORIDE AND DEXTROMETHORPHAN HYDROBROMIDE 6.25; 15 MG/5ML; MG/5ML
5 SYRUP ORAL EVERY 4 HOURS PRN
Qty: 120 ML | Refills: 0 | Status: SHIPPED | OUTPATIENT
Start: 2022-10-24 | End: 2022-11-03

## 2022-10-24 NOTE — PROGRESS NOTES
Patient ID: Luis Lozano is a 62 y.o. male.    Chief Complaint: URI    The patient initiated a request through [employer] on 10/24/2022 for evaluation and management with a chief complaint of URI     I evaluated the questionnaire submission on 10/24/2022  .    [facility] Peq Evisit Upper Respitatory/Cough Questionnaire    10/24/2022 10:46 AM CDT - Filed by Patient   Do you agree to participate in an E-Visit? Yes   If you have any of the following symptoms, please present to your local ER or call 911:  I acknowledge   What is the main issue that you would like for your doctor to address today? head congestion, phlegm, mucus in sinus, cough   Are you able to take your vital signs? Yes   Systolic Blood Pressure: 122   Diastolic Blood Pressure: 81   Weight: 203   Height: 60   Pulse: 99   Temp: 97   Resp: 96   Pulse Ox: 87   What symptoms do you currently have?  Cough;  Nasal Congestion;  Sore throat   Have you had a fever? No   When did your symptoms first appear? 10/20/2022   In the last two weeks, have you been in close contact with someone who has COVID-19? No   In the last two weeks, have you worked or volunteered in a healthcare facility or as a ? Healthcare facilities include a hospital, medical or dental clinic, long-term care facility, or nursing home No   Do you live in a long-term care facility, nursing home, or homeless shelter? No   List what you have done or taken to help your symptoms. otc meds   How severe are your symptoms? Moderate   Have you taken an at home Covid test? No   Have you been fully vaccinated for COVID? (2 Pfizer, 2 Moderna or 1 [employer] vaccine injections) Yes   Have you received a booster? Yes   Do you have transportation to get tested for COVID if it is indicated and ordered for you at an [facility] location? Yes   Provide any information you feel is important to your history not asked above    Please attach any relevant images or files           Active Problem List with  Overview Notes    Diagnosis Date Noted    Nausea 06/21/2022     Patient needing a refill on Zofran      Type 2 diabetes mellitus with diabetic peripheral angiopathy without gangrene, without long-term current use of insulin 06/21/2022     See other diabetes problem.  Patient does follow with pain management.  He is on gabapentin 300 , three times daily      Bilateral plantar fasciitis 11/11/2021    Benign prostatic hyperplasia 02/11/2021    Gastroesophageal reflux disease without esophagitis 02/11/2021    Neuropathy 02/11/2021    Dizziness 01/12/2021    Palpitations 01/12/2021    Left wrist pain 09/09/2020     Subacute.  Started a couple of weeks ago when patient had a type fall on to his left wrist.  Patient reports that it did not hurt right away and did not swell.  After few days it started to ache and is still hurting to this day.  Patient has been taking ibuprofen and had x-ray at an orthopedic office but I do not have the records.  Patient was told that the x-ray was negative.  Denies any weakness of the hand.      Hyperlipidemia associated with type 2 diabetes mellitus 08/05/2019     Initial HPI:  Chronic.  Control is uncertain.  Due for labs.  On atorvastatin 20 mg.  Reports compliance.  No side effects reported.  November 2019:  Patient has been on increased dose of atorvastatin 40 mg and is not having any side effects currently.  September 2020:  Compliant with medications.  Patient is due for fasting lipid panel.  December 2020:  Patient is due for updated lipid panel.  Will plan for fasting blood work.  Continue medication regimen.  CHRONIC. STABLE. Lab analysis reviewed.   (-) CP, SOB, abdominal pain, N/V/D, constipation, jaundice, skin changes.  (-) Myalgias  Lab Results   Component Value Date    CHOL 160 11/22/2019    CHOL 185 08/05/2019    CHOL 225 (H) 06/03/2017     Lab Results   Component Value Date    HDL 53 11/22/2019    HDL 57 08/05/2019    HDL 52 03/08/2018     Lab Results   Component Value  Date    LDLCALC 89.0 11/22/2019    LDLCALC 111.4 08/05/2019    LDLCALC 93 03/08/2018     Lab Results   Component Value Date    TRIG 90 11/22/2019    TRIG 83 08/05/2019    TRIG 72 03/08/2018     Lab Results   Component Value Date    CHOLHDL 33.1 11/22/2019    CHOLHDL 30.8 08/05/2019    CHOLHDL 24.9 06/03/2017     Lab Results   Component Value Date    TOTALCHOLEST 3.0 11/22/2019    TOTALCHOLEST 3.2 08/05/2019    TOTALCHOLEST 4.0 06/03/2017     Lab Results   Component Value Date    ALT 29 11/02/2020    AST 32 11/02/2020    ALKPHOS 53 11/02/2020    BILITOT 0.6 11/02/2020     ======================================================      Type 2 diabetes mellitus with hyperglycemia, without long-term current use of insulin 08/05/2019 June 2022:  Patient has been lost to follow-up since December of 2020. Today we had a long discussion about new or medications such as Ozempic try to get him off of some of the oral medication and avoid progression to insulin.  Patient and his wife were in agreement with trying this medication.  He denies any history of pancreatitis, thyroid cancer, MEN syndrome.  November 2019:  ReviewedDiabetes Management StatusStatin: TakingACE/ARB: Not taking  MARCH 2020:  Reviewed Diabetes Management StatusStatin: TakingACE/ARB: Taking  SEPTEMBER 2020:  Reviewed Diabetes Management Status Statin: TakingACE/ARB: Taking  December 2020:  Reviewed Diabetes Management StatusStatin: TakingACE/ARB: Taking patient admits to diet noncompliance and medication not being taken as prescribed.  Patient reports that he decrease the dosage of metformin on his own.  Diabetes Management Status    Statin: Taking  ACE/ARB: Taking    Screening or Prevention Patient's value Goal Complete/Controlled?   HgA1C Testing and Control   Lab Results   Component Value Date    HGBA1C 8.3 (H) 06/20/2022      Annually/Less than 8% No   Lipid profile : 11/08/2021 Annually No   LDL control Lab Results   Component Value Date    LDLCALC 114  11/08/2021    Annually/Less than 100 mg/dl  No   Nephropathy screening Lab Results   Component Value Date    LABMICR 6.0 06/20/2022     Lab Results   Component Value Date    PROTEINUA Negative 05/14/2021     No results found for: UTPCR   Annually Yes   Blood pressure BP Readings from Last 1 Encounters:   06/20/22 128/84    Less than 140/90 Yes   Dilated retinal exam : 09/07/2021 Annually Yes   Foot exam   : 09/09/2020 Annually No         Radiculopathy, lumbar region 08/05/2019     Chronic.  Stable.  Patient sees pain management.  Taking gabapentin 300 mg per medication list.  Needs refill.  Reports compliance.  No side effects reported.      Vitamin D deficiency 08/05/2019     Chronic.  Control is uncertain.  Due for vitamin-D level.        Encounter for long-term (current) use of medications 08/05/2019 08/06/2019 : CHRONIC long-term drug therapy for managed conditions. See medication list. Reports compliance.  No side effects reported.  Routine lab work is being monitored.  Patient does  need refills today.   November 2019:  CHRONIC. Stable. Compliant with medications for managed conditions. See medication list. No SE reported. Routine lab analysis is being monitored. Refills were addressed.  SEPTEMBER 2020:  Reviewed labs.  CHRONIC. Stable. Compliant with medications for managed conditions. See medication list. No SE reported. Routine lab analysis is being monitored. Refills were addressed.  DECEMBER 2020:  Reviewed labs.  CHRONIC. Stable. Compliant with medications for managed conditions. See medication list. No SE reported. Routine lab analysis is being monitored. Refills were addressed.  June 2022:  Reviewed labs.  Lab Results   Component Value Date    WBC 6.78 02/04/2022    HGB 15.7 02/04/2022    HCT 47.4 02/04/2022    MCV 88 02/04/2022     02/04/2022       Chemistry        Component Value Date/Time     06/20/2022 1359    K 4.0 06/20/2022 1359     06/20/2022 1359    CO2 29 06/20/2022  1359    BUN 22 06/20/2022 1359    CREATININE 1.1 06/20/2022 1359    GLU 59 (L) 06/20/2022 1359        Component Value Date/Time    CALCIUM 10.5 06/20/2022 1359    ALKPHOS 44 (L) 06/20/2022 1359    AST 17 06/20/2022 1359    ALT 19 06/20/2022 1359    BILITOT 0.8 06/20/2022 1359    ESTGFRAFRICA >60.0 06/20/2022 1359    EGFRNONAA >60.0 06/20/2022 1359          Lab Results   Component Value Date    TSH 0.796 08/05/2019    Y5XDGLO 7.2 08/05/2019    T3FREE 2.6 08/05/2019     =================================================      Uncontrolled type 2 diabetes mellitus with peripheral neuropathy 04/26/2019 September 2020 update:  Patient is compliant with gabapentin 300 3 times per day.  Previous plan:  Chronic.  Control is uncertain.  Due for hemoglobin A1c.  Patient is seeing eye doctor and foot doctor.  Patient is taking glimepiride Tradjenta and metformin extended release 500 mg.  Patient has not started his Trulicity yet.  Patient does check his blood sugar.  He is eligible for the [employer] diabetes program is interested.      Hypertension associated with diabetes 09/23/2013 June 2022:  Patient has been lost to follow-up since December of 2020. Blood pressure remains controlled at this time on current medication regimen.    December 2020:  Blood pressure is well controlled today.  Patient reports compliance with medication regimen.  SEPTEMBER 2020:  Patient with recent elevations in his blood pressures.  Recently went to ER for chest pain but was given medication for reflux that resolve the pain.  Patient has been doing well since then.  CHRONIC. STABLE. BP Reviewed.  Compliant with BP medications. No SE reported.   (-) CP, SOB, palpitations, dizziness, lightheadedness, HA, arm numbness, tingling or weakness, syncope.  Creatinine   Date Value Ref Range Status   06/20/2022 1.1 0.5 - 1.4 mg/dL Final     Initial HPI:  Chronic.  Stable.  Patient taking hydrochlorothiazide 12.5 mg amlodipine 2.5 mg.  Reports  compliance.  No side effects reported.  No symptoms of chest pain shortness of breath blurry vision etc.  November 2019:  Patient reports that he did have episode of vertigo and went to the ER but was given meclizine and Tessalon Perles for the cough.  Has not been having issues since then.CHRONIC. STABLE. BP Reviewed.  Compliant with BP medications. No SE reported.         Hyperlipidemia 09/23/2013     CHRONIC. STABLE. Lab analysis reviewed.   (-) CP, SOB, abdominal pain, N/V/D, constipation, jaundice, skin changes.  (-) Myalgias  Lab Results   Component Value Date    CHOL 193 11/08/2021    CHOL 176 07/15/2021    CHOL 160 11/22/2019     Lab Results   Component Value Date    HDL 64 (H) 11/08/2021    HDL 49 07/15/2021    HDL 53 11/22/2019     Lab Results   Component Value Date    LDLCALC 114 11/08/2021    LDLCALC 112 07/15/2021    LDLCALC 89.0 11/22/2019     Lab Results   Component Value Date    TRIG 75 11/08/2021    TRIG 76 07/15/2021    TRIG 90 11/22/2019     Lab Results   Component Value Date    CHOLHDL 3.02 11/08/2021    CHOLHDL 3.59 07/15/2021    CHOLHDL 33.1 11/22/2019     Lab Results   Component Value Date    TOTALCHOLEST 3.0 11/22/2019    TOTALCHOLEST 3.2 08/05/2019    TOTALCHOLEST 4.0 06/03/2017     Lab Results   Component Value Date    ALT 19 06/20/2022    AST 17 06/20/2022    ALKPHOS 44 (L) 06/20/2022    BILITOT 0.8 06/20/2022   ======================================================  The ASCVD Risk score (Shanadarron LÓPEZ Jr., et al., 2013) failed to calculate for the following reasons:    The patient has a prior MI or stroke diagnosis          Recent Labs Obtained:  No visits with results within 7 Day(s) from this visit.   Latest known visit with results is:   Lab Visit on 09/21/2022   Component Date Value Ref Range Status    Hemoglobin A1C 09/21/2022 7.9 (H)  4.0 - 5.6 % Final    Comment: ADA Screening Guidelines:  5.7-6.4%  Consistent with prediabetes  >or=6.5%  Consistent with diabetes    High levels of fetal  hemoglobin interfere with the HbA1C  assay. Heterozygous hemoglobin variants (HbS, HgC, etc)do  not significantly interfere with this assay.   However, presence of multiple variants may affect accuracy.      Estimated Avg Glucose 09/21/2022 180 (H)  68 - 131 mg/dL Final       Encounter Diagnosis   Name Primary?    Upper respiratory tract infection, unspecified type Yes        No orders of the defined types were placed in this encounter.     Medications Ordered This Encounter   Medications    predniSONE (DELTASONE) 20 MG tablet     Sig: Take 1 tablet (20 mg total) by mouth once daily. for 5 days     Dispense:  5 tablet     Refill:  0    promethazine-dextromethorphan (PROMETHAZINE-DM) 6.25-15 mg/5 mL Syrp     Sig: Take 5 mLs by mouth every 4 (four) hours as needed (cough).     Dispense:  120 mL     Refill:  0        E-Visit Time Tracking:    Day 1 Time (in minutes): 7     Total Time (in minutes): 7

## 2022-10-24 NOTE — PATIENT INSTRUCTIONS
Follow up if symptoms worsen or fail to improve.     Dear patient,   As a result of recent federal legislation (The Federal Cures Act), you may receive lab or pathology results from your visit in your [facility] account before your physician is able to contact you. Your physician or their representative will relay the results to you with their recommendations at their soonest availability.     If no improvement in symptoms or symptoms worsen, please be advised to call MD, follow-up at clinic and/or go to ER if becomes severe.    Charles Nash M.D.        We Offer TELEHEALTH & Same Day Appointments!   Book your Telehealth appointment with me through my nurse or   Clinic appointments on [facility]!    [address] [place] [address]    Office: [phone]   FAX: [phone]    Check out my Facebook Page and Follow Me at: [email or web address]    Check out my website at [employer] by clicking on: [email or web address]    To Schedule appointments online, go to [email or web address]: [email or web address]

## 2022-12-20 ENCOUNTER — OFFICE VISIT (OUTPATIENT)
Dept: FAMILY MEDICINE | Facility: CLINIC | Age: 62
End: 2022-12-20
Payer: COMMERCIAL

## 2022-12-20 VITALS
SYSTOLIC BLOOD PRESSURE: 126 MMHG | HEART RATE: 79 BPM | DIASTOLIC BLOOD PRESSURE: 68 MMHG | WEIGHT: 202 LBS | OXYGEN SATURATION: 98 % | HEIGHT: 69 IN | TEMPERATURE: 98 F | BODY MASS INDEX: 29.92 KG/M2

## 2022-12-20 DIAGNOSIS — Z79.899 ENCOUNTER FOR LONG-TERM (CURRENT) USE OF MEDICATIONS: ICD-10-CM

## 2022-12-20 DIAGNOSIS — E78.5 HYPERLIPIDEMIA ASSOCIATED WITH TYPE 2 DIABETES MELLITUS: ICD-10-CM

## 2022-12-20 DIAGNOSIS — E11.59 HYPERTENSION ASSOCIATED WITH DIABETES: ICD-10-CM

## 2022-12-20 DIAGNOSIS — E11.51 TYPE 2 DIABETES MELLITUS WITH DIABETIC PERIPHERAL ANGIOPATHY WITHOUT GANGRENE, WITHOUT LONG-TERM CURRENT USE OF INSULIN: Primary | Chronic | ICD-10-CM

## 2022-12-20 DIAGNOSIS — E11.69 HYPERLIPIDEMIA ASSOCIATED WITH TYPE 2 DIABETES MELLITUS: ICD-10-CM

## 2022-12-20 DIAGNOSIS — Z12.31 OTHER SCREENING MAMMOGRAM: ICD-10-CM

## 2022-12-20 DIAGNOSIS — I15.2 HYPERTENSION ASSOCIATED WITH DIABETES: ICD-10-CM

## 2022-12-20 DIAGNOSIS — R42 DIZZINESS: ICD-10-CM

## 2022-12-20 PROCEDURE — 1159F MED LIST DOCD IN RCRD: CPT | Mod: CPTII,S$GLB,, | Performed by: FAMILY MEDICINE

## 2022-12-20 PROCEDURE — 3051F HG A1C>EQUAL 7.0%<8.0%: CPT | Mod: CPTII,S$GLB,, | Performed by: FAMILY MEDICINE

## 2022-12-20 PROCEDURE — 99999 PR PBB SHADOW E&M-EST. PATIENT-LVL V: CPT | Mod: PBBFAC,,, | Performed by: FAMILY MEDICINE

## 2022-12-20 PROCEDURE — 3078F DIAST BP <80 MM HG: CPT | Mod: CPTII,S$GLB,, | Performed by: FAMILY MEDICINE

## 2022-12-20 PROCEDURE — 90686 IIV4 VACC NO PRSV 0.5 ML IM: CPT | Mod: S$GLB,,, | Performed by: FAMILY MEDICINE

## 2022-12-20 PROCEDURE — 3008F PR BODY MASS INDEX (BMI) DOCUMENTED: ICD-10-PCS | Mod: CPTII,S$GLB,, | Performed by: FAMILY MEDICINE

## 2022-12-20 PROCEDURE — 1160F RVW MEDS BY RX/DR IN RCRD: CPT | Mod: CPTII,S$GLB,, | Performed by: FAMILY MEDICINE

## 2022-12-20 PROCEDURE — 90686 FLU VACCINE (QUAD) GREATER THAN OR EQUAL TO 3YO PRESERVATIVE FREE IM: ICD-10-PCS | Mod: S$GLB,,, | Performed by: FAMILY MEDICINE

## 2022-12-20 PROCEDURE — 4010F PR ACE/ARB THEARPY RXD/TAKEN: ICD-10-PCS | Mod: CPTII,S$GLB,, | Performed by: FAMILY MEDICINE

## 2022-12-20 PROCEDURE — 99999 PR PBB SHADOW E&M-EST. PATIENT-LVL V: ICD-10-PCS | Mod: PBBFAC,,, | Performed by: FAMILY MEDICINE

## 2022-12-20 PROCEDURE — 3074F SYST BP LT 130 MM HG: CPT | Mod: CPTII,S$GLB,, | Performed by: FAMILY MEDICINE

## 2022-12-20 PROCEDURE — 99214 PR OFFICE/OUTPT VISIT, EST, LEVL IV, 30-39 MIN: ICD-10-PCS | Mod: 25,S$GLB,, | Performed by: FAMILY MEDICINE

## 2022-12-20 PROCEDURE — 3074F PR MOST RECENT SYSTOLIC BLOOD PRESSURE < 130 MM HG: ICD-10-PCS | Mod: CPTII,S$GLB,, | Performed by: FAMILY MEDICINE

## 2022-12-20 PROCEDURE — 1160F PR REVIEW ALL MEDS BY PRESCRIBER/CLIN PHARMACIST DOCUMENTED: ICD-10-PCS | Mod: CPTII,S$GLB,, | Performed by: FAMILY MEDICINE

## 2022-12-20 PROCEDURE — 3078F PR MOST RECENT DIASTOLIC BLOOD PRESSURE < 80 MM HG: ICD-10-PCS | Mod: CPTII,S$GLB,, | Performed by: FAMILY MEDICINE

## 2022-12-20 PROCEDURE — 4010F ACE/ARB THERAPY RXD/TAKEN: CPT | Mod: CPTII,S$GLB,, | Performed by: FAMILY MEDICINE

## 2022-12-20 PROCEDURE — 3061F PR NEG MICROALBUMINURIA RESULT DOCUMENTED/REVIEW: ICD-10-PCS | Mod: CPTII,S$GLB,, | Performed by: FAMILY MEDICINE

## 2022-12-20 PROCEDURE — 3066F NEPHROPATHY DOC TX: CPT | Mod: CPTII,S$GLB,, | Performed by: FAMILY MEDICINE

## 2022-12-20 PROCEDURE — 3066F PR DOCUMENTATION OF TREATMENT FOR NEPHROPATHY: ICD-10-PCS | Mod: CPTII,S$GLB,, | Performed by: FAMILY MEDICINE

## 2022-12-20 PROCEDURE — 1159F PR MEDICATION LIST DOCUMENTED IN MEDICAL RECORD: ICD-10-PCS | Mod: CPTII,S$GLB,, | Performed by: FAMILY MEDICINE

## 2022-12-20 PROCEDURE — 3061F NEG MICROALBUMINURIA REV: CPT | Mod: CPTII,S$GLB,, | Performed by: FAMILY MEDICINE

## 2022-12-20 PROCEDURE — 3051F PR MOST RECENT HEMOGLOBIN A1C LEVEL 7.0 - < 8.0%: ICD-10-PCS | Mod: CPTII,S$GLB,, | Performed by: FAMILY MEDICINE

## 2022-12-20 PROCEDURE — 90471 FLU VACCINE (QUAD) GREATER THAN OR EQUAL TO 3YO PRESERVATIVE FREE IM: ICD-10-PCS | Mod: S$GLB,,, | Performed by: FAMILY MEDICINE

## 2022-12-20 PROCEDURE — 3008F BODY MASS INDEX DOCD: CPT | Mod: CPTII,S$GLB,, | Performed by: FAMILY MEDICINE

## 2022-12-20 PROCEDURE — 90471 IMMUNIZATION ADMIN: CPT | Mod: S$GLB,,, | Performed by: FAMILY MEDICINE

## 2022-12-20 PROCEDURE — 99214 OFFICE O/P EST MOD 30 MIN: CPT | Mod: 25,S$GLB,, | Performed by: FAMILY MEDICINE

## 2022-12-20 RX ORDER — SEMAGLUTIDE 1.34 MG/ML
INJECTION, SOLUTION SUBCUTANEOUS
Qty: 1 PEN | Refills: 1 | Status: SHIPPED | OUTPATIENT
Start: 2022-12-20 | End: 2023-02-18

## 2022-12-20 RX ORDER — MECLIZINE HCL 12.5 MG 12.5 MG/1
TABLET ORAL
Qty: 30 TABLET | Refills: 12 | Status: SHIPPED | OUTPATIENT
Start: 2022-12-20 | End: 2023-11-27 | Stop reason: SDUPTHER

## 2022-12-20 NOTE — ASSESSMENT & PLAN NOTE
Start Ozempic.  Continue gabapentin.  Follow-up with pain management.    We will plan to monitor hemoglobin A1c at designated intervals 3 to 6 months.  I recommend ongoing Education for diabetic diet and exercise protocol.  We will continue to monitor for side effects.    Please be advised of symptoms to monitor for and to notify me immediately if persistent or worsening.  Follow up with Ophthalmology/Optometry and Podiatry at least annually.

## 2022-12-20 NOTE — ASSESSMENT & PLAN NOTE
Update fasting lipid panel.  Consider increasing atorvastatin to 80 milligrams to lower LDL less than 70 with his risk factors.  Counseled on hyperlipidemia disease course, healthy diet and increased need for exercise.  Please be advised of the risk of cardiovascular disease, increase stroke and heart attack risk with uncontrolled/untreated hyperlipidemia.     Patient voiced understanding and understood the treatment plan. All questions were answered.

## 2022-12-20 NOTE — ASSESSMENT & PLAN NOTE
Refill meclizine.  Discussed risk and benefits of medication use.  Discussed condition course.  Follow-up sooner if no improvement.  Consider ENT/vestibular therapy if needed.

## 2022-12-20 NOTE — PROGRESS NOTES
PLAN:      Problem List Items Addressed This Visit       Hypertension associated with diabetes (Chronic)     Continue lisinopril and hydrochlorothiazide.Counseled on importance of hypertension disease course, I recommend ongoing Education for DASH-diet and exercise.  Counseled on medication regimen importance of treating high blood pressure.  Please be advised of risk of untreated blood pressure as discussed.  Please advised of ER precautions were given for symptoms of hypertensive urgency and emergency.           Relevant Medications    semaglutide (OZEMPIC) 0.25 mg or 0.5 mg(2 mg/1.5 mL) pen injector    Hyperlipidemia associated with type 2 diabetes mellitus (Chronic)     Update fasting lipid panel.  Consider increasing atorvastatin to 80 milligrams to lower LDL less than 70 with his risk factors.  Counseled on hyperlipidemia disease course, healthy diet and increased need for exercise.  Please be advised of the risk of cardiovascular disease, increase stroke and heart attack risk with uncontrolled/untreated hyperlipidemia.     Patient voiced understanding and understood the treatment plan. All questions were answered.              Relevant Medications    semaglutide (OZEMPIC) 0.25 mg or 0.5 mg(2 mg/1.5 mL) pen injector    Encounter for long-term (current) use of medications (Chronic)     Complete history and physical was completed today.  Complete and thorough medication reconciliation was performed.  Discussed risks and benefits of medications.  Advised patient on orders and health maintenance.  We discussed old records and old labs if available.  Will request any records not available through epic.  Continue current medications listed on your summary sheet.           Relevant Medications    meclizine (ANTIVERT) 12.5 mg tablet    Type 2 diabetes mellitus with diabetic peripheral angiopathy without gangrene, without long-term current use of insulin - Primary (Chronic)     Start Ozempic.  Continue gabapentin.   Follow-up with pain management.    We will plan to monitor hemoglobin A1c at designated intervals 3 to 6 months.  I recommend ongoing Education for diabetic diet and exercise protocol.  We will continue to monitor for side effects.    Please be advised of symptoms to monitor for and to notify me immediately if persistent or worsening.  Follow up with Ophthalmology/Optometry and Podiatry at least annually.           Relevant Medications    semaglutide (OZEMPIC) 0.25 mg or 0.5 mg(2 mg/1.5 mL) pen injector    Dizziness     Refill meclizine.  Discussed risk and benefits of medication use.  Discussed condition course.  Follow-up sooner if no improvement.  Consider ENT/vestibular therapy if needed.         Relevant Medications    meclizine (ANTIVERT) 12.5 mg tablet     Future Appointments       Date Provider Specialty Appt Notes    2/28/2023 Jovani Alba MD Cardiology 6 month f/u    3/20/2023  Lab .           Medication Management for assessment above:   Medication List with Changes/Refills   New Medications    SEMAGLUTIDE (OZEMPIC) 0.25 MG OR 0.5 MG(2 MG/1.5 ML) PEN INJECTOR    Inject 0.25 mg into the skin once a week for 30 days, THEN 0.5 mg once a week.   Current Medications    ASPIRIN (ECOTRIN) 81 MG EC TABLET    Take 1 tablet (81 mg total) by mouth once daily.    ATORVASTATIN (LIPITOR) 40 MG TABLET    Take 1 tablet (40 mg total) by mouth once daily.    GABAPENTIN (NEURONTIN) 300 MG CAPSULE    Take 1 capsule (300 mg total) by mouth 3 (three) times daily.    GLIMEPIRIDE (AMARYL) 4 MG TABLET    Take 2 tablets (8 mg total) by mouth daily with breakfast.    HYDROCHLOROTHIAZIDE (HYDRODIURIL) 25 MG TABLET    Take 1 tablet (25 mg total) by mouth once daily.    HYDROCODONE-CHLORPHENIRAMINE (TUSSIONEX) 10-8 MG/5 ML SUSPENSION    Take 5 mLs by mouth every 12 (twelve) hours as needed for Cough.    LISINOPRIL (PRINIVIL,ZESTRIL) 5 MG TABLET    Take 1 tablet (5 mg total) by mouth once daily.    METFORMIN (GLUCOPHAGE-XR) 500  MG ER 24HR TABLET    Take 2 tablets (1,000 mg total) by mouth 2 (two) times daily with meals.    MV-MIN-FOLIC ACID-LUTEIN 200-137.5 MCG CHEW    Adult Multivitamin (w-lutein)   1 tablet by mouth daily    TAMSULOSIN (FLOMAX) 0.4 MG CAP    Take 1 capsule by mouth nightly.   Changed and/or Refilled Medications    Modified Medication Previous Medication    MECLIZINE (ANTIVERT) 12.5 MG TABLET meclizine (ANTIVERT) 12.5 mg tablet       TAKE 2 TABLETS BY MOUTH THREE TIMES DAILY AS NEEDED FOR DIZZINESS    TAKE 2 TABLETS BY MOUTH THREE TIMES DAILY AS NEEDED FOR DIZZINESS       Charles Nash M.D.  ==========================================================================  Subjective:   Patient ID: Luis Lozano is a 62 y.o. male.  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   Chief Complaint: type 2 diabetes      Problem List Items Addressed This Visit       Hypertension associated with diabetes (Chronic)    Overview     December 2022: Blood pressure is well controlled.  Hypertension Medications               hydroCHLOROthiazide (HYDRODIURIL) 25 MG tablet Take 1 tablet (25 mg total) by mouth once daily.    lisinopriL (PRINIVIL,ZESTRIL) 5 MG tablet Take 1 tablet (5 mg total) by mouth once daily.            June 2022:  Patient has been lost to follow-up since December of 2020. Blood pressure remains controlled at this time on current medication regimen.    December 2020:  Blood pressure is well controlled today.  Patient reports compliance with medication regimen.  SEPTEMBER 2020:  Patient with recent elevations in his blood pressures.  Recently went to ER for chest pain but was given medication for reflux that resolve the pain.  Patient has been doing well since then.  CHRONIC. STABLE. BP Reviewed.  Compliant with BP medications. No SE reported.   (-) CP, SOB, palpitations, dizziness, lightheadedness, HA, arm numbness, tingling or weakness, syncope.  Creatinine    Date Value Ref Range Status   06/20/2022 1.1 0.5 - 1.4 mg/dL Final     Initial HPI:  Chronic.  Stable.  Patient taking hydrochlorothiazide 12.5 mg amlodipine 2.5 mg.  Reports compliance.  No side effects reported.  No symptoms of chest pain shortness of breath blurry vision etc.  November 2019:  Patient reports that he did have episode of vertigo and went to the ER but was given meclizine and Tessalon Perles for the cough.  Has not been having issues since then.CHRONIC. STABLE. BP Reviewed.  Compliant with BP medications. No SE reported.     Results for orders placed or performed during the hospital encounter of 02/04/22   EKG 12-lead    Collection Time: 02/04/22  3:28 AM    Narrative    Test Reason : R07.9,    Vent. Rate : 066 BPM     Atrial Rate : 066 BPM     P-R Int : 220 ms          QRS Dur : 092 ms      QT Int : 404 ms       P-R-T Axes : 052 064 017 degrees     QTc Int : 423 ms    Sinus rhythm with 1st degree A-V block  Septal infarct (cited on or before 02-NOV-2020)  Abnormal ECG  When compared with ECG of 04-FEB-2022 01:18,  No significant change was found  Confirmed by Karla Sam MD ([id number]) on 2/5/2022 12:28:23 AM    Referred By: [email or web address]   SELF           Confirmed By:Karla Sam MD              Current Assessment & Plan     Continue lisinopril and hydrochlorothiazide.Counseled on importance of hypertension disease course, I recommend ongoing Education for DASH-diet and exercise.  Counseled on medication regimen importance of treating high blood pressure.  Please be advised of risk of untreated blood pressure as discussed.  Please advised of ER precautions were given for symptoms of hypertensive urgency and emergency.           Hyperlipidemia associated with type 2 diabetes mellitus (Chronic)    Overview     December 2022:  Reviewed labs.  Initial HPI:  Chronic.  Control is uncertain.  Due for labs.  On atorvastatin 20 mg.  Reports compliance.  No side effects reported.  November 2019:  Patient has been on  increased dose of atorvastatin 40 mg and is not having any side effects currently.  September 2020:  Compliant with medications.  Patient is due for fasting lipid panel.  December 2020:  Patient is due for updated lipid panel.  Will plan for fasting blood work.  Continue medication regimen.  CHRONIC. STABLE. Lab analysis reviewed.   (-) CP, SOB, abdominal pain, N/V/D, constipation, jaundice, skin changes.  (-) Myalgias  Lab Results   Component Value Date    CHOL 193 11/08/2021    CHOL 176 07/15/2021    CHOL 160 11/22/2019     Lab Results   Component Value Date    HDL 64 (H) 11/08/2021    HDL 49 07/15/2021    HDL 53 11/22/2019     Lab Results   Component Value Date    LDLCALC 114 11/08/2021    LDLCALC 112 07/15/2021    LDLCALC 89.0 11/22/2019     Lab Results   Component Value Date    TRIG 75 11/08/2021    TRIG 76 07/15/2021    TRIG 90 11/22/2019     Lab Results   Component Value Date    CHOLHDL 3.02 11/08/2021    CHOLHDL 3.59 07/15/2021    CHOLHDL 33.1 11/22/2019     Lab Results   Component Value Date    TOTALCHOLEST 3.0 11/22/2019    TOTALCHOLEST 3.2 08/05/2019    TOTALCHOLEST 4.0 06/03/2017       Lab Results   Component Value Date    ALT 19 06/20/2022    AST 17 06/20/2022    ALKPHOS 44 (L) 06/20/2022    BILITOT 0.8 06/20/2022     ======================================================  The 10-year ASCVD risk score (Johan LARA, et al., 2019) is: 24.9%    Values used to calculate the score:      Age: 62 years      Sex: Male      Is Non- : Yes      Diabetic: Yes      Tobacco smoker: No      Systolic Blood Pressure: 126 mmHg      Is BP treated: Yes      HDL Cholesterol: 64 mg/dL      Total Cholesterol: 193 mg/dL           Current Assessment & Plan     Update fasting lipid panel.  Consider increasing atorvastatin to 80 milligrams to lower LDL less than 70 with his risk factors.  Counseled on hyperlipidemia disease course, healthy diet and increased need for exercise.  Please be advised of the risk  of cardiovascular disease, increase stroke and heart attack risk with uncontrolled/untreated hyperlipidemia.     Patient voiced understanding and understood the treatment plan. All questions were answered.              Encounter for long-term (current) use of medications (Chronic)    Overview     December 2022: Reviewed labs.  08/06/2019 : CHRONIC long-term drug therapy for managed conditions. See medication list. Reports compliance.  No side effects reported.  Routine lab work is being monitored.  Patient does  need refills today.   November 2019:  CHRONIC. Stable. Compliant with medications for managed conditions. See medication list. No SE reported. Routine lab analysis is being monitored. Refills were addressed.  SEPTEMBER 2020:  Reviewed labs.  CHRONIC. Stable. Compliant with medications for managed conditions. See medication list. No SE reported. Routine lab analysis is being monitored. Refills were addressed.  DECEMBER 2020:  Reviewed labs.  CHRONIC. Stable. Compliant with medications for managed conditions. See medication list. No SE reported. Routine lab analysis is being monitored. Refills were addressed.  June 2022:  Reviewed labs.  Lab Results   Component Value Date    WBC 6.78 02/04/2022    HGB 15.7 02/04/2022    HCT 47.4 02/04/2022    MCV 88 02/04/2022     02/04/2022       Chemistry        Component Value Date/Time     06/20/2022 1359    K 4.0 06/20/2022 1359     06/20/2022 1359    CO2 29 06/20/2022 1359    BUN 22 06/20/2022 1359    CREATININE 1.1 06/20/2022 1359    GLU 59 (L) 06/20/2022 1359        Component Value Date/Time    CALCIUM 10.5 06/20/2022 1359    ALKPHOS 44 (L) 06/20/2022 1359    AST 17 06/20/2022 1359    ALT 19 06/20/2022 1359    BILITOT 0.8 06/20/2022 1359    ESTGFRAFRICA >60.0 06/20/2022 1359    EGFRNONAA >60.0 06/20/2022 1359          Lab Results   Component Value Date    TSH 0.796 08/05/2019    A7NIPYH 7.2 08/05/2019    T3FREE 2.6 08/05/2019      =================================================         Current Assessment & Plan     Complete history and physical was completed today.  Complete and thorough medication reconciliation was performed.  Discussed risks and benefits of medications.  Advised patient on orders and health maintenance.  We discussed old records and old labs if available.  Will request any records not available through epic.  Continue current medications listed on your summary sheet.           Type 2 diabetes mellitus with diabetic peripheral angiopathy without gangrene, without long-term current use of insulin - Primary (Chronic)    Overview     December 2022: Patient reports he was unable to get the Ozempic.  He is still taking metformin.  Patient denies any history of thyroid cancer, pancreatitis, MEN syndrome.     See other diabetes problem.  Patient does follow with pain management.  He is on gabapentin 300 , three times daily    Diabetes Management Status    Statin: Taking  ACE/ARB: Taking    Screening or Prevention Patient's value Goal Complete/Controlled?   HgA1C Testing and Control   Lab Results   Component Value Date    HGBA1C 7.9 (H) 09/21/2022      Annually/Less than 8% Yes   Lipid profile : 11/08/2021 Annually No   LDL control Lab Results   Component Value Date    LDLCALC 114 11/08/2021    Annually/Less than 100 mg/dl  No   Nephropathy screening Lab Results   Component Value Date    LABMICR 6.0 06/20/2022     Lab Results   Component Value Date    PROTEINUA Negative 05/14/2021     No results found for: UTPCR   Annually Yes   Blood pressure BP Readings from Last 1 Encounters:   12/20/22 126/68    Less than 140/90 Yes   Dilated retinal exam : 09/07/2021 Annually No   Foot exam   : 04/12/2022 Annually Yes              Current Assessment & Plan     Start Ozempic.  Continue gabapentin.  Follow-up with pain management.    We will plan to monitor hemoglobin A1c at designated intervals 3 to 6 months.  I recommend ongoing  Education for diabetic diet and exercise protocol.  We will continue to monitor for side effects.    Please be advised of symptoms to monitor for and to notify me immediately if persistent or worsening.  Follow up with Ophthalmology/Optometry and Podiatry at least annually.           Dizziness    Overview     Chronic.  Intermittent control.  Patient takes meclizine as needed for dizziness which does help.  Patient requesting refill.         Current Assessment & Plan     Refill meclizine.  Discussed risk and benefits of medication use.  Discussed condition course.  Follow-up sooner if no improvement.  Consider ENT/vestibular therapy if needed.             Review of patient's allergies indicates:  No Known Allergies  Current Outpatient Medications   Medication Instructions    aspirin (ECOTRIN) 81 mg, Oral, Daily    atorvastatin (LIPITOR) 40 mg, Oral, Daily    gabapentin (NEURONTIN) 300 mg, Oral, 3 times daily    glimepiride (AMARYL) 8 mg, Oral, With breakfast    hydroCHLOROthiazide (HYDRODIURIL) 25 mg, Oral, Daily    hydrocodone-chlorpheniramine (TUSSIONEX) 10-8 mg/5 mL suspension 5 mLs, Oral, Every 12 hours PRN    lisinopriL (PRINIVIL,ZESTRIL) 5 mg, Oral, Daily    meclizine (ANTIVERT) 12.5 mg tablet TAKE 2 TABLETS BY MOUTH THREE TIMES DAILY AS NEEDED FOR DIZZINESS    metFORMIN (GLUCOPHAGE-XR) 1,000 mg, Oral, 2 times daily with meals    mv-min-folic acid-lutein 200-137.5 mcg Chew Adult Multivitamin (w-lutein)   1 tablet by mouth daily    semaglutide (OZEMPIC) 0.25 mg or 0.5 mg(2 mg/1.5 mL) pen injector Inject 0.25 mg into the skin once a week for 30 days, THEN 0.5 mg once a week.    tamsulosin (FLOMAX) 0.4 mg Cap 1 capsule, Oral, Nightly      I have reviewed the PMH, social history, FamilyHx, surgical history, allergies and medications documented / confirmed by the patient at the time of this visit.  Review of Systems   Constitutional:  Negative for chills, fatigue, fever and unexpected weight change.   HENT:   "Negative for ear pain and sore throat.    Eyes:  Negative for redness and visual disturbance.   Respiratory:  Negative for cough and shortness of breath.    Cardiovascular:  Negative for chest pain and palpitations.   Gastrointestinal:  Negative for nausea and vomiting.   Endocrine: Negative for cold intolerance and heat intolerance.   Genitourinary:  Negative for difficulty urinating and hematuria.   Musculoskeletal:  Positive for arthralgias. Negative for myalgias.   Skin:  Negative for rash and wound.   Allergic/Immunologic: Negative for environmental allergies and food allergies.   Neurological:  Negative for weakness and headaches.   Hematological:  Negative for adenopathy. Does not bruise/bleed easily.   Psychiatric/Behavioral:  Negative for sleep disturbance. The patient is not nervous/anxious.    Objective:   /68   Pulse 79   Temp 98.2 °F (36.8 °C) (Other (see comments))   Ht 5' 9" (1.753 m)   Wt 91.6 kg (202 lb)   SpO2 98%   BMI 29.83 kg/m²   Physical Exam  Vitals and nursing note reviewed.   Constitutional:       General: He is not in acute distress.     Appearance: He is well-developed. He is not diaphoretic.   HENT:      Head: Normocephalic and atraumatic.      Right Ear: External ear normal.      Left Ear: External ear normal.      Nose: Nose normal. No rhinorrhea.   Eyes:      Extraocular Movements: Extraocular movements intact.      Pupils: Pupils are equal, round, and reactive to light.   Cardiovascular:      Rate and Rhythm: Normal rate.      Pulses: Normal pulses.   Pulmonary:      Effort: Pulmonary effort is normal. No respiratory distress.      Breath sounds: Normal breath sounds.   Abdominal:      General: Bowel sounds are normal.      Palpations: Abdomen is soft.   Musculoskeletal:         General: Normal range of motion.      Cervical back: Normal range of motion and neck supple.   Skin:     General: Skin is warm and dry.      Capillary Refill: Capillary refill takes less than 2 " seconds.      Findings: No rash.   Neurological:      General: No focal deficit present.      Mental Status: He is alert and oriented to person, place, and time. Mental status is at baseline.      Cranial Nerves: No cranial nerve deficit.      Motor: No weakness.      Gait: Gait normal.   Psychiatric:         Attention and Perception: He is attentive.         Mood and Affect: Mood normal. Mood is not anxious or depressed. Affect is not labile, blunt, angry or inappropriate.         Speech: He is communicative. Speech is not rapid and pressured, delayed, slurred or tangential.         Behavior: Behavior normal. Behavior is not agitated, slowed, aggressive, withdrawn, hyperactive or combative.         Thought Content: Thought content normal. Thought content is not paranoid or delusional. Thought content does not include homicidal or suicidal ideation. Thought content does not include homicidal or suicidal plan.         Cognition and Memory: Memory is not impaired.         Judgment: Judgment normal. Judgment is not impulsive or inappropriate.       Assessment:     1. Type 2 diabetes mellitus with diabetic peripheral angiopathy without gangrene, without long-term current use of insulin    2. Dizziness    3. Encounter for long-term (current) use of medications    4. Hypertension associated with diabetes    5. Hyperlipidemia associated with type 2 diabetes mellitus      MDM:   Moderate complexity.  Moderate risk.  Total time:32 minutes.  This includes total time spent on the encounter, which includes face to face time and non-face to face time preparing to see the patient (eg, review of previous medical records, tests), Obtaining and/or reviewing separately obtained history, documenting clinical information in the electronic or other health record, independently interpreting results (not separately reported)/communicating results to the patient/family/caregiver, and/or care coordination (not separately reported).    I  have Reviewed and summarized old records.  I have performed thorough medication reconciliation today and discussed risk and benefits of medications.  I have reviewed labs and discussed with patient.  All questions were answered.  I am requesting old records and will review them once they are available.  Podiatry Saurav Tellez  Cardiology Dr. Alba   Adv Neeru Augustine      I have signed for the following orders AND/OR meds.  Orders Placed This Encounter   Procedures    Influenza - Quadrivalent (PF)     Medications Ordered This Encounter   Medications    meclizine (ANTIVERT) 12.5 mg tablet     Sig: TAKE 2 TABLETS BY MOUTH THREE TIMES DAILY AS NEEDED FOR DIZZINESS     Dispense:  30 tablet     Refill:  12    semaglutide (OZEMPIC) 0.25 mg or 0.5 mg(2 mg/1.5 mL) pen injector     Sig: Inject 0.25 mg into the skin once a week for 30 days, THEN 0.5 mg once a week.     Dispense:  1 pen     Refill:  1        Follow up in about 6 months (around 6/20/2023) for diabetes.  Future Appointments       Date Provider Specialty Appt Notes    2/28/2023 Jovani Alba MD Cardiology 6 month f/u    3/20/2023  Lab .          If no improvement in symptoms or symptoms worsen, advised to call/follow-up at clinic or go to ER. Patient voiced understanding and all questions/concerns were addressed.   DISCLAIMER: This note was compiled by using a speech recognition dictation system and therefore please be aware that typographical / speech recognition errors can and do occur.  Please contact me if you see any errors specifically.    Charles Nash M.D.       Office: [phone] [address] [place] [address]  FAX: [phone]

## 2022-12-20 NOTE — PATIENT INSTRUCTIONS
Follow up in about 6 months (around 6/20/2023) for diabetes.     Dear patient,   As a result of recent federal legislation (The Federal Cures Act), you may receive lab or pathology results from your visit in your [facility] account before your physician is able to contact you. Your physician or their representative will relay the results to you with their recommendations at their soonest availability.     If no improvement in symptoms or symptoms worsen, please be advised to call MD, follow-up at clinic and/or go to ER if becomes severe.    Charles Nash M.D.        We Offer TELEHEALTH & Same Day Appointments!   Book your Telehealth appointment with me through my nurse or   Clinic appointments on [employer]!    [address] [place] [address]    Office: [phone]   FAX: [phone]    Check out my Facebook Page and Follow Me at: [email or web address]    Check out my website at [employer] by clicking on: [email or web address]    To Schedule appointments online, go to [email or web address]: [email or web address]

## 2023-01-25 LAB
LEFT EYE DM RETINOPATHY: NEGATIVE
RIGHT EYE DM RETINOPATHY: NEGATIVE

## 2023-02-16 LAB
LEFT EYE DM RETINOPATHY: NEGATIVE
RIGHT EYE DM RETINOPATHY: NEGATIVE

## 2023-02-27 ENCOUNTER — PATIENT OUTREACH (OUTPATIENT)
Dept: ADMINISTRATIVE | Facility: HOSPITAL | Age: 63
End: 2023-02-27
Payer: COMMERCIAL

## 2023-03-20 ENCOUNTER — LAB VISIT (OUTPATIENT)
Dept: LAB | Facility: HOSPITAL | Age: 63
End: 2023-03-20
Attending: FAMILY MEDICINE
Payer: COMMERCIAL

## 2023-03-20 DIAGNOSIS — Z13.220 ENCOUNTER FOR LIPID SCREENING FOR CARDIOVASCULAR DISEASE: ICD-10-CM

## 2023-03-20 DIAGNOSIS — Z13.6 ENCOUNTER FOR LIPID SCREENING FOR CARDIOVASCULAR DISEASE: ICD-10-CM

## 2023-03-20 DIAGNOSIS — Z00.01 ENCOUNTER FOR GENERAL ADULT MEDICAL EXAMINATION WITH ABNORMAL FINDINGS: ICD-10-CM

## 2023-03-20 DIAGNOSIS — Z79.899 ENCOUNTER FOR LONG-TERM (CURRENT) USE OF MEDICATIONS: ICD-10-CM

## 2023-03-20 LAB
ALBUMIN SERPL BCP-MCNC: 3.8 G/DL (ref 3.5–5.2)
ALP SERPL-CCNC: 49 U/L (ref 55–135)
ALT SERPL W/O P-5'-P-CCNC: 17 U/L (ref 10–44)
ANION GAP SERPL CALC-SCNC: 4 MMOL/L (ref 8–16)
AST SERPL-CCNC: 15 U/L (ref 10–40)
BILIRUB SERPL-MCNC: 0.9 MG/DL (ref 0.1–1)
BUN SERPL-MCNC: 15 MG/DL (ref 8–23)
CALCIUM SERPL-MCNC: 10 MG/DL (ref 8.7–10.5)
CHLORIDE SERPL-SCNC: 100 MMOL/L (ref 95–110)
CHOLEST SERPL-MCNC: 159 MG/DL (ref 120–199)
CHOLEST/HDLC SERPL: 3.2 {RATIO} (ref 2–5)
CO2 SERPL-SCNC: 33 MMOL/L (ref 23–29)
CREAT SERPL-MCNC: 1 MG/DL (ref 0.5–1.4)
EST. GFR  (NO RACE VARIABLE): >60 ML/MIN/1.73 M^2
ESTIMATED AVG GLUCOSE: 203 MG/DL (ref 68–131)
GLUCOSE SERPL-MCNC: 152 MG/DL (ref 70–110)
HBA1C MFR BLD: 8.7 % (ref 4–5.6)
HDLC SERPL-MCNC: 50 MG/DL (ref 40–75)
HDLC SERPL: 31.4 % (ref 20–50)
LDLC SERPL CALC-MCNC: 96.2 MG/DL (ref 63–159)
NONHDLC SERPL-MCNC: 109 MG/DL
POTASSIUM SERPL-SCNC: 4.6 MMOL/L (ref 3.5–5.1)
PROT SERPL-MCNC: 6.8 G/DL (ref 6–8.4)
SODIUM SERPL-SCNC: 137 MMOL/L (ref 136–145)
TRIGL SERPL-MCNC: 64 MG/DL (ref 30–150)

## 2023-03-20 PROCEDURE — 80061 LIPID PANEL: CPT | Performed by: FAMILY MEDICINE

## 2023-03-20 PROCEDURE — 36415 COLL VENOUS BLD VENIPUNCTURE: CPT | Mod: PO | Performed by: FAMILY MEDICINE

## 2023-03-20 PROCEDURE — 83036 HEMOGLOBIN GLYCOSYLATED A1C: CPT | Performed by: FAMILY MEDICINE

## 2023-03-20 PROCEDURE — 80053 COMPREHEN METABOLIC PANEL: CPT | Performed by: FAMILY MEDICINE

## 2023-03-23 ENCOUNTER — PATIENT MESSAGE (OUTPATIENT)
Dept: FAMILY MEDICINE | Facility: CLINIC | Age: 63
End: 2023-03-23
Payer: COMMERCIAL

## 2023-03-23 NOTE — PROGRESS NOTES
Make follow-up lab appointment per recommendation below.  Check to see if patient has seen the results through my chart.  If not then,  #CALL THE PATIENT# to discuss results/see if they have questions and document verification of contact. Make F/U appt if needed. [phone]    #My interpretation that was sent to them through [employer]:  Lusi, I have reviewed your recent blood work.  Please follow-up soon for abnormal blood work.    Your metabolic panel which shows your glucose, kidney function, electrolytes, and liver function is stable.   Your cholesterol is stable.    Your hemoglobin A1c is elevated.  Follow-up soon for diabetes management.  This test is gold standard screening test for diabetes.  It is a measures 3 months of your average blood sugar.  =========================  Also please address any outstanding health maintenance that may be due: TETANUS VACCINE Never done  PROSTATE-SPECIFIC ANTIGEN due on 01/04/2023  Foot Exam due on 04/12/2023

## 2023-03-24 ENCOUNTER — PATIENT MESSAGE (OUTPATIENT)
Dept: FAMILY MEDICINE | Facility: CLINIC | Age: 63
End: 2023-03-24
Payer: COMMERCIAL

## 2023-04-05 ENCOUNTER — OFFICE VISIT (OUTPATIENT)
Dept: CARDIOLOGY | Facility: CLINIC | Age: 63
End: 2023-04-05
Payer: COMMERCIAL

## 2023-04-05 VITALS
HEART RATE: 82 BPM | OXYGEN SATURATION: 98 % | HEIGHT: 69 IN | WEIGHT: 202.5 LBS | BODY MASS INDEX: 29.99 KG/M2 | DIASTOLIC BLOOD PRESSURE: 70 MMHG | SYSTOLIC BLOOD PRESSURE: 130 MMHG

## 2023-04-05 DIAGNOSIS — I15.2 HYPERTENSION ASSOCIATED WITH DIABETES: Primary | Chronic | ICD-10-CM

## 2023-04-05 DIAGNOSIS — E78.5 HYPERLIPIDEMIA ASSOCIATED WITH TYPE 2 DIABETES MELLITUS: Chronic | ICD-10-CM

## 2023-04-05 DIAGNOSIS — E11.69 HYPERLIPIDEMIA ASSOCIATED WITH TYPE 2 DIABETES MELLITUS: Chronic | ICD-10-CM

## 2023-04-05 DIAGNOSIS — R00.2 PALPITATIONS: ICD-10-CM

## 2023-04-05 DIAGNOSIS — E11.59 HYPERTENSION ASSOCIATED WITH DIABETES: Primary | Chronic | ICD-10-CM

## 2023-04-05 PROCEDURE — 3052F HG A1C>EQUAL 8.0%<EQUAL 9.0%: CPT | Mod: CPTII,S$GLB,, | Performed by: INTERNAL MEDICINE

## 2023-04-05 PROCEDURE — 3008F BODY MASS INDEX DOCD: CPT | Mod: CPTII,S$GLB,, | Performed by: INTERNAL MEDICINE

## 2023-04-05 PROCEDURE — 3078F PR MOST RECENT DIASTOLIC BLOOD PRESSURE < 80 MM HG: ICD-10-PCS | Mod: CPTII,S$GLB,, | Performed by: INTERNAL MEDICINE

## 2023-04-05 PROCEDURE — 99999 PR PBB SHADOW E&M-EST. PATIENT-LVL IV: CPT | Mod: PBBFAC,,, | Performed by: INTERNAL MEDICINE

## 2023-04-05 PROCEDURE — 1160F PR REVIEW ALL MEDS BY PRESCRIBER/CLIN PHARMACIST DOCUMENTED: ICD-10-PCS | Mod: CPTII,S$GLB,, | Performed by: INTERNAL MEDICINE

## 2023-04-05 PROCEDURE — 3078F DIAST BP <80 MM HG: CPT | Mod: CPTII,S$GLB,, | Performed by: INTERNAL MEDICINE

## 2023-04-05 PROCEDURE — 3008F PR BODY MASS INDEX (BMI) DOCUMENTED: ICD-10-PCS | Mod: CPTII,S$GLB,, | Performed by: INTERNAL MEDICINE

## 2023-04-05 PROCEDURE — 3075F PR MOST RECENT SYSTOLIC BLOOD PRESS GE 130-139MM HG: ICD-10-PCS | Mod: CPTII,S$GLB,, | Performed by: INTERNAL MEDICINE

## 2023-04-05 PROCEDURE — 3075F SYST BP GE 130 - 139MM HG: CPT | Mod: CPTII,S$GLB,, | Performed by: INTERNAL MEDICINE

## 2023-04-05 PROCEDURE — 3052F PR MOST RECENT HEMOGLOBIN A1C LEVEL 8.0 - < 9.0%: ICD-10-PCS | Mod: CPTII,S$GLB,, | Performed by: INTERNAL MEDICINE

## 2023-04-05 PROCEDURE — 1160F RVW MEDS BY RX/DR IN RCRD: CPT | Mod: CPTII,S$GLB,, | Performed by: INTERNAL MEDICINE

## 2023-04-05 PROCEDURE — 1159F PR MEDICATION LIST DOCUMENTED IN MEDICAL RECORD: ICD-10-PCS | Mod: CPTII,S$GLB,, | Performed by: INTERNAL MEDICINE

## 2023-04-05 PROCEDURE — 1159F MED LIST DOCD IN RCRD: CPT | Mod: CPTII,S$GLB,, | Performed by: INTERNAL MEDICINE

## 2023-04-05 PROCEDURE — 99214 PR OFFICE/OUTPT VISIT, EST, LEVL IV, 30-39 MIN: ICD-10-PCS | Mod: S$GLB,,, | Performed by: INTERNAL MEDICINE

## 2023-04-05 PROCEDURE — 99214 OFFICE O/P EST MOD 30 MIN: CPT | Mod: S$GLB,,, | Performed by: INTERNAL MEDICINE

## 2023-04-05 PROCEDURE — 99999 PR PBB SHADOW E&M-EST. PATIENT-LVL IV: ICD-10-PCS | Mod: PBBFAC,,, | Performed by: INTERNAL MEDICINE

## 2023-04-05 PROCEDURE — 4010F PR ACE/ARB THEARPY RXD/TAKEN: ICD-10-PCS | Mod: CPTII,S$GLB,, | Performed by: INTERNAL MEDICINE

## 2023-04-05 PROCEDURE — 4010F ACE/ARB THERAPY RXD/TAKEN: CPT | Mod: CPTII,S$GLB,, | Performed by: INTERNAL MEDICINE

## 2023-04-05 NOTE — PROGRESS NOTES
Subjective:   Patient ID:  Luis Lozano is a 62 y.o. male who presents for follow-up of Hospital Follow Up  Patient denies CP, angina or anginal equivalent. BP and lipids are at goal  2017 stress normal  Hypertension  This is a chronic problem. The current episode started more than 1 year ago. The problem has been gradually improving since onset. The problem is controlled. Pertinent negatives include no chest pain, palpitations or shortness of breath. Past treatments include ACE inhibitors and diuretics. The current treatment provides moderate improvement. There are no compliance problems.    Hyperlipidemia  This is a chronic problem. The current episode started more than 1 year ago. The problem is controlled. Pertinent negatives include no chest pain or shortness of breath. Current antihyperlipidemic treatment includes statins. The current treatment provides moderate improvement of lipids. There are no compliance problems.    Chest Pain   This is a new problem. The current episode started 1 to 4 weeks ago. The problem occurs intermittently. The problem has been waxing and waning. The pain is present in the substernal region. The pain is mild. The quality of the pain is described as dull. The pain does not radiate. Pertinent negatives include no dizziness, palpitations or shortness of breath. The treatment provided mild relief.   His past medical history is significant for hyperlipidemia.   Pertinent negatives for past medical history include no muscle weakness.     Review of Systems   Constitutional: Negative. Negative for weight gain.   HENT: Negative.     Eyes: Negative.    Cardiovascular: Negative.  Negative for chest pain, leg swelling and palpitations.   Respiratory: Negative.  Negative for shortness of breath.    Endocrine: Negative.    Hematologic/Lymphatic: Negative.    Skin: Negative.    Musculoskeletal:  Negative for muscle weakness.   Gastrointestinal: Negative.    Genitourinary: Negative.     Neurological: Negative.  Negative for dizziness.   Psychiatric/Behavioral: Negative.     Allergic/Immunologic: Negative.    All other systems reviewed and are negative.  Family History   Problem Relation Age of Onset    Diabetes Mother             Heart disease Mother     Lung cancer Father     Hypertension Maternal Grandmother      Past Medical History:   Diagnosis Date    Diabetes mellitus, type 2     Hypertension     Spermatocele 9/3/2019     Social History     Socioeconomic History    Marital status:    Tobacco Use    Smoking status: Never    Smokeless tobacco: Never   Substance and Sexual Activity    Alcohol use: No    Drug use: No    Sexual activity: Yes     Partners: Female   Social History Narrative    ** Merged History Encounter **          Current Outpatient Medications on File Prior to Visit   Medication Sig Dispense Refill    aspirin (ECOTRIN) 81 MG EC tablet Take 1 tablet (81 mg total) by mouth once daily.      atorvastatin (LIPITOR) 40 MG tablet Take 1 tablet (40 mg total) by mouth once daily. 90 tablet 4    famotidine (PEPCID) 20 MG tablet Take 1 tablet (20 mg total) by mouth 2 (two) times daily. 20 tablet 0    gabapentin (NEURONTIN) 300 MG capsule Take 1 capsule (300 mg total) by mouth 3 (three) times daily. 270 capsule 3    glimepiride (AMARYL) 4 MG tablet Take 2 tablets (8 mg total) by mouth daily with breakfast. 180 tablet 4    hydroCHLOROthiazide (HYDRODIURIL) 25 MG tablet Take 1 tablet (25 mg total) by mouth once daily. 90 tablet 4    lisinopriL (PRINIVIL,ZESTRIL) 5 MG tablet Take 1 tablet (5 mg total) by mouth once daily. 90 tablet 3    meclizine (ANTIVERT) 12.5 mg tablet TAKE 2 TABLETS BY MOUTH THREE TIMES DAILY AS NEEDED FOR DIZZINESS 30 tablet 12    metFORMIN (GLUCOPHAGE-XR) 500 MG ER 24hr tablet Take 2 tablets (1,000 mg total) by mouth 2 (two) times daily with meals. 360 tablet 4    sucralfate (CARAFATE) 1 gram tablet Take 1 tablet (1 g total) by mouth 4 (four) times daily  before meals and nightly. 30 tablet 1    hydrocodone-chlorpheniramine (TUSSIONEX) 10-8 mg/5 mL suspension Take 5 mLs by mouth every 12 (twelve) hours as needed for Cough. 70 mL 0    mv-min-folic acid-lutein 200-137.5 mcg Chew Adult Multivitamin (w-lutein)   1 tablet by mouth daily      [DISCONTINUED] tamsulosin (FLOMAX) 0.4 mg Cap Take 1 capsule by mouth nightly.       No current facility-administered medications on file prior to visit.     Review of patient's allergies indicates:  No Known Allergies    Objective:     Physical Exam  Vitals and nursing note reviewed.   Constitutional:       Appearance: He is well-developed.   HENT:      Head: Normocephalic and atraumatic.   Eyes:      Conjunctiva/sclera: Conjunctivae normal.      Pupils: Pupils are equal, round, and reactive to light.   Cardiovascular:      Rate and Rhythm: Normal rate and regular rhythm.      Pulses: Intact distal pulses.      Heart sounds: Normal heart sounds.   Pulmonary:      Effort: Pulmonary effort is normal.      Breath sounds: Normal breath sounds.   Abdominal:      General: Bowel sounds are normal.      Palpations: Abdomen is soft.   Musculoskeletal:      Cervical back: Normal range of motion and neck supple.   Skin:     General: Skin is warm and dry.   Neurological:      Mental Status: He is alert and oriented to person, place, and time.       Assessment:     1. Hypertension associated with diabetes    2. Hyperlipidemia associated with type 2 diabetes mellitus    3. Palpitations        Plan:     Hypertension associated with diabetes    Hyperlipidemia associated with type 2 diabetes mellitus    Palpitations      Continue lisinopril, hctz-htn  Continue statin-hlp

## 2023-05-05 ENCOUNTER — PATIENT MESSAGE (OUTPATIENT)
Dept: FAMILY MEDICINE | Facility: CLINIC | Age: 63
End: 2023-05-05

## 2023-05-05 ENCOUNTER — E-VISIT (OUTPATIENT)
Dept: FAMILY MEDICINE | Facility: CLINIC | Age: 63
End: 2023-05-05
Payer: MEDICARE

## 2023-05-05 DIAGNOSIS — K21.9 GASTROESOPHAGEAL REFLUX DISEASE WITHOUT ESOPHAGITIS: ICD-10-CM

## 2023-05-05 DIAGNOSIS — K21.9 GASTROESOPHAGEAL REFLUX DISEASE WITHOUT ESOPHAGITIS: Primary | ICD-10-CM

## 2023-05-05 PROCEDURE — 99499 NO LOS: ICD-10-PCS | Mod: ,,, | Performed by: NURSE PRACTITIONER

## 2023-05-05 PROCEDURE — 99499 UNLISTED E&M SERVICE: CPT | Mod: ,,, | Performed by: NURSE PRACTITIONER

## 2023-05-05 RX ORDER — FAMOTIDINE 20 MG/1
20 TABLET, FILM COATED ORAL 2 TIMES DAILY
Qty: 20 TABLET | Refills: 0 | OUTPATIENT
Start: 2023-05-05 | End: 2024-05-04

## 2023-05-05 RX ORDER — FAMOTIDINE 20 MG/1
20 TABLET, FILM COATED ORAL 2 TIMES DAILY
Qty: 20 TABLET | Refills: 0 | OUTPATIENT
Start: 2023-05-05 | End: 2023-05-05 | Stop reason: SDUPTHER

## 2023-05-05 RX ORDER — FAMOTIDINE 20 MG/1
20 TABLET, FILM COATED ORAL 2 TIMES DAILY
Qty: 20 TABLET | Refills: 0 | Status: SHIPPED | OUTPATIENT
Start: 2023-05-05 | End: 2023-05-09 | Stop reason: SDUPTHER

## 2023-05-05 RX ORDER — SUCRALFATE 1 G/1
1 TABLET ORAL
Qty: 30 TABLET | Refills: 1 | OUTPATIENT
Start: 2023-05-05 | End: 2023-05-05 | Stop reason: SDUPTHER

## 2023-05-05 NOTE — PROGRESS NOTES
Patient ID: Luis Lozano is a 62 y.o. male.    Chief Complaint: Gastroesophageal Reflux    The patient initiated a request through [employer] on 5/5/2023 for evaluation and management with a chief complaint of Gastroesophageal Reflux     I evaluated the questionnaire submission on 5/5/23.    [facility] Peq Evisit Diarrhea    5/5/2023 10:28 AM CDT - Filed by Patient   Do you agree to participate in an E-Visit? Yes   If you have any of the following symptoms, please present to your local ER or call 911:  I acknowledge   What is the main issue that you would like for your doctor to address today? famotidine 20 MG tablet PEPCID, Sucralfate 1 gram tablet CARAFATE  I need refills on both these meds. I had a sandwich that had hot pepper on it that I did not know.   Are you able to take your vital signs? Yes   Systolic Blood Pressure: 116   Diastolic Blood Pressure: 74   Weight: 202   Height: 5.9   Pulse: 77   Temperature: 97.8   Respiration rate: 97   Pulse Oxygen: 81   Do you have diarrhea? No   Are you vomiting? No, I am not vomiting   Do you have belly pain? I have pain in the lower part of my belly   Are you feeling dizzy or like you might pass out? No   When did your symptoms begin? 5/4/2023   Do you have a fever? No, I do not have a fever   Are you having trouble walking or lifting yourself due to weakness from this illness?  No   Do any of the following apply to you? My urine is normal   Did your condition begin after a specific meal that may have caused the illness? Yes, almost immediately   Please enter some information about your meal, including what you ate that may have caused your illness. hot peppers mixed on my sandwich.    Have you taken antibiotics recently? I am not sure   Have you been hospitalized in the past 2 months? No, I have not been hospitalized recently.   Do you work in a  center or healthcare environment? No   Does anyone you know have similar symptoms? No   Have you had a meal consisting of  raw meat or fish in the week prior to your illness? No   Have you recently travelled to a place where you may have caught an illness? No   Have you tried any medication or other treatment for your symptoms? Yes   Please list the treatments you tried, and the result of those treatments. Pepto Bismol   Provide any information you feel is important to your history not asked above These prescription were given to me at my last emergency room visit at [facility] and they worked great for my problem then.   Please attach any relevant images or files         Active Problem List with Overview Notes    Diagnosis Date Noted    Nausea 06/21/2022     Patient needing a refill on Zofran        Type 2 diabetes mellitus with diabetic peripheral angiopathy without gangrene, without long-term current use of insulin 06/21/2022 December 2022: Patient reports he was unable to get the Ozempic.  He is still taking metformin.  Patient denies any history of thyroid cancer, pancreatitis, MEN syndrome.     See other diabetes problem.  Patient does follow with pain management.  He is on gabapentin 300 , three times daily    Diabetes Management Status    Statin: Taking  ACE/ARB: Taking    Screening or Prevention Patient's value Goal Complete/Controlled?   HgA1C Testing and Control   Lab Results   Component Value Date    HGBA1C 7.9 (H) 09/21/2022      Annually/Less than 8% Yes   Lipid profile : 11/08/2021 Annually No   LDL control Lab Results   Component Value Date    LDLCALC 114 11/08/2021    Annually/Less than 100 mg/dl  No   Nephropathy screening Lab Results   Component Value Date    LABMICR 6.0 06/20/2022     Lab Results   Component Value Date    PROTEINUA Negative 05/14/2021     No results found for: UTPCR   Annually Yes   Blood pressure BP Readings from Last 1 Encounters:   12/20/22 126/68    Less than 140/90 Yes   Dilated retinal exam : 09/07/2021 Annually No   Foot exam   : 04/12/2022 Annually Yes           Bilateral plantar  fasciitis 11/11/2021    Benign prostatic hyperplasia 02/11/2021    Gastroesophageal reflux disease without esophagitis 02/11/2021    Neuropathy 02/11/2021    Dizziness 01/12/2021     Chronic.  Intermittent control.  Patient takes meclizine as needed for dizziness which does help.  Patient requesting refill.        Palpitations 01/12/2021    Left wrist pain 09/09/2020     Subacute.  Started a couple of weeks ago when patient had a type fall on to his left wrist.  Patient reports that it did not hurt right away and did not swell.  After few days it started to ache and is still hurting to this day.  Patient has been taking ibuprofen and had x-ray at an orthopedic office but I do not have the records.  Patient was told that the x-ray was negative.  Denies any weakness of the hand.        Hyperlipidemia associated with type 2 diabetes mellitus 08/05/2019 December 2022:  Reviewed labs.  Initial HPI:  Chronic.  Control is uncertain.  Due for labs.  On atorvastatin 20 mg.  Reports compliance.  No side effects reported.  November 2019:  Patient has been on increased dose of atorvastatin 40 mg and is not having any side effects currently.  September 2020:  Compliant with medications.  Patient is due for fasting lipid panel.  December 2020:  Patient is due for updated lipid panel.  Will plan for fasting blood work.  Continue medication regimen.  CHRONIC. STABLE. Lab analysis reviewed.   (-) CP, SOB, abdominal pain, N/V/D, constipation, jaundice, skin changes.  (-) Myalgias  Lab Results   Component Value Date    CHOL 193 11/08/2021    CHOL 176 07/15/2021    CHOL 160 11/22/2019     Lab Results   Component Value Date    HDL 64 (H) 11/08/2021    HDL 49 07/15/2021    HDL 53 11/22/2019     Lab Results   Component Value Date    LDLCALC 114 11/08/2021    LDLCALC 112 07/15/2021    LDLCALC 89.0 11/22/2019     Lab Results   Component Value Date    TRIG 75 11/08/2021    TRIG 76 07/15/2021    TRIG 90 11/22/2019     Lab Results    Component Value Date    CHOLHDL 3.02 11/08/2021    CHOLHDL 3.59 07/15/2021    CHOLHDL 33.1 11/22/2019     Lab Results   Component Value Date    TOTALCHOLEST 3.0 11/22/2019    TOTALCHOLEST 3.2 08/05/2019    TOTALCHOLEST 4.0 06/03/2017       Lab Results   Component Value Date    ALT 19 06/20/2022    AST 17 06/20/2022    ALKPHOS 44 (L) 06/20/2022    BILITOT 0.8 06/20/2022     ======================================================  The 10-year ASCVD risk score (Johan LARA, et al., 2019) is: 24.9%    Values used to calculate the score:      Age: 62 years      Sex: Male      Is Non- : Yes      Diabetic: Yes      Tobacco smoker: No      Systolic Blood Pressure: 126 mmHg      Is BP treated: Yes      HDL Cholesterol: 64 mg/dL      Total Cholesterol: 193 mg/dL        Type 2 diabetes mellitus with hyperglycemia, without long-term current use of insulin 08/05/2019 June 2022:  Patient has been lost to follow-up since December of 2020. Today we had a long discussion about new or medications such as Ozempic try to get him off of some of the oral medication and avoid progression to insulin.  Patient and his wife were in agreement with trying this medication.  He denies any history of pancreatitis, thyroid cancer, MEN syndrome.  November 2019:  ReviewedDiabetes Management StatusStatin: TakingACE/ARB: Not taking  MARCH 2020:  Reviewed Diabetes Management StatusStatin: TakingACE/ARB: Taking  SEPTEMBER 2020:  Reviewed Diabetes Management Status Statin: TakingACE/ARB: Taking  December 2020:  Reviewed Diabetes Management StatusStatin: TakingACE/ARB: Taking patient admits to diet noncompliance and medication not being taken as prescribed.  Patient reports that he decrease the dosage of metformin on his own.  Diabetes Management Status    Statin: Taking  ACE/ARB: Taking    Screening or Prevention Patient's value Goal Complete/Controlled?   HgA1C Testing and Control   Lab Results   Component Value Date    HGBA1C  8.3 (H) 06/20/2022      Annually/Less than 8% No   Lipid profile : 11/08/2021 Annually No   LDL control Lab Results   Component Value Date    LDLCALC 114 11/08/2021    Annually/Less than 100 mg/dl  No   Nephropathy screening Lab Results   Component Value Date    LABMICR 6.0 06/20/2022     Lab Results   Component Value Date    PROTEINUA Negative 05/14/2021     No results found for: UTPCR   Annually Yes   Blood pressure BP Readings from Last 1 Encounters:   06/20/22 128/84    Less than 140/90 Yes   Dilated retinal exam : 09/07/2021 Annually Yes   Foot exam   : 09/09/2020 Annually No         Radiculopathy, lumbar region 08/05/2019     Chronic.  Stable.  Patient sees pain management.  Taking gabapentin 300 mg per medication list.  Needs refill.  Reports compliance.  No side effects reported.        Vitamin D deficiency 08/05/2019     Chronic.  Control is uncertain.  Due for vitamin-D level.          Encounter for long-term (current) use of medications 08/05/2019 December 2022: Reviewed labs.  08/06/2019 : CHRONIC long-term drug therapy for managed conditions. See medication list. Reports compliance.  No side effects reported.  Routine lab work is being monitored.  Patient does  need refills today.   November 2019:  CHRONIC. Stable. Compliant with medications for managed conditions. See medication list. No SE reported. Routine lab analysis is being monitored. Refills were addressed.  SEPTEMBER 2020:  Reviewed labs.  CHRONIC. Stable. Compliant with medications for managed conditions. See medication list. No SE reported. Routine lab analysis is being monitored. Refills were addressed.  DECEMBER 2020:  Reviewed labs.  CHRONIC. Stable. Compliant with medications for managed conditions. See medication list. No SE reported. Routine lab analysis is being monitored. Refills were addressed.  June 2022:  Reviewed labs.  Lab Results   Component Value Date    WBC 6.78 02/04/2022    HGB 15.7 02/04/2022    HCT 47.4 02/04/2022     MCV 88 02/04/2022     02/04/2022       Chemistry        Component Value Date/Time     06/20/2022 1359    K 4.0 06/20/2022 1359     06/20/2022 1359    CO2 29 06/20/2022 1359    BUN 22 06/20/2022 1359    CREATININE 1.1 06/20/2022 1359    GLU 59 (L) 06/20/2022 1359        Component Value Date/Time    CALCIUM 10.5 06/20/2022 1359    ALKPHOS 44 (L) 06/20/2022 1359    AST 17 06/20/2022 1359    ALT 19 06/20/2022 1359    BILITOT 0.8 06/20/2022 1359    ESTGFRAFRICA >60.0 06/20/2022 1359    EGFRNONAA >60.0 06/20/2022 1359          Lab Results   Component Value Date    TSH 0.796 08/05/2019    R3EXRKH 7.2 08/05/2019    T3FREE 2.6 08/05/2019     =================================================      Hypertension associated with diabetes 09/23/2013 December 2022: Blood pressure is well controlled.  Hypertension Medications               hydroCHLOROthiazide (HYDRODIURIL) 25 MG tablet Take 1 tablet (25 mg total) by mouth once daily.    lisinopriL (PRINIVIL,ZESTRIL) 5 MG tablet Take 1 tablet (5 mg total) by mouth once daily.            June 2022:  Patient has been lost to follow-up since December of 2020. Blood pressure remains controlled at this time on current medication regimen.    December 2020:  Blood pressure is well controlled today.  Patient reports compliance with medication regimen.  SEPTEMBER 2020:  Patient with recent elevations in his blood pressures.  Recently went to ER for chest pain but was given medication for reflux that resolve the pain.  Patient has been doing well since then.  CHRONIC. STABLE. BP Reviewed.  Compliant with BP medications. No SE reported.   (-) CP, SOB, palpitations, dizziness, lightheadedness, HA, arm numbness, tingling or weakness, syncope.  Creatinine   Date Value Ref Range Status   06/20/2022 1.1 0.5 - 1.4 mg/dL Final     Initial HPI:  Chronic.  Stable.  Patient taking hydrochlorothiazide 12.5 mg amlodipine 2.5 mg.  Reports compliance.  No side effects reported.  No  symptoms of chest pain shortness of breath blurry vision etc.  November 2019:  Patient reports that he did have episode of vertigo and went to the ER but was given meclizine and Tessalon Perles for the cough.  Has not been having issues since then.CHRONIC. STABLE. BP Reviewed.  Compliant with BP medications. No SE reported.     Results for orders placed or performed during the hospital encounter of 02/04/22   EKG 12-lead    Collection Time: 02/04/22  3:28 AM    Narrative    Test Reason : R07.9,    Vent. Rate : 066 BPM     Atrial Rate : 066 BPM     P-R Int : 220 ms          QRS Dur : 092 ms      QT Int : 404 ms       P-R-T Axes : 052 064 017 degrees     QTc Int : 423 ms    Sinus rhythm with 1st degree A-V block  Septal infarct (cited on or before 02-NOV-2020)  Abnormal ECG  When compared with ECG of 04-FEB-2022 01:18,  No significant change was found  Confirmed by Karla Sam MD ([id number]) on 2/5/2022 12:28:23 AM    Referred By: [email or web address]   SELF           Confirmed By:Karla Sam MD           Hyperlipidemia 09/23/2013     CHRONIC. STABLE. Lab analysis reviewed.   (-) CP, SOB, abdominal pain, N/V/D, constipation, jaundice, skin changes.  (-) Myalgias  Lab Results   Component Value Date    CHOL 193 11/08/2021    CHOL 176 07/15/2021    CHOL 160 11/22/2019     Lab Results   Component Value Date    HDL 64 (H) 11/08/2021    HDL 49 07/15/2021    HDL 53 11/22/2019     Lab Results   Component Value Date    LDLCALC 114 11/08/2021    LDLCALC 112 07/15/2021    LDLCALC 89.0 11/22/2019     Lab Results   Component Value Date    TRIG 75 11/08/2021    TRIG 76 07/15/2021    TRIG 90 11/22/2019     Lab Results   Component Value Date    CHOLHDL 3.02 11/08/2021    CHOLHDL 3.59 07/15/2021    CHOLHDL 33.1 11/22/2019     Lab Results   Component Value Date    TOTALCHOLEST 3.0 11/22/2019    TOTALCHOLEST 3.2 08/05/2019    TOTALCHOLEST 4.0 06/03/2017     Lab Results   Component Value Date    ALT 19 06/20/2022    AST 17 06/20/2022    ALKPHOS 44 (L)  06/20/2022    BILITOT 0.8 06/20/2022   ======================================================  The ASCVD Risk score (Shana LÓPEZ Jr., et al., 2013) failed to calculate for the following reasons:    The patient has a prior MI or stroke diagnosis          Recent Labs Obtained:  No visits with results within 7 Day(s) from this visit.   Latest known visit with results is:   Admission on 03/30/2023, Discharged on 03/31/2023   Component Date Value Ref Range Status    Magnesium 03/30/2023 2.0  1.6 - 2.6 mg/dL Final    WBC 03/30/2023 8.04  3.90 - 12.70 K/uL Final    RBC 03/30/2023 5.36  4.60 - 6.20 M/uL Final    Hemoglobin 03/30/2023 15.7  14.0 - 18.0 g/dL Final    Hematocrit 03/30/2023 46.3  40.0 - 54.0 % Final    MCV 03/30/2023 86  82 - 98 fL Final    MCH 03/30/2023 29.3  27.0 - 31.0 pg Final    MCHC 03/30/2023 33.9  32.0 - 36.0 g/dL Final    RDW 03/30/2023 13.2  11.5 - 14.5 % Final    Platelets 03/30/2023 246  150 - 450 K/uL Final    MPV 03/30/2023 10.3  9.2 - 12.9 fL Final    Immature Granulocytes 03/30/2023 0.4  0.0 - 0.5 % Final    Gran # (ANC) 03/30/2023 4.6  1.8 - 7.7 K/uL Final    Immature Grans (Abs) 03/30/2023 0.03  0.00 - 0.04 K/uL Final    Comment: Mild elevation in immature granulocytes is non specific and   can be seen in a variety of conditions including stress response,   acute inflammation, trauma and pregnancy. Correlation with other   laboratory and clinical findings is essential.      Lymph # 03/30/2023 2.4  1.0 - 4.8 K/uL Final    Mono # 03/30/2023 0.7  0.3 - 1.0 K/uL Final    Eos # 03/30/2023 0.2  0.0 - 0.5 K/uL Final    Baso # 03/30/2023 0.09  0.00 - 0.20 K/uL Final    nRBC 03/30/2023 0  0 /100 WBC Final    Gran % 03/30/2023 57.4  38.0 - 73.0 % Final    Lymph % 03/30/2023 29.7  18.0 - 48.0 % Final    Mono % 03/30/2023 9.0  4.0 - 15.0 % Final    Eosinophil % 03/30/2023 2.4  0.0 - 8.0 % Final    Basophil % 03/30/2023 1.1  0.0 - 1.9 % Final    Differential Method 03/30/2023 Automated   Final    Sodium  03/30/2023 137  136 - 145 mmol/L Final    Potassium 03/30/2023 3.5  3.5 - 5.1 mmol/L Final    Chloride 03/30/2023 98  95 - 110 mmol/L Final    CO2 03/30/2023 32 (H)  22 - 31 mmol/L Final    Glucose 03/30/2023 91  70 - 110 mg/dL Final    Comment: The ADA recommends the following guidelines for fasting glucose:    Normal:       less than 100 mg/dL    Prediabetes:  100 mg/dL to 125 mg/dL    Diabetes:     126 mg/dL or higher      BUN 03/30/2023 20  9 - 21 mg/dL Final    Creatinine 03/30/2023 1.07  0.50 - 1.40 mg/dL Final    Calcium 03/30/2023 9.6  8.4 - 10.2 mg/dL Final    Total Protein 03/30/2023 8.0  6.0 - 8.4 g/dL Final    Albumin 03/30/2023 4.5  3.5 - 5.2 g/dL Final    Total Bilirubin 03/30/2023 0.8  0.2 - 1.3 mg/dL Final    Alkaline Phosphatase 03/30/2023 47  38 - 145 U/L Final    AST 03/30/2023 32  17 - 59 U/L Final    ALT 03/30/2023 23  0 - 50 U/L Final    Anion Gap 03/30/2023 7 (L)  8 - 16 mmol/L Final    eGFR 03/30/2023 >60  >60 mL/min/1.73 m^2 Final    NT-proBNP 03/30/2023 26  5 - 900 pg/mL Final    Comment: Summary of suggested optimal cut-offs for use of NT-proBNp     Situation                 Optimal Cut-offs  Evaluation of heart failure 125 pg/mL for age <75 years  (out-patient/office evaluation) 450 pg/mL for age >=75 years       Evaluation of dyspnea  Exclusion of heart failure      (emergency department)  300 pg/mL, age independent            Diagnosis of heart failure          450 pg/mL for age <50 years         900 pg/mL for age 50-75 years        1800 pg/mL for age >75 years       Use of age stratification does not change either sensitivity   or specificity, but improves positive predictive value   significantly.     Serum concentrations of natriuretic peptides may be elevated   in patients with acute myocardial infarction and renal   insufficiency. Factors such as these should be considered when   interpreting results from any NT-proBNP or BNP method.    treated with animal serum products. Results  which are   The results from this assay should be used and interpreted   only in t                           he context of the overall clinical picture. NT-proBNP   values should be assessed in conjunction with other   cardiovascular risk factors and clinical findings.    Heterophilic antibodies in serum or plasma of certain   individuals are known to cause interference with immunoassays.   These antibodies may be present in the blood samples from   individuals regularly exposed to animals or who have been   treated with animal serum products. Results which are   inconsistent with clinical observation indicate the need for  additional testing.        Troponin I 03/30/2023 <0.012  0.012 - 0.034 ng/mL Final    Comment: Warning:  Samples from patients receiving preparations of   mouse monoclonal antibodies for therapy or diagnosis may   contain Human Anti-Mouse Antibodies (HAMA). Such samples may   show either falsely elevated or falsely depressed values when   tested with this method.    Patients taking very high Biotin doses of >300 mcg/day may   cause a negative bias in this assay.      Lipase Result 03/30/2023 199  23 - 300 U/L Final    Troponin I 03/30/2023 <0.012  0.012 - 0.034 ng/mL Final    Comment: Warning:  Samples from patients receiving preparations of   mouse monoclonal antibodies for therapy or diagnosis may   contain Human Anti-Mouse Antibodies (HAMA). Such samples may   show either falsely elevated or falsely depressed values when   tested with this method.    Patients taking very high Biotin doses of >300 mcg/day may   cause a negative bias in this assay.       Encounter Diagnosis   Name Primary?    Gastroesophageal reflux disease without esophagitis Yes     No orders of the defined types were placed in this encounter.     Medications Ordered This Encounter   Medications    famotidine (PEPCID) 20 MG tablet     Sig: Take 1 tablet (20 mg total) by mouth 2 (two) times daily.     Dispense:  20 tablet      Refill:  0    sucralfate (CARAFATE) 1 gram tablet     Sig: Take 1 tablet (1 g total) by mouth 4 (four) times daily before meals and nightly.     Dispense:  30 tablet     Refill:  1      Refill carafate and pepcid. Continue lifestyle modification with avoidance of acidic foods, caffeine, late night eating, spicy foods, and NSAIDs.     E-Visit Time Tracking: <5 minutes

## 2023-05-05 NOTE — TELEPHONE ENCOUNTER
----- Message from Tobi Garcia sent at 5/5/2023  3:06 PM CDT -----  Contact: Luis  Patient is calling regarding famotidine (PEPCID) 20 MG tablet and sucralfate   Reports stomach is burning and doesn't think he can go throughout the weekend without the medication, patient stated pharmacy still haven't received script and is requesting for it to be called in as soon as possible. Please call patient back at  [phone]

## 2023-05-05 NOTE — TELEPHONE ENCOUNTER
No care due was identified.  [facility] Embedded Care Due Messages. Reference number: [id number].   5/05/2023 4:01:01 PM CDT

## 2023-05-06 NOTE — TELEPHONE ENCOUNTER
Refill Routing Note   Medication(s) are not appropriate for processing by [facility] for the following reason(s):      Medication outside of protocol  Non-participating provider    [facility] action(s):  Quick Discontinue  Route     Medication Therapy Plan: E-Prescribing Status: Receipt confirmed by pharmacy (5/5/2023  4:42 PM CDT)      Appointments  past 12m or future 3m with PCP    Date Provider   Last Visit   Visit date not found Jana Lawrence NP   Next Visit   Visit date not found Jana Lawrence NP   ED visits in past 90 days: 1        Note composed:9:55 PM 05/05/2023

## 2023-05-08 RX ORDER — SUCRALFATE 1 G/1
1 TABLET ORAL
Qty: 30 TABLET | Refills: 1 | OUTPATIENT
Start: 2023-05-08 | End: 2023-05-09 | Stop reason: SDUPTHER

## 2023-05-09 ENCOUNTER — OFFICE VISIT (OUTPATIENT)
Dept: FAMILY MEDICINE | Facility: CLINIC | Age: 63
End: 2023-05-09
Payer: MEDICARE

## 2023-05-09 ENCOUNTER — TELEPHONE (OUTPATIENT)
Dept: FAMILY MEDICINE | Facility: CLINIC | Age: 63
End: 2023-05-09

## 2023-05-09 VITALS
HEART RATE: 80 BPM | OXYGEN SATURATION: 97 % | BODY MASS INDEX: 29.59 KG/M2 | HEIGHT: 69 IN | SYSTOLIC BLOOD PRESSURE: 110 MMHG | DIASTOLIC BLOOD PRESSURE: 70 MMHG | WEIGHT: 199.81 LBS | TEMPERATURE: 97 F

## 2023-05-09 DIAGNOSIS — K21.9 GASTROESOPHAGEAL REFLUX DISEASE WITHOUT ESOPHAGITIS: ICD-10-CM

## 2023-05-09 DIAGNOSIS — Z12.5 ENCOUNTER FOR PROSTATE CANCER SCREENING: ICD-10-CM

## 2023-05-09 DIAGNOSIS — E11.51 TYPE 2 DIABETES MELLITUS WITH DIABETIC PERIPHERAL ANGIOPATHY WITHOUT GANGRENE, WITHOUT LONG-TERM CURRENT USE OF INSULIN: Primary | ICD-10-CM

## 2023-05-09 DIAGNOSIS — R42 LIGHTHEADEDNESS: ICD-10-CM

## 2023-05-09 DIAGNOSIS — Z79.899 ENCOUNTER FOR LONG-TERM (CURRENT) USE OF MEDICATIONS: ICD-10-CM

## 2023-05-09 PROCEDURE — 4010F ACE/ARB THERAPY RXD/TAKEN: CPT | Mod: CPTII,S$GLB,, | Performed by: FAMILY MEDICINE

## 2023-05-09 PROCEDURE — 3288F PR FALLS RISK ASSESSMENT DOCUMENTED: ICD-10-PCS | Mod: CPTII,S$GLB,, | Performed by: FAMILY MEDICINE

## 2023-05-09 PROCEDURE — 1160F RVW MEDS BY RX/DR IN RCRD: CPT | Mod: CPTII,S$GLB,, | Performed by: FAMILY MEDICINE

## 2023-05-09 PROCEDURE — 3078F PR MOST RECENT DIASTOLIC BLOOD PRESSURE < 80 MM HG: ICD-10-PCS | Mod: CPTII,S$GLB,, | Performed by: FAMILY MEDICINE

## 2023-05-09 PROCEDURE — 99999 PR PBB SHADOW E&M-EST. PATIENT-LVL V: ICD-10-PCS | Mod: PBBFAC,,, | Performed by: FAMILY MEDICINE

## 2023-05-09 PROCEDURE — 1160F PR REVIEW ALL MEDS BY PRESCRIBER/CLIN PHARMACIST DOCUMENTED: ICD-10-PCS | Mod: CPTII,S$GLB,, | Performed by: FAMILY MEDICINE

## 2023-05-09 PROCEDURE — 1159F MED LIST DOCD IN RCRD: CPT | Mod: CPTII,S$GLB,, | Performed by: FAMILY MEDICINE

## 2023-05-09 PROCEDURE — 3052F PR MOST RECENT HEMOGLOBIN A1C LEVEL 8.0 - < 9.0%: ICD-10-PCS | Mod: CPTII,S$GLB,, | Performed by: FAMILY MEDICINE

## 2023-05-09 PROCEDURE — 99999 PR PBB SHADOW E&M-EST. PATIENT-LVL V: CPT | Mod: PBBFAC,,, | Performed by: FAMILY MEDICINE

## 2023-05-09 PROCEDURE — 1101F PT FALLS ASSESS-DOCD LE1/YR: CPT | Mod: CPTII,S$GLB,, | Performed by: FAMILY MEDICINE

## 2023-05-09 PROCEDURE — 3074F SYST BP LT 130 MM HG: CPT | Mod: CPTII,S$GLB,, | Performed by: FAMILY MEDICINE

## 2023-05-09 PROCEDURE — 3074F PR MOST RECENT SYSTOLIC BLOOD PRESSURE < 130 MM HG: ICD-10-PCS | Mod: CPTII,S$GLB,, | Performed by: FAMILY MEDICINE

## 2023-05-09 PROCEDURE — 99214 PR OFFICE/OUTPT VISIT, EST, LEVL IV, 30-39 MIN: ICD-10-PCS | Mod: S$GLB,,, | Performed by: FAMILY MEDICINE

## 2023-05-09 PROCEDURE — 1159F PR MEDICATION LIST DOCUMENTED IN MEDICAL RECORD: ICD-10-PCS | Mod: CPTII,S$GLB,, | Performed by: FAMILY MEDICINE

## 2023-05-09 PROCEDURE — 3052F HG A1C>EQUAL 8.0%<EQUAL 9.0%: CPT | Mod: CPTII,S$GLB,, | Performed by: FAMILY MEDICINE

## 2023-05-09 PROCEDURE — 3078F DIAST BP <80 MM HG: CPT | Mod: CPTII,S$GLB,, | Performed by: FAMILY MEDICINE

## 2023-05-09 PROCEDURE — 99214 OFFICE O/P EST MOD 30 MIN: CPT | Mod: S$GLB,,, | Performed by: FAMILY MEDICINE

## 2023-05-09 PROCEDURE — 4010F PR ACE/ARB THEARPY RXD/TAKEN: ICD-10-PCS | Mod: CPTII,S$GLB,, | Performed by: FAMILY MEDICINE

## 2023-05-09 PROCEDURE — 1101F PR PT FALLS ASSESS DOC 0-1 FALLS W/OUT INJ PAST YR: ICD-10-PCS | Mod: CPTII,S$GLB,, | Performed by: FAMILY MEDICINE

## 2023-05-09 PROCEDURE — 3288F FALL RISK ASSESSMENT DOCD: CPT | Mod: CPTII,S$GLB,, | Performed by: FAMILY MEDICINE

## 2023-05-09 PROCEDURE — 3008F PR BODY MASS INDEX (BMI) DOCUMENTED: ICD-10-PCS | Mod: CPTII,S$GLB,, | Performed by: FAMILY MEDICINE

## 2023-05-09 PROCEDURE — 3008F BODY MASS INDEX DOCD: CPT | Mod: CPTII,S$GLB,, | Performed by: FAMILY MEDICINE

## 2023-05-09 RX ORDER — FAMOTIDINE 20 MG/1
20 TABLET, FILM COATED ORAL 2 TIMES DAILY
Qty: 60 TABLET | Refills: 12 | Status: SHIPPED | OUTPATIENT
Start: 2023-05-09 | End: 2023-11-27 | Stop reason: SDUPTHER

## 2023-05-09 RX ORDER — SUCRALFATE 1 G/1
1 TABLET ORAL
Qty: 90 TABLET | Refills: 1 | Status: SHIPPED | OUTPATIENT
Start: 2023-05-09 | End: 2023-11-27 | Stop reason: SDUPTHER

## 2023-05-09 NOTE — ASSESSMENT & PLAN NOTE
Check carotid ultrasound.  Referral back to vestibular therapy.  Follow-up with Cardiology also.  Continue meclizine as needed.  Patient will continue to take his sinus medications.

## 2023-05-09 NOTE — PROGRESS NOTES
PLAN:      Problem List Items Addressed This Visit       Encounter for long-term (current) use of medications (Chronic)     Complete history and physical was completed today.  Complete and thorough medication reconciliation was performed.  Discussed risks and benefits of medications.  Advised patient on orders and health maintenance.  We discussed old records and old labs if available.  Will request any records not available through epic.  Continue current medications listed on your summary sheet.           Lightheadedness (Chronic)     Check carotid ultrasound.  Referral back to vestibular therapy.  Follow-up with Cardiology also.  Continue meclizine as needed.  Patient will continue to take his sinus medications.         Relevant Orders    Ambulatory referral/consult to Physical/Occupational Therapy    US Carotid Bilateral    Type 2 diabetes mellitus with diabetic peripheral angiopathy without gangrene, without long-term current use of insulin - Primary (Chronic)     Continue current medications.We will plan to monitor hemoglobin A1c at designated intervals 3 to 6 months.  I recommend ongoing Education for diabetic diet and exercise protocol.  We will continue to monitor for side effects.    Please be advised of symptoms to monitor for and to notify me immediately if persistent or worsening.  Follow up with Ophthalmology/Optometry and Podiatry at least annually.           Gastroesophageal reflux disease without esophagitis     Continue famotidine.  Carafate as needed.  Follow-up with GI if no improvement in symptoms.  GERD RECOMMENDATIONS  Please be advised of condition course.  - I recommend ongoing Education for lifestyle modifications to help control/reduce reflux/abdominal pain including: avoid large meals, avoid eating within 2-3 hours of bedtime (avoid late night eating & lying down soon after eating), elevate head of bed if nocturnal symptoms are present, smoking cessation (if current smoker), & weight loss  (if overweight).   - please be advised to avoid known foods which trigger reflux symptoms & to minimize/avoid high-fat foods, chocolate, caffeine, citrus, alcohol, & tomato products.  - Advised to avoid/limit use of NSAID's, since they can cause GI upset, bleeding, and/or ulcers. If needed, take with food.          Relevant Medications    sucralfate (CARAFATE) 1 gram tablet    famotidine (PEPCID) 20 MG tablet     Other Visit Diagnoses       Encounter for prostate cancer screening        Relevant Orders    PSA, Screening          Future Appointments       Date Provider Specialty Appt Notes    5/10/2023  Radiology     11/9/2023  Lab     11/9/2023  Lab     4/3/2024 Jovani Alba MD Cardiology annual           Medication Management for assessment above:   Medication List with Changes/Refills   Current Medications    ASPIRIN (ECOTRIN) 81 MG EC TABLET    Take 1 tablet (81 mg total) by mouth once daily.    ATORVASTATIN (LIPITOR) 40 MG TABLET    Take 1 tablet (40 mg total) by mouth once daily.    GABAPENTIN (NEURONTIN) 300 MG CAPSULE    Take 1 capsule (300 mg total) by mouth 3 (three) times daily.    GLIMEPIRIDE (AMARYL) 4 MG TABLET    Take 2 tablets (8 mg total) by mouth daily with breakfast.    HYDROCHLOROTHIAZIDE (HYDRODIURIL) 25 MG TABLET    Take 1 tablet (25 mg total) by mouth once daily.    LISINOPRIL (PRINIVIL,ZESTRIL) 5 MG TABLET    Take 1 tablet (5 mg total) by mouth once daily.    MECLIZINE (ANTIVERT) 12.5 MG TABLET    TAKE 2 TABLETS BY MOUTH THREE TIMES DAILY AS NEEDED FOR DIZZINESS    METFORMIN (GLUCOPHAGE-XR) 500 MG ER 24HR TABLET    Take 2 tablets (1,000 mg total) by mouth 2 (two) times daily with meals.   Changed and/or Refilled Medications    Modified Medication Previous Medication    FAMOTIDINE (PEPCID) 20 MG TABLET famotidine (PEPCID) 20 MG tablet       Take 1 tablet (20 mg total) by mouth 2 (two) times daily.    Take 1 tablet (20 mg total) by mouth 2 (two) times daily.    SUCRALFATE (CARAFATE) 1  GRAM TABLET sucralfate (CARAFATE) 1 gram tablet       Take 1 tablet (1 g total) by mouth 4 (four) times daily before meals and nightly.    Take 1 tablet (1 g total) by mouth 4 (four) times daily before meals and nightly.       Charles Nash M.D.  ==========================================================================  Subjective:   Patient ID: Luis Lozano is a 63 y.o. male.  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   Chief Complaint: No chief complaint on file.      Problem List Items Addressed This Visit       Encounter for long-term (current) use of medications (Chronic)    Overview     May 2023: Reviewed labs.  December 2022: Reviewed labs.  08/06/2019 : CHRONIC long-term drug therapy for managed conditions. See medication list. Reports compliance.  No side effects reported.  Routine lab work is being monitored.  Patient does  need refills today.   November 2019:  CHRONIC. Stable. Compliant with medications for managed conditions. See medication list. No SE reported. Routine lab analysis is being monitored. Refills were addressed.  SEPTEMBER 2020:  Reviewed labs.  CHRONIC. Stable. Compliant with medications for managed conditions. See medication list. No SE reported. Routine lab analysis is being monitored. Refills were addressed.  DECEMBER 2020:  Reviewed labs.  CHRONIC. Stable. Compliant with medications for managed conditions. See medication list. No SE reported. Routine lab analysis is being monitored. Refills were addressed.  June 2022:  Reviewed labs.  Lab Results   Component Value Date    WBC 8.04 03/30/2023    HGB 15.7 03/30/2023    HCT 46.3 03/30/2023    MCV 86 03/30/2023     03/30/2023         Chemistry        Component Value Date/Time     03/30/2023 2017    K 3.5 03/30/2023 2017    CL 98 03/30/2023 2017    CO2 32 (H) 03/30/2023 2017    BUN 20 03/30/2023 2017    CREATININE 1.07 03/30/2023 2017    GLU 91 03/30/2023 2017         Component Value Date/Time    CALCIUM 9.6 03/30/2023 2017    ALKPHOS 47 03/30/2023 2017    AST 32 03/30/2023 2017    ALT 23 03/30/2023 2017    BILITOT 0.8 03/30/2023 2017    ESTGFRAFRICA >60.0 06/20/2022 1359    EGFRNONAA >60.0 06/20/2022 1359          Lab Results   Component Value Date    TSH 0.796 08/05/2019    C7NFHVQ 7.2 08/05/2019    T3FREE 2.6 08/05/2019       =================================================         Current Assessment & Plan     Complete history and physical was completed today.  Complete and thorough medication reconciliation was performed.  Discussed risks and benefits of medications.  Advised patient on orders and health maintenance.  We discussed old records and old labs if available.  Will request any records not available through epic.  Continue current medications listed on your summary sheet.           Lightheadedness (Chronic)    Overview     Chronic.  May 2023: Patient continues to have dizziness spells.  Patient does have chronic sinus issues and vertigo.  Patient has done well with vestibular therapy in the past.  Previous history:  Intermittent control.  Patient takes meclizine as needed for dizziness which does help.  Patient requesting refill.         Current Assessment & Plan     Check carotid ultrasound.  Referral back to vestibular therapy.  Follow-up with Cardiology also.  Continue meclizine as needed.  Patient will continue to take his sinus medications.         Type 2 diabetes mellitus with diabetic peripheral angiopathy without gangrene, without long-term current use of insulin - Primary (Chronic)    Overview     May 2023:  Patient doing well on current medication regimen.  No side effects reported.  Diabetes Medications               glimepiride (AMARYL) 4 MG tablet Take 2 tablets (8 mg total) by mouth daily with breakfast.    metFORMIN (GLUCOPHAGE-XR) 500 MG ER 24hr tablet Take 2 tablets (1,000 mg total) by mouth 2 (two) times daily with meals.          December  2022: Patient reports he was unable to get the Ozempic.  He is still taking metformin.  Patient denies any history of thyroid cancer, pancreatitis, MEN syndrome.   See other diabetes problem.  Patient does follow with pain management.  He is on gabapentin 300 , three times daily  Diabetes Management Status    Statin: Taking  ACE/ARB: Taking    Screening or Prevention Patient's value Goal Complete/Controlled?   HgA1C Testing and Control   Lab Results   Component Value Date    HGBA1C 8.7 (H) 03/20/2023      Annually/Less than 8% No   Lipid profile : 03/20/2023 Annually Yes   LDL control Lab Results   Component Value Date    LDLCALC 96.2 03/20/2023    Annually/Less than 100 mg/dl  Yes   Nephropathy screening Lab Results   Component Value Date    LABMICR 6.0 06/20/2022     Lab Results   Component Value Date    PROTEINUA Negative 05/14/2021     No results found for: UTPCR   Annually Yes   Blood pressure BP Readings from Last 1 Encounters:   05/09/23 110/70    Less than 140/90 Yes   Dilated retinal exam : 02/16/2023 Annually Yes   Foot exam   : 04/12/2022 Annually No              Current Assessment & Plan     Continue current medications.We will plan to monitor hemoglobin A1c at designated intervals 3 to 6 months.  I recommend ongoing Education for diabetic diet and exercise protocol.  We will continue to monitor for side effects.    Please be advised of symptoms to monitor for and to notify me immediately if persistent or worsening.  Follow up with Ophthalmology/Optometry and Podiatry at least annually.           Gastroesophageal reflux disease without esophagitis    Overview     Chronic.  Stable.  Patient had relief of symptoms with famotidine and Carafate.  Patient reports that his stomach will get upset when eating spicy foods especially peppers.         Current Assessment & Plan     Continue famotidine.  Carafate as needed.  Follow-up with GI if no improvement in symptoms.  GERD RECOMMENDATIONS  Please be advised of  condition course.  - I recommend ongoing Education for lifestyle modifications to help control/reduce reflux/abdominal pain including: avoid large meals, avoid eating within 2-3 hours of bedtime (avoid late night eating & lying down soon after eating), elevate head of bed if nocturnal symptoms are present, smoking cessation (if current smoker), & weight loss (if overweight).   - please be advised to avoid known foods which trigger reflux symptoms & to minimize/avoid high-fat foods, chocolate, caffeine, citrus, alcohol, & tomato products.  - Advised to avoid/limit use of NSAID's, since they can cause GI upset, bleeding, and/or ulcers. If needed, take with food.           Other Visit Diagnoses       Encounter for prostate cancer screening                 Review of patient's allergies indicates:  No Known Allergies  Current Outpatient Medications   Medication Instructions    aspirin (ECOTRIN) 81 mg, Oral, Daily    atorvastatin (LIPITOR) 40 mg, Oral, Daily    famotidine (PEPCID) 20 mg, Oral, 2 times daily    gabapentin (NEURONTIN) 300 mg, Oral, 3 times daily    glimepiride (AMARYL) 8 mg, Oral, With breakfast    hydroCHLOROthiazide (HYDRODIURIL) 25 mg, Oral, Daily    lisinopriL (PRINIVIL,ZESTRIL) 5 mg, Oral, Daily    meclizine (ANTIVERT) 12.5 mg tablet TAKE 2 TABLETS BY MOUTH THREE TIMES DAILY AS NEEDED FOR DIZZINESS    metFORMIN (GLUCOPHAGE-XR) 1,000 mg, Oral, 2 times daily with meals    sucralfate (CARAFATE) 1 g, Oral, Before meals & nightly      I have reviewed the PMH, social history, FamilyHx, surgical history, allergies and medications documented / confirmed by the patient at the time of this visit.  Review of Systems   Constitutional:  Negative for chills, fatigue, fever and unexpected weight change.   HENT:  Negative for ear pain and sore throat.    Eyes:  Negative for redness and visual disturbance.   Respiratory:  Negative for cough and shortness of breath.    Cardiovascular:  Negative for chest pain and  "palpitations.   Gastrointestinal:  Negative for nausea and vomiting.   Endocrine: Negative for cold intolerance and heat intolerance.   Genitourinary:  Negative for difficulty urinating and hematuria.   Musculoskeletal:  Positive for arthralgias. Negative for myalgias.   Skin:  Negative for rash and wound.   Allergic/Immunologic: Negative for environmental allergies and food allergies.   Neurological:  Negative for weakness and headaches.   Hematological:  Negative for adenopathy. Does not bruise/bleed easily.   Psychiatric/Behavioral:  Negative for sleep disturbance. The patient is not nervous/anxious.    Objective:   /70   Pulse 80   Temp 97.1 °F (36.2 °C)   Ht 5' 9" (1.753 m)   Wt 90.6 kg (199 lb 12.8 oz)   SpO2 97%   BMI 29.51 kg/m²   Physical Exam  Vitals and nursing note reviewed.   Constitutional:       General: He is not in acute distress.     Appearance: He is well-developed. He is not diaphoretic.   HENT:      Head: Normocephalic and atraumatic.      Right Ear: Tympanic membrane, ear canal and external ear normal. There is no impacted cerumen.      Left Ear: Tympanic membrane, ear canal and external ear normal. There is no impacted cerumen.      Nose: Nose normal. No rhinorrhea.   Eyes:      Extraocular Movements: Extraocular movements intact.      Pupils: Pupils are equal, round, and reactive to light.   Cardiovascular:      Rate and Rhythm: Normal rate.      Pulses: Normal pulses.   Pulmonary:      Effort: Pulmonary effort is normal. No respiratory distress.      Breath sounds: Normal breath sounds.   Abdominal:      General: Bowel sounds are normal.      Palpations: Abdomen is soft.   Musculoskeletal:         General: Normal range of motion.      Cervical back: Normal range of motion and neck supple.   Skin:     General: Skin is warm and dry.      Capillary Refill: Capillary refill takes less than 2 seconds.      Findings: No rash.   Neurological:      General: No focal deficit present.      " Mental Status: He is alert and oriented to person, place, and time. Mental status is at baseline.      Cranial Nerves: No cranial nerve deficit.      Motor: No weakness.      Gait: Gait normal.   Psychiatric:         Attention and Perception: He is attentive.         Mood and Affect: Mood normal. Mood is not anxious or depressed. Affect is not labile, blunt, angry or inappropriate.         Speech: He is communicative. Speech is not rapid and pressured, delayed, slurred or tangential.         Behavior: Behavior normal. Behavior is not agitated, slowed, aggressive, withdrawn, hyperactive or combative.         Thought Content: Thought content normal. Thought content is not paranoid or delusional. Thought content does not include homicidal or suicidal ideation. Thought content does not include homicidal or suicidal plan.         Cognition and Memory: Memory is not impaired.         Judgment: Judgment normal. Judgment is not impulsive or inappropriate.     + bilateral TM with chronic changes, slight serous fluid noted, no bulging no erythema  Assessment:     1. Type 2 diabetes mellitus with diabetic peripheral angiopathy without gangrene, without long-term current use of insulin    2. Encounter for long-term (current) use of medications    3. Lightheadedness    4. Gastroesophageal reflux disease without esophagitis    5. Encounter for prostate cancer screening      MDM:   Moderate complexity.  Moderate risk.  Total time:32 minutes.  This includes total time spent on the encounter, which includes face to face time and non-face to face time preparing to see the patient (eg, review of previous medical records, tests), Obtaining and/or reviewing separately obtained history, documenting clinical information in the electronic or other health record, independently interpreting results (not separately reported)/communicating results to the patient/family/caregiver, and/or care coordination (not separately reported).    I have  Reviewed and summarized old records.  I have performed thorough medication reconciliation today and discussed risk and benefits of medications.  I have reviewed labs and discussed with patient.  All questions were answered.  I am requesting old records and will review them once they are available.   Cardiology Dr. Alba   Adv Neeru Augustine      I have signed for the following orders AND/OR meds.  Orders Placed This Encounter   Procedures    US Carotid Bilateral     Standing Status:   Future     Standing Expiration Date:   5/9/2024    PSA, Screening     Standing Status:   Future     Standing Expiration Date:   7/7/2024    Ambulatory referral/consult to Physical/Occupational Therapy     Standing Status:   Future     Standing Expiration Date:   6/9/2024     Referral Priority:   Routine     Referral Type:   Physical Medicine     Referral Reason:   Specialty Services Required     Number of Visits Requested:   1     Medications Ordered This Encounter   Medications    famotidine (PEPCID) 20 MG tablet     Sig: Take 1 tablet (20 mg total) by mouth 2 (two) times daily.     Dispense:  60 tablet     Refill:  12    sucralfate (CARAFATE) 1 gram tablet     Sig: Take 1 tablet (1 g total) by mouth 4 (four) times daily before meals and nightly.     Dispense:  90 tablet     Refill:  1        Follow up in about 6 months (around 11/9/2023), or if symptoms worsen or fail to improve, for DM, Med refills, HTN.  Future Appointments       Date Provider Specialty Appt Notes    5/10/2023  Radiology     11/9/2023  Lab     11/9/2023  Lab     4/3/2024 Jovani Alba MD Cardiology annual          If no improvement in symptoms or symptoms worsen, advised to call/follow-up at clinic or go to ER. Patient voiced understanding and all questions/concerns were addressed.   DISCLAIMER: This note was compiled by using a speech recognition dictation system and therefore please be aware that typographical / speech recognition errors can and do  occur.  Please contact me if you see any errors specifically.    Charles Nash M.D.       Office: [phone] [address] [place] [address]  FAX: [phone]

## 2023-05-09 NOTE — ASSESSMENT & PLAN NOTE
Continue famotidine.  Carafate as needed.  Follow-up with GI if no improvement in symptoms.  GERD RECOMMENDATIONS  Please be advised of condition course.  - I recommend ongoing Education for lifestyle modifications to help control/reduce reflux/abdominal pain including: avoid large meals, avoid eating within 2-3 hours of bedtime (avoid late night eating & lying down soon after eating), elevate head of bed if nocturnal symptoms are present, smoking cessation (if current smoker), & weight loss (if overweight).   - please be advised to avoid known foods which trigger reflux symptoms & to minimize/avoid high-fat foods, chocolate, caffeine, citrus, alcohol, & tomato products.  - Advised to avoid/limit use of NSAID's, since they can cause GI upset, bleeding, and/or ulcers. If needed, take with food.

## 2023-05-09 NOTE — ASSESSMENT & PLAN NOTE
Continue current medications.We will plan to monitor hemoglobin A1c at designated intervals 3 to 6 months.  I recommend ongoing Education for diabetic diet and exercise protocol.  We will continue to monitor for side effects.    Please be advised of symptoms to monitor for and to notify me immediately if persistent or worsening.  Follow up with Ophthalmology/Optometry and Podiatry at least annually.

## 2023-05-09 NOTE — PATIENT INSTRUCTIONS
Follow up in about 6 months (around 11/9/2023), or if symptoms worsen or fail to improve, for DM, Med refills, HTN.     Dear patient,   As a result of recent federal legislation (The Federal Cures Act), you may receive lab or pathology results from your visit in your [facility] account before your physician is able to contact you. Your physician or their representative will relay the results to you with their recommendations at their soonest availability.     If no improvement in symptoms or symptoms worsen, please be advised to call MD, follow-up at clinic and/or go to ER if becomes severe.    Charles Nash M.D.        We Offer TELEHEALTH & Same Day Appointments!   Book your Telehealth appointment with me through my nurse or   Clinic appointments on [facility]!    [address] [place] [address]    Office: [phone]   FAX: [phone]    Check out my Facebook Page and Follow Me at: [email or web address]    Check out my website at [employer] by clicking on: [email or web address]    To Schedule appointments [email or web address] go to [facility]: [email or web address]    
WDL

## 2023-05-10 ENCOUNTER — HOSPITAL ENCOUNTER (OUTPATIENT)
Dept: RADIOLOGY | Facility: HOSPITAL | Age: 63
Discharge: HOME | End: 2023-05-10
Attending: FAMILY MEDICINE
Payer: MEDICARE

## 2023-05-10 ENCOUNTER — CLINICAL SUPPORT (OUTPATIENT)
Dept: REHABILITATION | Facility: HOSPITAL | Age: 63
End: 2023-05-10
Attending: FAMILY MEDICINE
Payer: MEDICARE

## 2023-05-10 DIAGNOSIS — R42 LIGHTHEADEDNESS: ICD-10-CM

## 2023-05-10 PROCEDURE — 97110 THERAPEUTIC EXERCISES: CPT | Mod: PN

## 2023-05-10 PROCEDURE — 93880 EXTRACRANIAL BILAT STUDY: CPT | Mod: 26,,, | Performed by: RADIOLOGY

## 2023-05-10 PROCEDURE — 93880 US CAROTID BILATERAL: ICD-10-PCS | Mod: 26,,, | Performed by: RADIOLOGY

## 2023-05-10 PROCEDURE — 93880 EXTRACRANIAL BILAT STUDY: CPT | Mod: TC,PO

## 2023-05-10 PROCEDURE — 97161 PT EVAL LOW COMPLEX 20 MIN: CPT | Mod: PN

## 2023-05-10 NOTE — PLAN OF CARE
[facility]   Physical Therapy Initial Evaluation     Date: 5/10/2023   Name: Luis Lozano  Clinic Number: [id number]    Therapy Diagnosis:   Encounter Diagnosis   Name Primary?    Lightheadedness      Physician: Charles Nash MD    Physician Orders: PT Eval and Treat   Medical Diagnosis from Referral: R42 (ICD-10-CM) - Lightheadedness  Evaluation Date: 5/10/2023  Authorization Period Expiration: 12/30/2023  Plan of Care Expiration: 7/10/2023  Progress Note Due: 6/10/2023  Visit # / Visits authorized: 1/ 1   FOTO: 1/ 3 (eval)     Precautions: Standard and Diabetes    Time In: 2:15 pm  Time Out: 3:00 pm  Total Appointment Time (timed & untimed codes): 45 minutes      SUBJECTIVE   Date of onset: chronic    History of current condition - Luis reports: history of vertigo in 2019, but was not having any issues until yesterday.  He was in yard working on [employer] and noticed he felt off balance and a little light headed.  Patient reports no specific movements and positions cause his dizziness.  He reports no dizziness today and had ultrasound earlier that showed no blockage in carotid arteries.  He described his dizziness as being off balance and light headed, but no spinning or vertigo.    Falls: no    Imaging, US Carotid bilateral: 5/10/23  Impression:   No hemodynamically significant stenosis.    Prior Therapy: no  Social History: one step to enter home lives with their spouse  Occupation: retired, and is currently a   Prior Level of Function: independent  Current Level of Function: independent    Dizziness:  Current 0/10, worst 2/10, best 0/10   Description: light headed and off balance  Aggravating Factors: nothing specific  Easing Factors: rest    Patients goals: avoid dizziness getting worse     Medical History:   Past Medical History:   Diagnosis Date    Diabetes mellitus, type 2     Hypertension     Spermatocele 9/3/2019       Surgical History:   Luis Lozano  has  no past surgical history on file.    Medications:   Luis has a current medication list which includes the following prescription(s): aspirin, atorvastatin, famotidine, gabapentin, glimepiride, hydrochlorothiazide, lisinopril, meclizine, metformin, and sucralfate.    Allergies:   Review of patient's allergies indicates:  No Known Allergies       OBJECTIVE     Posture:   Forward head and rounded shoulder posture     Lower Extremity Strength  Right LE  Left LE    Knee extension: 5/5 Knee extension: 5/5   Knee flexion: 5/5 Knee flexion: 5/5   Hip flexion: 5/5 Hip flexion: 5/5   Hip abduction: 5/5 Hip abduction: 5/5   Ankle dorsiflexion: 5/5 Ankle dorsiflexion: 5/5   Ankle plantarflexion: 5/5 Ankle plantarflexion: 5/5     Sensation: Light Touch: intact                    Occulomotoer Exam  Spontaneous Nystagmus: absent  Gaze-Evoked Nystagmus: absent  Eye movement range: normal    Central Tests:  Smooth Pursuit: horizontal: normal; vertical: normal  Saccades:    Right: normal   Left: normal   Up: normal   Down:  normal  VOR Cancellation: negative    Peripheral Tests:  Head Impulse Test: negative  Dynamic Visual Acuitiy (DVA): not tested  Head Shake Test: negative    BPPV testing:   Posterior canal:  Right Deana-Hallpike: negative  Left Deana-Hallpike: negative    Horizontal canal:   Roll Test Right: negative  Roll Test Left: negative    Balance Testing:     Single leg stance:  SLS (R): 6 seconds  SLS (L): 20 seconds      Sharpened Romberg:   EO: 23 seconds    mCTSIB:  Condition 1 (Normal vision, fixed support): 30 seconds  Condition 2 (absent vision, fixed support): 30 seconds  Condition 4 (normal vision, sway-referenced support): 30 seconds  Condition 5 (absent vision, sway-referenced support): 30 seconds      TUG: <10 seconds    Dynamic Gait Index (DGI):  1. Gait level surface -  3  2. Change in gait speed - 3  3. Gait with horizontal head turns - 3  4. Gait with vertical head turns - 3  5. Gait and pivot turn - 3  6. Step  "over obstacle - 3  7. Step around obstacle - 3  8. Steps - 23  Total 24/24 (<19/24 related to falls in older adults (vestibular included)        Limitation/Restriction for FOTO Vestibular Survey    Therapist reviewed FOTO scores for Luis Lozano on 5/10/2023.   FOTO documents entered into [employer] - see Media section.    Limitation Score: 51         TREATMENT     Total Treatment time (time-based codes) separate from Evaluation: 8 minutes     Luis received the treatments listed below:       therapeutic exercises to develop strength, endurance, ROM, flexibility, posture, and core stabilization for (8)  minutes including:    - instruction, education, and demonstration of HEP  - VOR x 1 (horizontal and vertical) 2 x 30"  - VOR cancellation 2 x 30"     manual therapy techniques applied for (0) minutes, including:      neuromuscular re-education activities to improve: Balance, Coordination, Kinesthetic, Sense, Proprioception, and Posture for (0)  minutes., including:      dynamic functional therapeutic activities to improve functional performance for (0)  minutes, including:      PATIENT EDUCATION AND HOME EXERCISES     Education provided:   - role of PT and goals for therapy  - HEP    Written Home Exercises Provided: yes. Exercises were reviewed and Luis was able to demonstrate them prior to the end of the session.  Luis demonstrated good  understanding of the education provided. See EMR under Patient Instructions for exercises provided during therapy sessions.    ASSESSMENT     Luis is a 63 y.o. male referred to outpatient Physical Therapy with a medical diagnosis of lightheadedness. Patient presents to therapy with no complaint of dizziness on this date.  He has negative vestibular special testing, but has some decrease in standing balance noted.  Patient was issued HEP to perform over next week and scheduled follow up in 2 weeks for re-assessment unless symptoms do not return.      Patient prognosis is Good. "   Patientt will benefit from skilled outpatient Physical Therapy to address the deficits stated above and in the chart below, provide patient /family education, and to maximize patientt's level of independence.     Plan of care discussed with patient: Yes  Patient's spiritual, cultural and educational needs considered and patient is agreeable to the plan of care and goals as stated below:     Anticipated Barriers for therapy: none    Medical Necessity is demonstrated by the following  History  Co-morbidities and personal factors that may impact the plan of care Co-morbidities:   diabetes, high BMI, and HTN    Personal Factors:   no deficits     moderate   Examination  Body Structures and Functions, activity limitations and participation restrictions that may impact the plan of care Body Regions:   head    Body Systems:    balance  gait  transitions    Participation Restrictions:   none    Activity limitations:   Learning and applying knowledge  no deficits    General Tasks and Commands  no deficits    Communication  no deficits    Mobility  no deficits    Self care  no deficits    Domestic Life  no deficits    Interactions/Relationships  no deficits    Life Areas  no deficits    Community and Social Life  recreation and leisure         low   Clinical Presentation stable and uncomplicated low   Decision Making/ Complexity Score: low     Goals:  Short Term Goals: 2 weeks   Patient will be independent with initial HEP.    Long Term Goals: 8 weeks   Patient will be compliant with updated HEP to promote the independent management of current diagnosis.  Patient will improve single leg stance of both LE's to 30 seconds to demonstrate improved standing balance.   Patient will report ability to perform all normal house and yard work without dizziness.    PLAN   Plan of care Certification: 5/10/2023 to 7/10/2023.    Outpatient Physical Therapy 1 times weekly for 8 weeks to include the following interventions: Gait Training,  Manual Therapy, Neuromuscular Re-ed, Patient Education, Therapeutic Activities, Therapeutic Exercise, and canalith repositioning and vestibular therapy .     Marques Villarreal, PT      I CERTIFY THE NEED FOR THESE SERVICES FURNISHED UNDER THIS PLAN OF TREATMENT AND WHILE UNDER MY CARE   Physician's comments:     Physician's Signature: ___________________________________________________

## 2023-05-10 NOTE — PROGRESS NOTES
1st check to see if patient has seen the results.  If not then  CALL patient with results and Document verification.  Schedule follow-up if needed.  [phone]  Ultrasound of the carotid arteries reviewed by radiology.  There is no significant blockage noted.

## 2023-05-31 ENCOUNTER — PATIENT MESSAGE (OUTPATIENT)
Dept: FAMILY MEDICINE | Facility: CLINIC | Age: 63
End: 2023-05-31
Payer: MEDICARE

## 2023-06-28 ENCOUNTER — PATIENT OUTREACH (OUTPATIENT)
Dept: ADMINISTRATIVE | Facility: HOSPITAL | Age: 63
End: 2023-06-28
Payer: MEDICARE

## 2023-06-28 NOTE — PROGRESS NOTES
[facility] Report: patient had CMP on 3/30/23. Scheduled for micro on 11/19/23. Up to date on visit with PCP

## 2023-09-18 ENCOUNTER — PATIENT MESSAGE (OUTPATIENT)
Dept: ADMINISTRATIVE | Facility: HOSPITAL | Age: 63
End: 2023-09-18
Payer: MEDICARE

## 2023-10-06 ENCOUNTER — TELEPHONE (OUTPATIENT)
Dept: CARDIOLOGY | Facility: CLINIC | Age: 63
End: 2023-10-06
Payer: MEDICARE

## 2023-10-06 NOTE — TELEPHONE ENCOUNTER
Attempted without success x2 to contact pt to discuss appt options. Left voicemail for pt to call back.    ----- Message from Farzana Zimmer sent at 10/6/2023 12:13 PM CDT -----  Contact: Luis Smith would like a call back at [phone] in regards to wanting to be worked in for an appointment on 10/11/23 due to patient recent hospital visit and is needing to schedule a follow up.  Thanks   Am

## 2023-10-12 ENCOUNTER — PATIENT MESSAGE (OUTPATIENT)
Dept: CARDIOLOGY | Facility: HOSPITAL | Age: 63
End: 2023-10-12
Payer: MEDICARE

## 2023-10-13 ENCOUNTER — OFFICE VISIT (OUTPATIENT)
Dept: FAMILY MEDICINE | Facility: CLINIC | Age: 63
End: 2023-10-13
Payer: MEDICARE

## 2023-10-13 ENCOUNTER — TELEPHONE (OUTPATIENT)
Dept: FAMILY MEDICINE | Facility: CLINIC | Age: 63
End: 2023-10-13
Payer: MEDICARE

## 2023-10-13 DIAGNOSIS — Z09 HOSPITAL DISCHARGE FOLLOW-UP: ICD-10-CM

## 2023-10-13 DIAGNOSIS — E11.51 TYPE 2 DIABETES MELLITUS WITH DIABETIC PERIPHERAL ANGIOPATHY WITHOUT GANGRENE, WITHOUT LONG-TERM CURRENT USE OF INSULIN: ICD-10-CM

## 2023-10-13 DIAGNOSIS — J06.9 UPPER RESPIRATORY TRACT INFECTION, UNSPECIFIED TYPE: Primary | ICD-10-CM

## 2023-10-13 DIAGNOSIS — Z79.899 ENCOUNTER FOR LONG-TERM (CURRENT) USE OF MEDICATIONS: ICD-10-CM

## 2023-10-13 PROCEDURE — 3052F HG A1C>EQUAL 8.0%<EQUAL 9.0%: CPT | Mod: CPTII,95,, | Performed by: FAMILY MEDICINE

## 2023-10-13 PROCEDURE — 4010F ACE/ARB THERAPY RXD/TAKEN: CPT | Mod: CPTII,95,, | Performed by: FAMILY MEDICINE

## 2023-10-13 PROCEDURE — 4010F PR ACE/ARB THEARPY RXD/TAKEN: ICD-10-PCS | Mod: CPTII,95,, | Performed by: FAMILY MEDICINE

## 2023-10-13 PROCEDURE — 99214 PR OFFICE/OUTPT VISIT, EST, LEVL IV, 30-39 MIN: ICD-10-PCS | Mod: 95,,, | Performed by: FAMILY MEDICINE

## 2023-10-13 PROCEDURE — 99214 OFFICE O/P EST MOD 30 MIN: CPT | Mod: 95,,, | Performed by: FAMILY MEDICINE

## 2023-10-13 PROCEDURE — 1159F MED LIST DOCD IN RCRD: CPT | Mod: CPTII,95,, | Performed by: FAMILY MEDICINE

## 2023-10-13 PROCEDURE — 3052F PR MOST RECENT HEMOGLOBIN A1C LEVEL 8.0 - < 9.0%: ICD-10-PCS | Mod: CPTII,95,, | Performed by: FAMILY MEDICINE

## 2023-10-13 PROCEDURE — 1159F PR MEDICATION LIST DOCUMENTED IN MEDICAL RECORD: ICD-10-PCS | Mod: CPTII,95,, | Performed by: FAMILY MEDICINE

## 2023-10-13 PROCEDURE — 1160F RVW MEDS BY RX/DR IN RCRD: CPT | Mod: CPTII,95,, | Performed by: FAMILY MEDICINE

## 2023-10-13 PROCEDURE — 1160F PR REVIEW ALL MEDS BY PRESCRIBER/CLIN PHARMACIST DOCUMENTED: ICD-10-PCS | Mod: CPTII,95,, | Performed by: FAMILY MEDICINE

## 2023-10-13 RX ORDER — COLCHICINE 0.6 MG/1
0.6 TABLET ORAL
COMMUNITY
Start: 2023-10-06

## 2023-10-13 RX ORDER — ONDANSETRON 4 MG/1
4 TABLET, ORALLY DISINTEGRATING ORAL EVERY 8 HOURS PRN
Qty: 30 TABLET | Refills: 0 | Status: SHIPPED | OUTPATIENT
Start: 2023-10-13 | End: 2023-11-27 | Stop reason: SDUPTHER

## 2023-10-13 RX ORDER — METOPROLOL SUCCINATE 25 MG/1
25 TABLET, EXTENDED RELEASE ORAL
COMMUNITY
Start: 2023-10-06 | End: 2023-10-13

## 2023-10-13 RX ORDER — FLUTICASONE PROPIONATE 50 MCG
1 SPRAY, SUSPENSION (ML) NASAL DAILY
Qty: 16 G | Refills: 0 | Status: SHIPPED | OUTPATIENT
Start: 2023-10-13

## 2023-10-13 RX ORDER — TAMSULOSIN HYDROCHLORIDE 0.4 MG/1
1 CAPSULE ORAL
COMMUNITY
Start: 2023-08-25

## 2023-10-13 RX ORDER — MONTELUKAST SODIUM 10 MG/1
10 TABLET ORAL NIGHTLY
Qty: 30 TABLET | Refills: 0 | Status: SHIPPED | OUTPATIENT
Start: 2023-10-13 | End: 2023-11-12

## 2023-10-13 NOTE — TELEPHONE ENCOUNTER
Rescheduled patient e visit to a virtual visit this morning sent patient direct link and called patient to log on but call was forwarded

## 2023-10-13 NOTE — ASSESSMENT & PLAN NOTE
Patient reports that he took Afrin which started his chest pain.  Patient was put on metoprolol for atrial fibrillation but reports that he spoke with his normal cardiologist who told him that he did not need this medication.  Patient has nuclear stress test that has been ordered.    Patient has been advised to avoid Afrin.  Patient states that he through the medication away.    Transitional Care Note    Family and/or Caretaker present at visit?  Yes.  Diagnostic tests reviewed/disposition: I have reviewed all completed as well as pending diagnostic tests at the time of discharge.  Disease/illness education:  Chest pain, pericarditis  Home health/community services discussion/referrals: Patient does not have home health established from hospital visit.  They do not need home health.  If needed, we will set up home health for the patient.   Establishment or re-establishment of referral orders for community resources: No other necessary community resources.   Discussion with other health care providers: No discussion with other health care providers necessary.

## 2023-10-13 NOTE — ASSESSMENT & PLAN NOTE
Update A1c soon.We will plan to monitor hemoglobin A1c at designated intervals 3 to 6 months.  I recommend ongoing Education for diabetic diet and exercise protocol.  We will continue to monitor for side effects.    Please be advised of symptoms to monitor for and to notify me immediately if persistent or worsening.  Follow up with Ophthalmology/Optometry and Podiatry at least annually.

## 2023-10-13 NOTE — ASSESSMENT & PLAN NOTE
Start Singulair and Flonase.  Discussed condition course and signs and symptoms to expect.  Patient advised take anti-inflammatories and or Tylenol for pain or fever.  ER precautions.  Call MD or follow-up to clinic if not improving or worsening symptoms.

## 2023-10-13 NOTE — PATIENT INSTRUCTIONS
Follow up if symptoms worsen or fail to improve.     Dear patient,   As a result of recent federal legislation (The Federal Cures Act), you may receive lab or pathology results from your visit in your [facility] account before your physician is able to contact you. Your physician or their representative will relay the results to you with their recommendations at their soonest availability.     If no improvement in symptoms or symptoms worsen, please be advised to call MD, follow-up at clinic and/or go to ER if becomes severe.    Charles Nash M.D.        We Offer TELEHEALTH & Same Day Appointments!   Book your Telehealth appointment with me through my nurse or   Clinic appointments on [facility]!    [address] [place] [address]    Office: [phone]   FAX: [phone]    Check out my Facebook Page and Follow Me at: [email or web address]    Check out my website at [employer] by clicking on: [email or web address]    To Schedule appointments online, go to [email or web address]: [email or web address]

## 2023-10-13 NOTE — PROGRESS NOTES
Primary Care Telemedicine Note  The patient location is:  Patient's Home - [place]  The chief complaint leading to consultation is:   Chief Complaint   Patient presents with    URI      Total time:  see MDM below. The total time spent on the encounter, which includes face to face time and non-face to face time preparing to see the patient (eg, review of previous medical records, tests), Obtaining and/or reviewing separately obtained history, documenting clinical information in the electronic or other health record, independently interpreting results (not separately reported)/communicating results to the patient/family/caregiver, and/or care coordination (not separately reported).    Visit type: Virtual visit with synchronous audio and video  Each patient to whom he or she provides medical services by telemedicine is:  (1) informed of the relationship between the physician and patient and the respective role of any other health care provider with respect to management of the patient; and (2) notified that he or she may decline to receive medical services by telemedicine and may withdraw from such care at any time.  =================================================================    PLAN:      Problem List Items Addressed This Visit       Encounter for long-term (current) use of medications (Chronic)     Complete history and limited telemedicine physical was completed today.  Complete and thorough medication reconciliation was performed.  Discussed risks and benefits of medications.  Advised patient on orders and health maintenance.  We discussed old records and old labs if available.  Will request any records not available through epic.  Continue current medications listed on your summary sheet.           Type 2 diabetes mellitus with diabetic peripheral angiopathy without gangrene, without long-term current use of insulin (Chronic)     Update A1c soon.We will plan to monitor hemoglobin A1c at designated intervals  3 to 6 months.  I recommend ongoing Education for diabetic diet and exercise protocol.  We will continue to monitor for side effects.    Please be advised of symptoms to monitor for and to notify me immediately if persistent or worsening.  Follow up with Ophthalmology/Optometry and Podiatry at least annually.           Relevant Medications    ondansetron (ZOFRAN-ODT) 4 MG TbDL    Other Relevant Orders    Hemoglobin A1C    Upper respiratory tract infection - Primary     Start Singulair and Flonase.  Discussed condition course and signs and symptoms to expect.  Patient advised take anti-inflammatories and or Tylenol for pain or fever.  ER precautions.  Call MD or follow-up to clinic if not improving or worsening symptoms.           Relevant Medications    montelukast (SINGULAIR) 10 mg tablet    fluticasone propionate (FLONASE) 50 mcg/actuation nasal spray    Hospital discharge follow-up     Patient reports that he took Afrin which started his chest pain.  Patient was put on metoprolol for atrial fibrillation but reports that he spoke with his normal cardiologist who told him that he did not need this medication.  Patient has nuclear stress test that has been ordered.    Patient has been advised to avoid Afrin.  Patient states that he through the medication away.    Transitional Care Note    Family and/or Caretaker present at visit?  Yes.  Diagnostic tests reviewed/disposition: I have reviewed all completed as well as pending diagnostic tests at the time of discharge.  Disease/illness education:  Chest pain, pericarditis  Home health/community services discussion/referrals: Patient does not have home health established from hospital visit.  They do not need home health.  If needed, we will set up home health for the patient.   Establishment or re-establishment of referral orders for community resources: No other necessary community resources.   Discussion with other health care providers: No discussion with other  health care providers necessary.                   Future Appointments       Date Provider Specialty Appt Notes    11/9/2023  Lab     11/9/2023  Lab     4/9/2024 Jovani Alba MD Cardiology annual           Medication Management for assessment above:   Medication List with Changes/Refills   New Medications    FLUTICASONE PROPIONATE (FLONASE) 50 MCG/ACTUATION NASAL SPRAY    1 spray (50 mcg total) by Each Nostril route once daily.    MONTELUKAST (SINGULAIR) 10 MG TABLET    Take 1 tablet (10 mg total) by mouth every evening.    ONDANSETRON (ZOFRAN-ODT) 4 MG TBDL    Take 1 tablet (4 mg total) by mouth every 8 (eight) hours as needed.   Current Medications    ASPIRIN (ECOTRIN) 81 MG EC TABLET    Take 1 tablet (81 mg total) by mouth once daily.    ATORVASTATIN (LIPITOR) 40 MG TABLET    Take 1 tablet (40 mg total) by mouth once daily.    COLCHICINE, GOUT, (COLCRYS) 0.6 MG TABLET    Take 0.6 mg by mouth.    FAMOTIDINE (PEPCID) 20 MG TABLET    Take 1 tablet (20 mg total) by mouth 2 (two) times daily.    GABAPENTIN (NEURONTIN) 300 MG CAPSULE    Take 1 capsule (300 mg total) by mouth 3 (three) times daily.    GLIMEPIRIDE (AMARYL) 4 MG TABLET    Take 2 tablets (8 mg total) by mouth daily with breakfast.    HYDROCHLOROTHIAZIDE (HYDRODIURIL) 25 MG TABLET    Take 1 tablet (25 mg total) by mouth once daily.    LISINOPRIL (PRINIVIL,ZESTRIL) 5 MG TABLET    Take 1 tablet (5 mg total) by mouth once daily.    MECLIZINE (ANTIVERT) 12.5 MG TABLET    TAKE 2 TABLETS BY MOUTH THREE TIMES DAILY AS NEEDED FOR DIZZINESS    METFORMIN (GLUCOPHAGE-XR) 500 MG ER 24HR TABLET    Take 2 tablets (1,000 mg total) by mouth 2 (two) times daily with meals.    SUCRALFATE (CARAFATE) 1 GRAM TABLET    Take 1 tablet (1 g total) by mouth 4 (four) times daily before meals and nightly.    TAMSULOSIN (FLOMAX) 0.4 MG CAP    Take 1 capsule by mouth.   Discontinued Medications    METOPROLOL SUCCINATE (TOPROL-XL) 25 MG 24 HR TABLET    Take 25 mg by mouth.        Charles Nash M.D.  ==========================================================================  Subjective:   Patient ID: Luis Lozano is a 63 y.o. male.  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   Chief Complaint: URI    Answers submitted by the patient for this visit:  Cough Questionnaire (Submitted on 10/13/2023)  Chief Complaint: Cough  Chronicity: recurrent  Onset: in the past 7 days  Progression since onset: gradually improving  Frequency: hourly  Cough characteristics: non-productive  ear congestion: No  heartburn: Yes  hemoptysis: No  nasal congestion: Yes  sweats: No  weight loss: No  Aggravated by: cold air  asthma: No  bronchiectasis: No  bronchitis: No  COPD: No  emphysema: No  pneumonia: No  Treatments tried: OTC cough suppressant    Problem List Items Addressed This Visit       Encounter for long-term (current) use of medications (Chronic)    Overview     October 2023: Reviewed labs.  May 2023: Reviewed labs.  December 2022: Reviewed labs.  08/06/2019 : CHRONIC long-term drug therapy for managed conditions. See medication list. Reports compliance.  No side effects reported.  Routine lab work is being monitored.  Patient does  need refills today.   November 2019:  CHRONIC. Stable. Compliant with medications for managed conditions. See medication list. No SE reported. Routine lab analysis is being monitored. Refills were addressed.  SEPTEMBER 2020:  Reviewed labs.  CHRONIC. Stable. Compliant with medications for managed conditions. See medication list. No SE reported. Routine lab analysis is being monitored. Refills were addressed.  DECEMBER 2020:  Reviewed labs.  CHRONIC. Stable. Compliant with medications for managed conditions. See medication list. No SE reported. Routine lab analysis is being monitored. Refills were addressed.  June 2022:  Reviewed labs.  Lab Results   Component Value Date    WBC 8.04 03/30/2023    HGB 15.7  03/30/2023    HCT 46.3 03/30/2023    MCV 86 03/30/2023     03/30/2023       Chemistry        Component Value Date/Time     10/05/2023 0300     03/30/2023 2017    K 3.9 10/05/2023 0300    K 3.5 03/30/2023 2017    CL 98 03/30/2023 2017    CO2 29 10/05/2023 0300    CO2 32 (H) 03/30/2023 2017    BUN 17 10/05/2023 0300    BUN 20 03/30/2023 2017    CREATININE 1.12 10/05/2023 0300    CREATININE 1.07 03/30/2023 2017    GLU 91 03/30/2023 2017        Component Value Date/Time    CALCIUM 9.1 10/05/2023 0300    CALCIUM 9.6 03/30/2023 2017    ALKPHOS 43 10/05/2023 0300    ALKPHOS 47 03/30/2023 2017    AST 12 10/05/2023 0300    AST 32 03/30/2023 2017    ALT 13 10/05/2023 0300    ALT 23 03/30/2023 2017    BILITOT 1.3 10/05/2023 0300    BILITOT 0.8 03/30/2023 2017    ESTGFRAFRICA >60.0 06/20/2022 1359    EGFRNONAA >60.0 06/20/2022 1359          Lab Results   Component Value Date    TSH 0.796 08/05/2019    G9ZXOKS 7.2 08/05/2019    T3FREE 2.6 08/05/2019     =================================================         Current Assessment & Plan     Complete history and limited telemedicine physical was completed today.  Complete and thorough medication reconciliation was performed.  Discussed risks and benefits of medications.  Advised patient on orders and health maintenance.  We discussed old records and old labs if available.  Will request any records not available through epic.  Continue current medications listed on your summary sheet.           Type 2 diabetes mellitus with diabetic peripheral angiopathy without gangrene, without long-term current use of insulin (Chronic)    Overview     October 2023:  Patient due for A1c.  Reports no issues with blood sugar.  Diabetes Medications               glimepiride (AMARYL) 4 MG tablet Take 2 tablets (8 mg total) by mouth daily with breakfast.    metFORMIN (GLUCOPHAGE-XR) 500 MG ER 24hr tablet Take 2 tablets (1,000 mg total) by mouth 2 (two) times daily with meals.       "    May 2023:  Patient doing well on current medication regimen.  No side effects reported.  Diabetes Medications               glimepiride (AMARYL) 4 MG tablet Take 2 tablets (8 mg total) by mouth daily with breakfast.    metFORMIN (GLUCOPHAGE-XR) 500 MG ER 24hr tablet Take 2 tablets (1,000 mg total) by mouth 2 (two) times daily with meals.          December 2022: Patient reports he was unable to get the Ozempic.  He is still taking metformin.  Patient denies any history of thyroid cancer, pancreatitis, MEN syndrome.   See other diabetes problem.  Patient does follow with pain management.  He is on gabapentin 300 , three times daily  Diabetes Management Status    Statin: Not taking  ACE/ARB: Taking    Screening or Prevention Patient's value Goal Complete/Controlled?   HgA1C Testing and Control   Lab Results   Component Value Date    HGBA1C 8.7 (H) 03/20/2023      Annually/Less than 8% No   Lipid profile : 10/05/2023 Annually Yes   LDL control Lab Results   Component Value Date    LDLCALC 106 10/05/2023    Annually/Less than 100 mg/dl  No   Nephropathy screening Lab Results   Component Value Date    LABMICR 6.0 06/20/2022     Lab Results   Component Value Date    PROTEINUA Negative 05/14/2021     No results found for: "UTPCR"   Annually No   Blood pressure BP Readings from Last 1 Encounters:   05/09/23 110/70    Less than 140/90 Yes   Dilated retinal exam : 02/16/2023 Annually Yes   Foot exam   : 04/12/2022 Annually No              Current Assessment & Plan     Update A1c soon.We will plan to monitor hemoglobin A1c at designated intervals 3 to 6 months.  I recommend ongoing Education for diabetic diet and exercise protocol.  We will continue to monitor for side effects.    Please be advised of symptoms to monitor for and to notify me immediately if persistent or worsening.  Follow up with Ophthalmology/Optometry and Podiatry at least annually.           Upper respiratory tract infection - Primary    Overview     " Acute on recurrent.  Patient having allergy symptoms.  Patient took Afrin and had heart symptoms from taking this medication.  He went to the emergency room and was evaluated.  Patient also followed up with Cardiology.    He reports taking nasal spray over-the-counter which has improved his symptoms.  Denies any fever chills body aches.         Current Assessment & Plan     Start Singulair and Flonase.  Discussed condition course and signs and symptoms to expect.  Patient advised take anti-inflammatories and or Tylenol for pain or fever.  ER precautions.  Call MD or follow-up to clinic if not improving or worsening symptoms.           Hospital discharge follow-up    Overview     [facility] DISCHARGE SUMMARY    Patient ID:  Luis Lozano  [id number]  63 y.o.  1960    Admit Date:   10/4/2023 9:20 AM    Discharge Date:   Prior Discharge Date: 10/5/2023 4:55 PM    Admitting Physician:   Leo Weinberg MD     Discharge Physician:   LEO WEINBERG MD    Consultants/Treatment Team:  Consultants     Provider Service Role Specialty   Alessandro Rai MD Cardiology Surgeon Cardiology   Alessandro Rai MD Cardiology Consulting Physician Cardiology         Reason for Admission/Admission Diagnoses:   Present on Admission:  New onset atrial fibrillation (HCC)  Type 2 diabetes mellitus, without long-term current use of insulin (HCC)  Primary hypertension  Hyperlipemia  Acute idiopathic pericarditis    Discharge Diagnoses:   Active Hospital Problems   Diagnosis Date Noted   New onset atrial fibrillation (HCC) 10/04/2023   Acute idiopathic pericarditis 10/05/2023   Type 2 diabetes mellitus, without long-term current use of insulin (HCC) 10/04/2023   Primary hypertension 10/04/2023   Hyperlipemia 10/04/2023     Resolved Hospital Problems     History of Present Illness:   63-year-old male with history of diabetes, hyperlipidemia and hypertension presented to the emergency department after transfer from EMS from [facility] for cardiology  evaluation. Patient was reporting some midepigastric discomfort which began last night. He had some radiation upwards and felt it was secondary to heartburn. The patient was checking his blood pressure throughout the day and noted an elevation in his heart rate as high as 150s. He reports he felt some palpitations which were mild in intensity but reported to be fast. He subsequently went to the emergency department with his wife. He did report a little bit of shortness of breath that was associated but denied diaphoresis severe chest pain nausea or vomiting. He also facility was noted to have a heart rate of 147 and be found in atrial flutter. No prior history of A-fib a flutter. He was treated with Lopressor which brought his heart rate down into the 80s but he remained in atrial fibrillation. He was transferred to our facility at [place] with no chest pain and no sensation of palpitations or noted irregular heartbeat. Cardiology was consulted and evaluated the patient who appeared to remain in atrial fibrillation on telemetry. Consideration for possible DAYO/DCCV if he remains in A-fib tomorrow. He is admitted to the medicine service for telemetry monitoring and control of heart rate, symptoms and comorbid conditions.    Hospital Course and Treatment:   Admission Information     Date & Time  10/4/2023 Department  [facility] Telemetry [place] Dept. Phone  [phone]           The patient was placed in observation for pericarditis and new onset a fib. Cardiology was consulted and placed the patient on colchicine. He was placed on telemetry and monitored. He converted back to NSR and while cardiology was considering DAYO and Cardioversion it was ultimately not needed. The patient was monitored overnight. Cardiac enzymes remained benign. Cardiology cleared for discharge to continue colchicine x 3 months. They recommended against anticoagulation at this time to avoid hemopericardium. Plan to follow  outpatient with ischemia evaluation after pericarditis resolves.     I discussed with the patient disease process and treatment.     I have personally seen and examined the patient, Luis Lozano, in a face to face encounter on the date of discharge.     He is cleared for discharge with instructions to follow up as directed.   Total time in the care and discharge planning of this patient was greater than 30 minutes.    Significant Diagnostic Studies:  Recent Imaging and Procedure Results     Procedure Component Value Ref Range Date/Time   Echo Complete [[id number]] Collected: 10/04/2023 1238   Updated: 10/04/2023 1549   PatientHeight 175.26 cm   PatientWeight 95.7096 kg   BSA 2.1 m   2D/MMode measurements and calculations: MMode 2D Measurements & Calculations   Interventricular Septum in Diastole 1.4 cm   Left Ventricle in Diastole 3.6 cm   Left Ventricle in Systole 2.3 cm   LV post wall in Diastole 1.3 cm   IVS/LVPW 1.1   FS 36.8 %   EDV(Teich) 55.5 ml   ESV(Teich) 18 ml   EF(Teich) 67.6 %   EDV(cubed) 47.8 ml   ESV(cubed) 12.1 ml   EF(cubed) 74.8 %   LV mass(C)d 166.9 grams   LV mass(C)dI 79 grams/m   SV(Teich) 37.5 ml   SI(Teich) 17.8 ml/m   SV(cubed) 35.8 ml   SI(cubed) 16.9 ml/m   LA Dimension 2.6 cm   LVOT Diameter 2.2 cm   LVOT area 3.9 cm   LVOT area (traced) 3.8 cm   Time Measurements Time Measurements   Aortic R-R 0.71 sec   Aortic HR 84 BPM   Doppler measurements and calculations: Doppler Measurements & Calculations   MV e max velocity 69.2 cm/sec   MV a velocity 68.1 cm/sec   MV e/a ratio 1   MV Vmax 81.8 cm/sec   MV MEAN PG 2.7 mmHg   MV Vmean 66.2 cm/sec   MV MAX PG 1.8 mmHg   MV V2 VTI 16.8 cm   MVA(VTI) 4.5 cm   MV dec time 0.21 sec   Ao V2 Max 110 cm/sec   Ao max PG 4.8 mmHg   Ao max PG (full) 0.76 mmHg   Ao V2 mean 78.7 cm/sec   Ao mean PG 3 mmHg   Ao mean PG (full) 1 mmHg   Ao V2 VTI 20.7 cm   MAN(I,A) 3.6 cm   MAN(I,D) 3.6 cm   MAN(V,A) 3.5 cm   MAN(V,D) 3.5 cm   LV V1 max PG 4.1 mmHg   LV V1  mean PG 2 mmHg   LV V1 max 101 cm/sec   LV V1 mean 69.2 cm/sec   LV V1 VTI 19.4 cm   CO(LVOT) 6.3 l/min   CI(LVOT) 3 l/min/m   SV(LVOT) 74.9 ml   SI(LVOT) 35.4 ml/m   PI end-d armida 138 cm/sec   TR max armida 253.1 cm/sec   TR max PG 25.8 mmHg   EF MIN = 65 %   EF MAX = 75 %   Narrative:       Adult Echocardiogram  Name: TRENTON WILLIAMSON Study Date: 10/04/2023  MRN: [id number] Age: 63 yrs  Patient Location: [facility]^TRAUMA RM 30^30 Height: 69 in  : 1960 Weight: 211 lb  Gender: Male BSA: 2.1 m2  Reason For Study: st elevation ,suspect pericarditis    Ordering Physician: CHANEL HERNANDEZ  Performed By: Paige Lee [facility]    Interpretation Summary  Left ventricular systolic function is normal.  Ejection Fraction = 65-75%.  There is mild tricuspid regurgitation.  Right ventricular systolic pressure is elevated at 40-50mmHg.  There is mild mitral regurgitation.  The left ventricular wall motion is normal.    Measurements  IVSd: 1.4 cm LVIDd: 3.6 cm  LVPWd: 1.3 cm LVIDs: 2.3 cm  LVOT diam: 2.2 cm LA dimension: 2.6 cm    MMode/2D Measurements & Calculations  FS: 36.8 % LVOT area: 3.9 cm2  EDV(Teich): 55.5 ml  ESV(Teich): 18.0 ml  EF(Teich): 67.6 %  ______________________________________________________________________________  Ao Sinus Diam_phl: 4.0 cm Aortic R-R: 0.71 sec  ______________________________________________________________________________  RA A4Cs_phl: 14.3 cm2 RV Base_phl: 3.4 cm  ______________________________________________________________________________  RV Length_phl: 7.2 cm RV Mid_phl: 3.0 cm  ______________________________________________________________________________  TAPSE_phl: 2.3 cm    Time Measurements  Aortic HR: 84.0 BPM MV dec time: 0.21 sec    Doppler Measurements & Calculations  MV E max armida: 69.2 cm/sec MV V2 max: 81.8 cm/sec  MV A max armida: 68.1 cm/sec MV max P.7 mmHg  MV E/A: 1.0 MV V2 mean: 66.2 cm/sec  MV mean P.8 mmHg  MV V2 VTI: 16.8 cm  MVA(VTI): 4.5 cm2  Ao V2 max: 110.0 cm/sec  LV V1 max P.1 mmHg  Ao max P.8 mmHg LV V1 mean P.0 mmHg  Ao V2 mean: 78.7 cm/sec LV V1 max: 101.0 cm/sec  Ao mean PG: 3.0 mmHg LV V1 mean: 69.2 cm/sec  Ao V2 VTI: 20.7 cm LV V1 VTI: 19.4 cm  MAN(I,D): 3.6 cm2  MAN(V,D): 3.5 cm2  CO(LVOT): 6.3 l/min PI end-d armida: 138.0 cm/sec  CI(LVOT): 3.0 l/min/m2  SV(LVOT): 74.9 ml  TR max armida: 253.1 cm/sec AV VR_phl: 0.92  TR max P.8 mmHg    ______________________________________________________________________________  MAN(VTI)/BSA_phl: 1.7 RV S Vel_phl: 11.2 cm/sec    LEFT VENTRICLE    o The left ventricle is normal in size.  o Ejection Fraction = 65-75%.  o Left ventricular systolic function is normal.  o There is normal left ventricular wall thickness.  o The left ventricular wall motion is normal.    DIASTOLOGY    o LAESV index = '16' ml/m2.  o Lateral e'= '9' cm/s.  o Septal e'= '7' cm/s.  o E/e' ='9'.  o Normal Diastolic function.    RIGHT VENTRICLE    o The right ventricle is normal size.  o The right ventricular systolic function is normal.    ATRIA    o The left atrial size is normal.  o Right atrial size is normal.  o IVC diameter < 2.1cm with > 50% collapse.    MITRAL VALVE    o The mitral valve leaflets appear normal. There is no evidence of  stenosis, fluttering, or prolapse.  o There is mild mitral regurgitation.  o There is no mitral valve stenosis.    TRICUSPID VALVE    o The tricuspid valve is normal.  o There is mild tricuspid regurgitation.  o Right ventricular systolic pressure is elevated at 40-50mmHg.  o There is no tricuspid stenosis.    AORTIC VALVE    o Normal tricuspid aortic valve.  o No aortic regurgitation is present.  o There is no aortic valvular stenosis.    PULMONIC VALVE    o The pulmonic valve leaflets are thin and pliable; valve motion is normal.  o Mild to moderate pulmonic valvular regurgitation.  o There is no pulmonic valvular stenosis.    GREAT VESSELS    o Mild aortic root dilatation.    PERICARDIUM/PLEURAL EFFUSION    o  There is no pericardial effusion.    ______________________________________________________________________________  Electronically signed by: Luna Rai 10/04/2023 03:49 PM  InterpretingPhysician: Luna Rai, electronically signed on 2023-10-04 15:49:02.9         Surgical Procedures during this visit:  **Not Performed**    Procedure(s):  LEFT HEART CATHETERIZATION  Date  10/4/2023  Primary Surgeon  Luna Rai MD  -------------------    Patient's Condition On Discharge:   Stable    Physical Exam  General: Alert and oriented, No acute distress.  HEENT: NCAT, PERRL, EOMI, Normal hearing, MMM  Respiratory: CTAB, No c/r/w, Respirations non-labored, Breath sounds equal.  Cardiovascular: Regular, No murmur/rubs/gallops, No JVD, Cap refill < 2 secs  Gastrointestinal: Soft, NT, ND, +BS  Musculoskeletal: Normal range of motion, No deformity, No atrophy  Extremeties: No cyanosis or edema  Integumentary: Warm, Dry, No rash or lesion  Neurologic: No focal deficits, CN II-XII intact grossly. Strength intact and symmetric and Sensation intact to light touch.  Psychiatric: Cooperative, Appropriate mood & affect, Normal judgment.    Discharge Disposition:   Order for Discharge (From admission, onward)     Start Ordered   10/05/23 1529 Discharge Disposition to: Home or Self Care Once   Expected Discharge Date: 10/05/23     Question: Discharge Disposition to Answer: Home or Self Care   10/05/23 1528   10/05/23 0000 Follow-up with: LUNA RAI; Schedule for: 2; Weeks   Question Answer Comment   with LUNA RAI   Schedule for 2   when Weeks     10/05/23 1528                 Discharge Orders:  Follow-up Information     Oumar George MD. Follow up.   Specialty: Family Medicine  Why: at 8:40 a.m.  Contact information:  [id number] [place] [id number]  [phone]          Luna Rai MD. Go on 10/23/2023.   Specialty: Cardiology  Why: at 10:50 a.m. Please bring photo ID, insurance cards, and list  of current medications to your appointment.  Contact information:  [id number] [place] [id number] [phone]                    Future Appointments:    Immunizations Administered for This Admission     No immunizations given this admission.           Medication List       START taking these medications     colchicine (gout) 0.6 mg Tab tablet  Commonly known as: COLCRYS  Take 1 tablet (0.6 mg total) by mouth daily    metoprolol succinate 25 MG Tb24 24 hr tablet  Commonly known as: TOPROL-XL  Take 1 tablet (25 mg total) by mouth daily with breakfast          CONTINUE taking these medications     aspirin EC 81 MG Tbec EC tablet  Commonly known as: ECOTRIN    atorvastatin 20 MG Tab tablet  Commonly known as: LIPITOR    canagliflozin 300 mg Tab tablet  Commonly known as: INVOKANA    hydroCHLOROthiazide 12.5 mg Cap capsule  Commonly known as: MICROZIDE    metFORMIN 500 MG Tb24 24 hr tablet  Commonly known as: GLUCOPHAGE-XR    multivitamin Tab per tablet  Generic drug: multivitamin          STOP taking these medications     chlorthalidone 25 MG Tab tablet  Commonly known as: HYGROTON    cyclobenzaprine 10 MG Tab tablet  Commonly known as: FLEXERIL    meloxicam 15 MG Tab tablet  Commonly known as: MOBIC    traMADoL 50 mg Tab tablet  Commonly known as: ULTRAM            Where to Get Your Medications       These medications were sent to [id number] - [place], [place] - [address] AT [facility] OF [address] [place] ([place] [address]) [address] [address]     Phone: [phone]   colchicine (gout) 0.6 mg Tab tablet  metoprolol succinate 25 MG Tb24 24 hr tablet      Discharge Orders     Future Labs/Procedures Expected by Expires   Activity as tolerated As directed   Diet (Select type) Cardiac/Low Chol/FELIPE As directed   Questions:   Diet Type: Cardiac/Low Chol/FELIPE   Restriction(s):   Solid Consistency:   Liquid Consistency:   Sodium Restriction:   Fluid restriction:           Electronically signed by Jalen Weinberg MD  at 10/07/2023 10:09 PM CDT           Current Assessment & Plan     Patient reports that he took Afrin which started his chest pain.  Patient was put on metoprolol for atrial fibrillation but reports that he spoke with his normal cardiologist who told him that he did not need this medication.  Patient has nuclear stress test that has been ordered.    Patient has been advised to avoid Afrin.  Patient states that he through the medication away.    Transitional Care Note    Family and/or Caretaker present at visit?  Yes.  Diagnostic tests reviewed/disposition: I have reviewed all completed as well as pending diagnostic tests at the time of discharge.  Disease/illness education:  Chest pain, pericarditis  Home health/community services discussion/referrals: Patient does not have home health established from hospital visit.  They do not need home health.  If needed, we will set up home health for the patient.   Establishment or re-establishment of referral orders for community resources: No other necessary community resources.   Discussion with other health care providers: No discussion with other health care providers necessary.                      Review of patient's allergies indicates:  No Known Allergies  Current Outpatient Medications   Medication Instructions    aspirin (ECOTRIN) 81 mg, Oral, Daily    atorvastatin (LIPITOR) 40 mg, Oral, Daily    colchicine (gout) (COLCRYS) 0.6 mg, Oral    famotidine (PEPCID) 20 mg, Oral, 2 times daily    fluticasone propionate (FLONASE) 50 mcg, Each Nostril, Daily    gabapentin (NEURONTIN) 300 mg, Oral, 3 times daily    glimepiride (AMARYL) 8 mg, Oral, With breakfast    hydroCHLOROthiazide (HYDRODIURIL) 25 mg, Oral, Daily    lisinopriL (PRINIVIL,ZESTRIL) 5 mg, Oral, Daily    meclizine (ANTIVERT) 12.5 mg tablet TAKE 2 TABLETS BY MOUTH THREE TIMES DAILY AS NEEDED FOR DIZZINESS    metFORMIN (GLUCOPHAGE-XR) 1,000 mg, Oral, 2 times daily with meals    montelukast (SINGULAIR) 10 mg,  Oral, Nightly    ondansetron (ZOFRAN-ODT) 4 mg, Oral, Every 8 hours PRN    sucralfate (CARAFATE) 1 g, Oral, Before meals & nightly    tamsulosin (FLOMAX) 0.4 mg Cap 1 capsule, Oral      I have reviewed the PMH, social history, FamilyHx, surgical history, allergies and medications documented / confirmed by the patient at the time of this visit.  Review of Systems   Constitutional:  Negative for chills and fever.   HENT:  Positive for sore throat. Negative for ear pain, postnasal drip and rhinorrhea.    Respiratory:  Positive for cough. Negative for shortness of breath and wheezing.    Cardiovascular:  Negative for chest pain.   Musculoskeletal:  Negative for myalgias.   Skin:  Negative for rash.   Allergic/Immunologic: Negative for environmental allergies.   Neurological:  Positive for headaches.     Objective:   There were no vitals taken for this visit.  Physical Exam  Constitutional:       General: He is not in acute distress.     Appearance: He is well-developed. He is not diaphoretic.   HENT:      Head: Normocephalic and atraumatic.   Eyes:      Extraocular Movements: Extraocular movements intact.      Pupils: Pupils are equal, round, and reactive to light.   Pulmonary:      Effort: Pulmonary effort is normal.   Musculoskeletal:         General: Normal range of motion.      Cervical back: Normal range of motion.   Skin:     Findings: No rash.   Neurological:      Mental Status: He is alert and oriented to person, place, and time.   Psychiatric:         Attention and Perception: He is attentive.         Mood and Affect: Mood normal. Mood is not anxious or depressed. Affect is not labile, blunt, angry or inappropriate.         Speech: He is communicative. Speech is not rapid and pressured, delayed, slurred or tangential.         Behavior: Behavior normal. Behavior is not agitated, slowed, aggressive, withdrawn, hyperactive or combative.         Thought Content: Thought content normal. Thought content is not  paranoid or delusional. Thought content does not include homicidal or suicidal ideation. Thought content does not include homicidal or suicidal plan.         Cognition and Memory: Memory is not impaired.         Judgment: Judgment normal. Judgment is not impulsive or inappropriate.         Assessment:     1. Upper respiratory tract infection, unspecified type    2. Encounter for long-term (current) use of medications    3. Type 2 diabetes mellitus with diabetic peripheral angiopathy without gangrene, without long-term current use of insulin    4. Hospital discharge follow-up      MDM:   Moderate medical complexity.  Moderate risk.   Total time: 31 minutes.  This includes total time spent on the encounter, which includes face to face time and non-face to face time preparing to see the patient (eg, review of previous medical records, tests), Obtaining and/or reviewing separately obtained history, documenting clinical information in the electronic or other health record, independently interpreting results (not separately reported)/communicating results to the patient/family/caregiver, and/or care coordination (not separately reported).    I have Reviewed and summarized old records.  I have performed thorough medication reconciliation today and discussed risk and benefits of medications.  I have reviewed labs and discussed with patient.  All questions were answered.  I am requesting old records and will review them once they are available.  Cardiology, [place]    I have signed for the following orders AND/OR meds.  Orders Placed This Encounter   Procedures    Hemoglobin A1C     Standing Status:   Future     Standing Expiration Date:   2024     Medications Ordered This Encounter   Medications    fluticasone propionate (FLONASE) 50 mcg/actuation nasal spray     Si spray (50 mcg total) by Each Nostril route once daily.     Dispense:  16 g     Refill:  0    montelukast (SINGULAIR) 10 mg tablet     Sig: Take 1  tablet (10 mg total) by mouth every evening.     Dispense:  30 tablet     Refill:  0    ondansetron (ZOFRAN-ODT) 4 MG TbDL     Sig: Take 1 tablet (4 mg total) by mouth every 8 (eight) hours as needed.     Dispense:  30 tablet     Refill:  0        Follow up if symptoms worsen or fail to improve.  Future Appointments       Date Provider Specialty Appt Notes    11/9/2023  Lab     11/9/2023  Lab     4/9/2024 Jovani lAba MD Cardiology annual          If no improvement in symptoms or symptoms worsen, advised to call/follow-up at clinic or go to ER. Patient voiced understanding and all questions/concerns were addressed.   DISCLAIMER: This note was compiled by using a speech recognition dictation system and therefore please be aware that typographical / speech recognition errors can and do occur.  Please contact me if you see any errors specifically.    Charles Nash M.D.       Office: [phone]   [address] [place] [address]  FAX: [phone]

## 2023-10-17 DIAGNOSIS — Z79.899 ENCOUNTER FOR LONG-TERM (CURRENT) USE OF MEDICATIONS: ICD-10-CM

## 2023-10-17 DIAGNOSIS — E11.59 HYPERTENSION ASSOCIATED WITH DIABETES: ICD-10-CM

## 2023-10-17 DIAGNOSIS — E11.9 TYPE 2 DIABETES MELLITUS WITHOUT COMPLICATION, UNSPECIFIED WHETHER LONG TERM INSULIN USE: ICD-10-CM

## 2023-10-17 DIAGNOSIS — I15.2 HYPERTENSION ASSOCIATED WITH DIABETES: ICD-10-CM

## 2023-10-18 RX ORDER — HYDROCHLOROTHIAZIDE 25 MG/1
25 TABLET ORAL
Qty: 90 TABLET | Refills: 1 | Status: SHIPPED | OUTPATIENT
Start: 2023-10-18 | End: 2023-11-28

## 2023-10-18 NOTE — TELEPHONE ENCOUNTER
Care Due:                  Date            Visit Type   Department     Provider  --------------------------------------------------------------------------------                                ESTABLISHED                              PATIENT -    [facility] FAMILY  Last Visit: 10-      "[employer]      MEDICINE       Charles Nash  Next Visit: None Scheduled  None         None Found                                                            Last  Test          Frequency    Reason                     Performed    Due Date  --------------------------------------------------------------------------------    HBA1C.......  6 months...  glimepiride, metFORMIN...  03- 09-    [facility] Embedded Care Due Messages. Reference number: [id number].   10/17/2023 8:05:14 PM CDT

## 2023-10-18 NOTE — TELEPHONE ENCOUNTER
Refill Decision Note   Luis Lozano  is requesting a refill authorization.  Brief Assessment and Rationale for Refill:  Approve     Medication Therapy Plan:  FLOS 11/09/23      Comments:     Note composed:5:13 PM 10/18/2023

## 2023-10-31 LAB
COMMENTS: ABNORMAL
EST. AVERAGE GLUCOSE BLD GHB EST-MCNC: 169 MG/DL
HBA1C MFR BLD: 7.5 % (ref 4.8–5.6)

## 2023-11-02 ENCOUNTER — TELEPHONE (OUTPATIENT)
Dept: CARDIOLOGY | Facility: HOSPITAL | Age: 63
End: 2023-11-02
Payer: MEDICARE

## 2023-11-09 ENCOUNTER — LAB VISIT (OUTPATIENT)
Dept: LAB | Facility: HOSPITAL | Age: 63
End: 2023-11-09
Attending: FAMILY MEDICINE
Payer: MEDICARE

## 2023-11-09 DIAGNOSIS — Z13.220 ENCOUNTER FOR LIPID SCREENING FOR CARDIOVASCULAR DISEASE: ICD-10-CM

## 2023-11-09 DIAGNOSIS — Z00.01 ENCOUNTER FOR GENERAL ADULT MEDICAL EXAMINATION WITH ABNORMAL FINDINGS: ICD-10-CM

## 2023-11-09 DIAGNOSIS — Z13.6 ENCOUNTER FOR LIPID SCREENING FOR CARDIOVASCULAR DISEASE: ICD-10-CM

## 2023-11-09 DIAGNOSIS — Z12.5 ENCOUNTER FOR PROSTATE CANCER SCREENING: ICD-10-CM

## 2023-11-09 DIAGNOSIS — Z79.899 ENCOUNTER FOR LONG-TERM (CURRENT) USE OF MEDICATIONS: ICD-10-CM

## 2023-11-09 LAB
ALBUMIN SERPL BCP-MCNC: 3.8 G/DL (ref 3.5–5.2)
ALP SERPL-CCNC: 42 U/L (ref 55–135)
ALT SERPL W/O P-5'-P-CCNC: 18 U/L (ref 10–44)
ANION GAP SERPL CALC-SCNC: 12 MMOL/L (ref 8–16)
AST SERPL-CCNC: 18 U/L (ref 10–40)
BILIRUB SERPL-MCNC: 0.9 MG/DL (ref 0.1–1)
BUN SERPL-MCNC: 16 MG/DL (ref 8–23)
CALCIUM SERPL-MCNC: 10 MG/DL (ref 8.7–10.5)
CHLORIDE SERPL-SCNC: 99 MMOL/L (ref 95–110)
CHOLEST SERPL-MCNC: 158 MG/DL (ref 120–199)
CHOLEST/HDLC SERPL: 3.4 {RATIO} (ref 2–5)
CO2 SERPL-SCNC: 30 MMOL/L (ref 23–29)
COMPLEXED PSA SERPL-MCNC: 1.2 NG/ML (ref 0–4)
CREAT SERPL-MCNC: 1 MG/DL (ref 0.5–1.4)
EST. GFR  (NO RACE VARIABLE): >60 ML/MIN/1.73 M^2
ESTIMATED AVG GLUCOSE: 148 MG/DL (ref 68–131)
GLUCOSE SERPL-MCNC: 108 MG/DL (ref 70–110)
HBA1C MFR BLD: 6.8 % (ref 4–5.6)
HDLC SERPL-MCNC: 47 MG/DL (ref 40–75)
HDLC SERPL: 29.7 % (ref 20–50)
LDLC SERPL CALC-MCNC: 95.6 MG/DL (ref 63–159)
NONHDLC SERPL-MCNC: 111 MG/DL
POTASSIUM SERPL-SCNC: 3.4 MMOL/L (ref 3.5–5.1)
PROT SERPL-MCNC: 7.4 G/DL (ref 6–8.4)
SODIUM SERPL-SCNC: 141 MMOL/L (ref 136–145)
TRIGL SERPL-MCNC: 77 MG/DL (ref 30–150)

## 2023-11-09 PROCEDURE — 83036 HEMOGLOBIN GLYCOSYLATED A1C: CPT | Performed by: FAMILY MEDICINE

## 2023-11-09 PROCEDURE — 80061 LIPID PANEL: CPT | Performed by: FAMILY MEDICINE

## 2023-11-09 PROCEDURE — 36415 COLL VENOUS BLD VENIPUNCTURE: CPT | Mod: PO | Performed by: FAMILY MEDICINE

## 2023-11-09 PROCEDURE — 80053 COMPREHEN METABOLIC PANEL: CPT | Performed by: FAMILY MEDICINE

## 2023-11-09 PROCEDURE — 84153 ASSAY OF PSA TOTAL: CPT | Performed by: FAMILY MEDICINE

## 2023-11-11 NOTE — PROGRESS NOTES
Make follow-up lab appointment per recommendation below.  Check to see if patient has seen the results through my chart.  If not then,  #CALL THE PATIENT# to discuss results/see if they have questions and document verification of contact. Make F/U appt if needed. [phone]    #My interpretation that was sent to them through [employer]:  Luis, I have reviewed your recent blood work.     PSA screening for prostate cancer is within normal limits.  Repeat annually.  Your metabolic panel which shows your glucose, kidney function, electrolytes, and liver function is stable.  Potassium is slightly low.  Start potassium supplement over-the-counter while taking hydrochlorothiazide.  Your cholesterol is stable.    Your hemoglobin A1c is improved.  Keep up the great work!  This test is gold standard screening test for diabetes.  It is a measures 3 months of your average blood sugar.  =========================  Also please address any outstanding health maintenance that may be due: Low Dose Statin Never done  RSV Vaccine (Age 60+)(1 - 1-dose 60+ series) Never done  Foot Exam due on 04/12/2023  Pneumococcal Vaccines (Age 65+)(3 - PPSV23 or PCV20) due on 05/06/2023  COVID-19 Vaccine(5 - 2023-24 season) due on 09/01/2023

## 2023-11-21 DIAGNOSIS — Z79.899 ENCOUNTER FOR LONG-TERM (CURRENT) USE OF MEDICATIONS: ICD-10-CM

## 2023-11-21 DIAGNOSIS — E11.69 HYPERLIPIDEMIA ASSOCIATED WITH TYPE 2 DIABETES MELLITUS: ICD-10-CM

## 2023-11-21 DIAGNOSIS — E11.59 HYPERTENSION ASSOCIATED WITH DIABETES: ICD-10-CM

## 2023-11-21 DIAGNOSIS — E78.5 HYPERLIPIDEMIA ASSOCIATED WITH TYPE 2 DIABETES MELLITUS: ICD-10-CM

## 2023-11-21 DIAGNOSIS — I15.2 HYPERTENSION ASSOCIATED WITH DIABETES: ICD-10-CM

## 2023-11-21 RX ORDER — HYDROCHLOROTHIAZIDE 25 MG/1
TABLET ORAL
Refills: 0 | OUTPATIENT
Start: 2023-11-21

## 2023-11-21 RX ORDER — ATORVASTATIN CALCIUM 40 MG/1
TABLET, FILM COATED ORAL
Refills: 0 | OUTPATIENT
Start: 2023-11-21

## 2023-11-21 RX ORDER — GLIMEPIRIDE 4 MG/1
TABLET ORAL
Refills: 0 | OUTPATIENT
Start: 2023-11-21

## 2023-11-21 RX ORDER — METFORMIN HYDROCHLORIDE 500 MG/1
TABLET, EXTENDED RELEASE ORAL
Refills: 0 | OUTPATIENT
Start: 2023-11-21

## 2023-11-21 NOTE — TELEPHONE ENCOUNTER
No care due was identified.  [facility] Embedded Care Due Messages. Reference number: [id number].   11/21/2023 9:56:18 AM CST

## 2023-11-21 NOTE — TELEPHONE ENCOUNTER
Refill Decision Note   Luis Lozano  is requesting a refill authorization.  Brief Assessment and Rationale for Refill:  Quick Discontinue     Medication Therapy Plan:   Pharmacy is requesting new scripts for the following medications without required information, (sig/ frequency/qty/etc)         Comments: Pharmacies have been requesting medications for patients without required information, (sig, frequency, qty, etc.). In addition, requests are sent for medication(s) pt. are currently not taking, and medications patients have never taken.    We have spoken to the pharmacies about these request types and advised their teams previously that we are unable to assess these New Script requests and require all details for these requests. This is a known issue and has been reported.      Note composed:10:05 AM 11/21/2023

## 2023-11-22 RX ORDER — LISINOPRIL 5 MG/1
5 TABLET ORAL DAILY
Qty: 30 TABLET | Refills: 11 | Status: SHIPPED | OUTPATIENT
Start: 2023-11-22 | End: 2023-11-27 | Stop reason: SDUPTHER

## 2023-11-27 DIAGNOSIS — E11.59 HYPERTENSION ASSOCIATED WITH DIABETES: ICD-10-CM

## 2023-11-27 DIAGNOSIS — E78.5 HYPERLIPIDEMIA ASSOCIATED WITH TYPE 2 DIABETES MELLITUS: ICD-10-CM

## 2023-11-27 DIAGNOSIS — E11.69 HYPERLIPIDEMIA ASSOCIATED WITH TYPE 2 DIABETES MELLITUS: ICD-10-CM

## 2023-11-27 DIAGNOSIS — M54.16 RADICULOPATHY, LUMBAR REGION: ICD-10-CM

## 2023-11-27 DIAGNOSIS — E11.51 TYPE 2 DIABETES MELLITUS WITH DIABETIC PERIPHERAL ANGIOPATHY WITHOUT GANGRENE, WITHOUT LONG-TERM CURRENT USE OF INSULIN: ICD-10-CM

## 2023-11-27 DIAGNOSIS — Z79.899 ENCOUNTER FOR LONG-TERM (CURRENT) USE OF MEDICATIONS: ICD-10-CM

## 2023-11-27 DIAGNOSIS — I15.2 HYPERTENSION ASSOCIATED WITH DIABETES: ICD-10-CM

## 2023-11-27 DIAGNOSIS — K21.9 GASTROESOPHAGEAL REFLUX DISEASE WITHOUT ESOPHAGITIS: ICD-10-CM

## 2023-11-27 DIAGNOSIS — R42 DIZZINESS: ICD-10-CM

## 2023-11-27 RX ORDER — GLIMEPIRIDE 4 MG/1
8 TABLET ORAL
Qty: 180 TABLET | Refills: 4 | Status: CANCELLED | OUTPATIENT
Start: 2023-11-27 | End: 2024-11-26

## 2023-11-27 RX ORDER — GLIMEPIRIDE 4 MG/1
8 TABLET ORAL
Qty: 180 TABLET | Refills: 0 | OUTPATIENT
Start: 2023-11-27

## 2023-11-27 NOTE — TELEPHONE ENCOUNTER
No care due was identified.  [facility] Embedded Care Due Messages. Reference number: [id number].   11/27/2023 12:02:21 PM CST

## 2023-11-27 NOTE — TELEPHONE ENCOUNTER
No care due was identified.  [facility] Embedded Care Due Messages. Reference number: [id number].   11/27/2023 1:01:43 PM CST

## 2023-11-28 RX ORDER — MECLIZINE HCL 12.5 MG 12.5 MG/1
TABLET ORAL
Qty: 30 TABLET | Refills: 12 | Status: SHIPPED | OUTPATIENT
Start: 2023-11-28

## 2023-11-28 RX ORDER — GLIMEPIRIDE 4 MG/1
8 TABLET ORAL
Qty: 180 TABLET | Refills: 1 | Status: SHIPPED | OUTPATIENT
Start: 2023-11-28 | End: 2023-11-28 | Stop reason: CLARIF

## 2023-11-28 RX ORDER — HYDROCHLOROTHIAZIDE 25 MG/1
25 TABLET ORAL
Qty: 90 TABLET | Refills: 1 | OUTPATIENT
Start: 2023-11-28

## 2023-11-28 RX ORDER — METFORMIN HYDROCHLORIDE 500 MG/1
1000 TABLET, EXTENDED RELEASE ORAL 2 TIMES DAILY WITH MEALS
Qty: 360 TABLET | Refills: 3 | OUTPATIENT
Start: 2023-11-28 | End: 2024-11-27

## 2023-11-28 RX ORDER — GLIMEPIRIDE 4 MG/1
8 TABLET ORAL
Qty: 180 TABLET | Refills: 1 | Status: SHIPPED | OUTPATIENT
Start: 2023-11-28 | End: 2023-12-07 | Stop reason: SDUPTHER

## 2023-11-28 RX ORDER — GABAPENTIN 300 MG/1
300 CAPSULE ORAL 3 TIMES DAILY
Qty: 270 CAPSULE | Refills: 3 | Status: SHIPPED | OUTPATIENT
Start: 2023-11-28

## 2023-11-28 RX ORDER — METFORMIN HYDROCHLORIDE 500 MG/1
1000 TABLET, EXTENDED RELEASE ORAL 2 TIMES DAILY WITH MEALS
Qty: 360 TABLET | Refills: 1 | Status: SHIPPED | OUTPATIENT
Start: 2023-11-28

## 2023-11-28 RX ORDER — FAMOTIDINE 20 MG/1
20 TABLET, FILM COATED ORAL 2 TIMES DAILY
Qty: 180 TABLET | Refills: 3 | Status: SHIPPED | OUTPATIENT
Start: 2023-11-28

## 2023-11-28 RX ORDER — HYDROCHLOROTHIAZIDE 25 MG/1
25 TABLET ORAL
Qty: 90 TABLET | Refills: 0 | Status: SHIPPED | OUTPATIENT
Start: 2023-11-28

## 2023-11-28 RX ORDER — ONDANSETRON 4 MG/1
4 TABLET, ORALLY DISINTEGRATING ORAL EVERY 8 HOURS PRN
Qty: 30 TABLET | Refills: 0 | Status: SHIPPED | OUTPATIENT
Start: 2023-11-28 | End: 2024-02-22

## 2023-11-28 RX ORDER — SUCRALFATE 1 G/1
1 TABLET ORAL
Qty: 90 TABLET | Refills: 1 | Status: SHIPPED | OUTPATIENT
Start: 2023-11-28

## 2023-11-28 RX ORDER — ATORVASTATIN CALCIUM 40 MG/1
40 TABLET, FILM COATED ORAL DAILY
Qty: 90 TABLET | Refills: 3 | OUTPATIENT
Start: 2023-11-28 | End: 2024-11-27

## 2023-11-28 RX ORDER — ATORVASTATIN CALCIUM 40 MG/1
40 TABLET, FILM COATED ORAL
Qty: 90 TABLET | Refills: 3 | Status: SHIPPED | OUTPATIENT
Start: 2023-11-28

## 2023-11-28 NOTE — TELEPHONE ENCOUNTER
Refill Routing Note   Medication(s) are not appropriate for processing by [facility] for the following reason(s):        Required labs abnormal    [facility] action(s):  Defer  Approve             Appointments  past 12m or future 3m with PCP    Date Provider   Last Visit   10/13/2023 Charles Nash MD   Next Visit   11/27/2023 Charles Nash MD   ED visits in past 90 days: 0        Note composed:4:26 AM 11/28/2023

## 2023-12-01 DIAGNOSIS — Z79.899 ENCOUNTER FOR LONG-TERM (CURRENT) USE OF MEDICATIONS: ICD-10-CM

## 2023-12-04 RX ORDER — LISINOPRIL 5 MG/1
5 TABLET ORAL DAILY
Qty: 30 TABLET | Refills: 11 | Status: SHIPPED | OUTPATIENT
Start: 2023-12-04

## 2023-12-07 ENCOUNTER — OFFICE VISIT (OUTPATIENT)
Dept: FAMILY MEDICINE | Facility: CLINIC | Age: 63
End: 2023-12-07
Payer: MEDICARE

## 2023-12-07 VITALS
OXYGEN SATURATION: 99 % | BODY MASS INDEX: 29.01 KG/M2 | WEIGHT: 195.88 LBS | DIASTOLIC BLOOD PRESSURE: 74 MMHG | SYSTOLIC BLOOD PRESSURE: 114 MMHG | HEIGHT: 69 IN | HEART RATE: 75 BPM

## 2023-12-07 DIAGNOSIS — J34.9 SINUS PROBLEM: ICD-10-CM

## 2023-12-07 DIAGNOSIS — Z79.899 ENCOUNTER FOR LONG-TERM (CURRENT) USE OF MEDICATIONS: ICD-10-CM

## 2023-12-07 DIAGNOSIS — E87.6 HYPOKALEMIA: ICD-10-CM

## 2023-12-07 DIAGNOSIS — E11.51 TYPE 2 DIABETES MELLITUS WITH DIABETIC PERIPHERAL ANGIOPATHY WITHOUT GANGRENE, WITHOUT LONG-TERM CURRENT USE OF INSULIN: Primary | ICD-10-CM

## 2023-12-07 PROCEDURE — 3044F HG A1C LEVEL LT 7.0%: CPT | Mod: CPTII,S$GLB,, | Performed by: FAMILY MEDICINE

## 2023-12-07 PROCEDURE — 1159F MED LIST DOCD IN RCRD: CPT | Mod: CPTII,S$GLB,, | Performed by: FAMILY MEDICINE

## 2023-12-07 PROCEDURE — 99999 PR PBB SHADOW E&M-EST. PATIENT-LVL V: CPT | Mod: PBBFAC,,, | Performed by: FAMILY MEDICINE

## 2023-12-07 PROCEDURE — 4010F ACE/ARB THERAPY RXD/TAKEN: CPT | Mod: CPTII,S$GLB,, | Performed by: FAMILY MEDICINE

## 2023-12-07 PROCEDURE — 3066F NEPHROPATHY DOC TX: CPT | Mod: CPTII,S$GLB,, | Performed by: FAMILY MEDICINE

## 2023-12-07 PROCEDURE — 1159F PR MEDICATION LIST DOCUMENTED IN MEDICAL RECORD: ICD-10-PCS | Mod: CPTII,S$GLB,, | Performed by: FAMILY MEDICINE

## 2023-12-07 PROCEDURE — 3066F PR DOCUMENTATION OF TREATMENT FOR NEPHROPATHY: ICD-10-PCS | Mod: CPTII,S$GLB,, | Performed by: FAMILY MEDICINE

## 2023-12-07 PROCEDURE — 3061F NEG MICROALBUMINURIA REV: CPT | Mod: CPTII,S$GLB,, | Performed by: FAMILY MEDICINE

## 2023-12-07 PROCEDURE — 3074F SYST BP LT 130 MM HG: CPT | Mod: CPTII,S$GLB,, | Performed by: FAMILY MEDICINE

## 2023-12-07 PROCEDURE — 99215 PR OFFICE/OUTPT VISIT, EST, LEVL V, 40-54 MIN: ICD-10-PCS | Mod: S$GLB,,, | Performed by: FAMILY MEDICINE

## 2023-12-07 PROCEDURE — 3044F PR MOST RECENT HEMOGLOBIN A1C LEVEL <7.0%: ICD-10-PCS | Mod: CPTII,S$GLB,, | Performed by: FAMILY MEDICINE

## 2023-12-07 PROCEDURE — 1160F PR REVIEW ALL MEDS BY PRESCRIBER/CLIN PHARMACIST DOCUMENTED: ICD-10-PCS | Mod: CPTII,S$GLB,, | Performed by: FAMILY MEDICINE

## 2023-12-07 PROCEDURE — 3078F PR MOST RECENT DIASTOLIC BLOOD PRESSURE < 80 MM HG: ICD-10-PCS | Mod: CPTII,S$GLB,, | Performed by: FAMILY MEDICINE

## 2023-12-07 PROCEDURE — 99215 OFFICE O/P EST HI 40 MIN: CPT | Mod: S$GLB,,, | Performed by: FAMILY MEDICINE

## 2023-12-07 PROCEDURE — 3008F BODY MASS INDEX DOCD: CPT | Mod: CPTII,S$GLB,, | Performed by: FAMILY MEDICINE

## 2023-12-07 PROCEDURE — 99999 PR PBB SHADOW E&M-EST. PATIENT-LVL V: ICD-10-PCS | Mod: PBBFAC,,, | Performed by: FAMILY MEDICINE

## 2023-12-07 PROCEDURE — 3008F PR BODY MASS INDEX (BMI) DOCUMENTED: ICD-10-PCS | Mod: CPTII,S$GLB,, | Performed by: FAMILY MEDICINE

## 2023-12-07 PROCEDURE — 3078F DIAST BP <80 MM HG: CPT | Mod: CPTII,S$GLB,, | Performed by: FAMILY MEDICINE

## 2023-12-07 PROCEDURE — 1160F RVW MEDS BY RX/DR IN RCRD: CPT | Mod: CPTII,S$GLB,, | Performed by: FAMILY MEDICINE

## 2023-12-07 PROCEDURE — 3074F PR MOST RECENT SYSTOLIC BLOOD PRESSURE < 130 MM HG: ICD-10-PCS | Mod: CPTII,S$GLB,, | Performed by: FAMILY MEDICINE

## 2023-12-07 PROCEDURE — 3061F PR NEG MICROALBUMINURIA RESULT DOCUMENTED/REVIEW: ICD-10-PCS | Mod: CPTII,S$GLB,, | Performed by: FAMILY MEDICINE

## 2023-12-07 PROCEDURE — 4010F PR ACE/ARB THEARPY RXD/TAKEN: ICD-10-PCS | Mod: CPTII,S$GLB,, | Performed by: FAMILY MEDICINE

## 2023-12-07 RX ORDER — LANCETS 28 GAUGE
EACH MISCELLANEOUS
COMMUNITY
Start: 2023-11-02

## 2023-12-07 RX ORDER — BLOOD-GLUCOSE METER
KIT MISCELLANEOUS
COMMUNITY
Start: 2023-11-02

## 2023-12-07 RX ORDER — METOPROLOL SUCCINATE 25 MG/1
TABLET, EXTENDED RELEASE ORAL
COMMUNITY

## 2023-12-07 RX ORDER — MONTELUKAST SODIUM 10 MG/1
10 TABLET ORAL NIGHTLY
Qty: 30 TABLET | Refills: 0 | Status: SHIPPED | OUTPATIENT
Start: 2023-12-07 | End: 2024-01-06

## 2023-12-07 RX ORDER — GLIMEPIRIDE 4 MG/1
2 TABLET ORAL
Qty: 90 TABLET | Refills: 1 | Status: SHIPPED | OUTPATIENT
Start: 2023-12-07

## 2023-12-07 RX ORDER — AZELASTINE 1 MG/ML
SPRAY, METERED NASAL
COMMUNITY

## 2023-12-07 RX ORDER — ALBUTEROL SULFATE 90 UG/1
2 AEROSOL, METERED RESPIRATORY (INHALATION) EVERY 4 HOURS PRN
COMMUNITY
Start: 2023-11-22 | End: 2023-12-22

## 2023-12-07 NOTE — PATIENT INSTRUCTIONS
Clemente Smith,     If you are due for any health screening(s) below please notify me so we can arrange them to be ordered and scheduled. Most healthy patients at your age complete them, but you are free to accept or refuse.     If you can't do it, I'll definitely understand. If you can, I'd certainly appreciate it!    All of your core healthy metrics are met.            Follow up in about 6 months (around 6/7/2024), or if symptoms worsen or fail to improve, for Med refills.     Dear patient,   As a result of recent federal legislation (The Federal Cures Act), you may receive lab or pathology results from your visit in your [facility] account before your physician is able to contact you. Your physician or their representative will relay the results to you with their recommendations at their soonest availability.     If no improvement in symptoms or symptoms worsen, please be advised to call MD, follow-up at clinic and/or go to ER if becomes severe.    Charles Nash M.D.        We Offer TELEHEALTH & Same Day Appointments!   Book your Telehealth appointment with me through my nurse or   Clinic appointments on [employer]!    [address] [place] [address]    Office: [phone]   FAX: [phone]    Check out my Facebook Page and Follow Me at: [email or web address]    Check out my website at [email or web address] by clicking on: [email or web address]    [email or web address] Schedule appointments online, go to [employer]: [email or web address]

## 2023-12-07 NOTE — PROGRESS NOTES
PLAN:      Problem List Items Addressed This Visit       Sinus problem (Chronic)     Start Singulair to current regimen consider ENT referral no improvement.  Avoid triggers.  Discussed condition course and signs and symptoms to expect.  Patient advised take anti-inflammatories and or Tylenol for pain or fever.  ER precautions.  Call MD or follow-up to clinic if not improving or worsening symptoms.             Relevant Medications    montelukast (SINGULAIR) 10 mg tablet    Encounter for long-term (current) use of medications (Chronic)     Complete history and physical was completed today.  Complete and thorough medication reconciliation was performed.  Discussed risks and benefits of medications.  Advised patient on orders and health maintenance.  We discussed old records and old labs if available.  Will request any records not available through epic.  Continue current medications listed on your summary sheet.           Relevant Medications    glimepiride (AMARYL) 4 MG tablet    Type 2 diabetes mellitus with diabetic peripheral angiopathy without gangrene, without long-term current use of insulin - Primary (Chronic)     Decrease glimepiride.  Patient is still declining GLP 1 medication at this time.  Wife is doing well on the medication.  Update A1c soon.We will plan to monitor hemoglobin A1c at designated intervals 3 to 6 months.  I recommend ongoing Education for diabetic diet and exercise protocol.  We will continue to monitor for side effects.    Please be advised of symptoms to monitor for and to notify me immediately if persistent or worsening.  Follow up with Ophthalmology/Optometry and Podiatry at least annually.           Relevant Medications    glimepiride (AMARYL) 4 MG tablet    Other Relevant Orders    Ambulatory referral/consult to Podiatry    Hypokalemia     Start potassium supplement.  Monitor potassium.         Relevant Medications    potassium chloride (MICRO-K) 10 MEQ CpSR    Other Relevant Orders     Potassium     Future Appointments       Date Provider Specialty Appt Notes    3/19/2024 Garry Sexton DPM Podiatry .    4/9/2024 Jovani Alba MD Cardiology annual    6/7/2024  Lab .           Medication Management for assessment above:   Medication List with Changes/Refills   New Medications    MONTELUKAST (SINGULAIR) 10 MG TABLET    Take 1 tablet (10 mg total) by mouth every evening.    POTASSIUM CHLORIDE (MICRO-K) 10 MEQ CPSR    Take 1 capsule (10 mEq total) by mouth once daily.   Current Medications    ALBUTEROL (PROVENTIL/VENTOLIN HFA) 90 MCG/ACTUATION INHALER    Inhale 2 puffs into the lungs every 4 (four) hours as needed.    ASPIRIN (ECOTRIN) 81 MG EC TABLET    Take 1 tablet (81 mg total) by mouth once daily.    ATORVASTATIN (LIPITOR) 40 MG TABLET    Take 1 tablet by mouth once daily    AZELASTINE (ASTELIN) 137 MCG (0.1 %) NASAL SPRAY    30    COLCHICINE, GOUT, (COLCRYS) 0.6 MG TABLET    Take 0.6 mg by mouth.    FAMOTIDINE (PEPCID) 20 MG TABLET    Take 1 tablet (20 mg total) by mouth 2 (two) times daily.    FLUTICASONE PROPIONATE (FLONASE) 50 MCG/ACTUATION NASAL SPRAY    1 spray (50 mcg total) by Each Nostril route once daily.    FREESTYLE FREEDOM LITE KIT        FREESTYLE LANCETS 28 GAUGE LANCETS        FREESTYLE LITE STRIPS STRP        GABAPENTIN (NEURONTIN) 300 MG CAPSULE    Take 1 capsule (300 mg total) by mouth 3 (three) times daily.    HYDROCHLOROTHIAZIDE (HYDRODIURIL) 25 MG TABLET    Take 1 tablet by mouth once daily    LISINOPRIL (PRINIVIL,ZESTRIL) 5 MG TABLET    Take 1 tablet (5 mg total) by mouth once daily.    MECLIZINE (ANTIVERT) 12.5 MG TABLET    TAKE 2 TABLETS BY MOUTH THREE TIMES DAILY AS NEEDED FOR DIZZINESS    METFORMIN (GLUCOPHAGE-XR) 500 MG ER 24HR TABLET    TAKE 2 TABLETS BY MOUTH TWICE DAILY WITH MEALS    METOPROLOL SUCCINATE (TOPROL-XL) 25 MG 24 HR TABLET    30    ONDANSETRON (ZOFRAN-ODT) 4 MG TBDL    Take 1 tablet (4 mg total) by mouth every 8 (eight) hours as needed.     SUCRALFATE (CARAFATE) 1 GRAM TABLET    Take 1 tablet (1 g total) by mouth 4 (four) times daily before meals and nightly.    TAMSULOSIN (FLOMAX) 0.4 MG CAP    Take 1 capsule by mouth.   Changed and/or Refilled Medications    Modified Medication Previous Medication    GLIMEPIRIDE (AMARYL) 4 MG TABLET glimepiride (AMARYL) 4 MG tablet       Take 0.5 tablets (2 mg total) by mouth daily with breakfast.    Take 2 tablets (8 mg total) by mouth daily with breakfast.       Charles Nash M.D.  ==========================================================================  Subjective:   Patient ID: Luis Lozano is a 63 y.o. male.  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   Chief Complaint: Sinus Problem (Annual/)      Problem List Items Addressed This Visit       Sinus problem (Chronic)    Overview     Chronic.  Intermittent control.  Patient has been on Flonase intermittently.  He has had sinus issues for year.  Also takes Astelin.         Current Assessment & Plan     Start Singulair to current regimen consider ENT referral no improvement.  Avoid triggers.  Discussed condition course and signs and symptoms to expect.  Patient advised take anti-inflammatories and or Tylenol for pain or fever.  ER precautions.  Call MD or follow-up to clinic if not improving or worsening symptoms.             Encounter for long-term (current) use of medications (Chronic)    Overview     December 2023:  Reviewed labs.  October 2023: Reviewed labs.  May 2023: Reviewed labs.  December 2022: Reviewed labs.  08/06/2019 : CHRONIC long-term drug therapy for managed conditions. See medication list. Reports compliance.  No side effects reported.  Routine lab work is being monitored.  Patient does  need refills today.   November 2019:  CHRONIC. Stable. Compliant with medications for managed conditions. See medication list. No SE reported. Routine lab analysis is being monitored. Refills were  addressed.  SEPTEMBER 2020:  Reviewed labs.  CHRONIC. Stable. Compliant with medications for managed conditions. See medication list. No SE reported. Routine lab analysis is being monitored. Refills were addressed.  DECEMBER 2020:  Reviewed labs.  CHRONIC. Stable. Compliant with medications for managed conditions. See medication list. No SE reported. Routine lab analysis is being monitored. Refills were addressed.  June 2022:  Reviewed labs.  Lab Results   Component Value Date    WBC 8.04 03/30/2023    HGB 15.7 03/30/2023    HCT 46.3 03/30/2023    MCV 86 03/30/2023     03/30/2023       Chemistry        Component Value Date/Time     11/09/2023 0712    K 3.4 (L) 11/09/2023 0712    CL 99 11/09/2023 0712    CO2 30 (H) 11/09/2023 0712    BUN 16 11/09/2023 0712    CREATININE 1.0 11/09/2023 0712     11/09/2023 0712        Component Value Date/Time    CALCIUM 10.0 11/09/2023 0712    ALKPHOS 42 (L) 11/09/2023 0712    AST 18 11/09/2023 0712    ALT 18 11/09/2023 0712    BILITOT 0.9 11/09/2023 0712    ESTGFRAFRICA >60.0 06/20/2022 1359    EGFRNONAA >60.0 06/20/2022 1359        Lab Results   Component Value Date    TSH 0.796 08/05/2019    W5QBVYL 7.2 08/05/2019    T3FREE 2.6 08/05/2019     =================================================         Current Assessment & Plan     Complete history and physical was completed today.  Complete and thorough medication reconciliation was performed.  Discussed risks and benefits of medications.  Advised patient on orders and health maintenance.  We discussed old records and old labs if available.  Will request any records not available through epic.  Continue current medications listed on your summary sheet.           Type 2 diabetes mellitus with diabetic peripheral angiopathy without gangrene, without long-term current use of insulin - Primary (Chronic)    Overview     December 2023:  Patient reports having some hypoglycemic episodes on glimepiride at higher dose.  He  "is currently stable on half tablet.  Diabetes Medications               glimepiride (AMARYL) 4 MG tablet Take 0.5 tablets (2 mg total) by mouth daily with breakfast.    metFORMIN (GLUCOPHAGE-XR) 500 MG ER 24hr tablet TAKE 2 TABLETS BY MOUTH TWICE DAILY WITH MEALS         October 2023:  Patient due for A1c.  Reports no issues with blood sugar.May 2023:  Patient doing well on current medication regimen.  No side effects reported.  December 2022: Patient reports he was unable to get the Ozempic.  He is still taking metformin.  Patient denies any history of thyroid cancer, pancreatitis, MEN syndrome.   See other diabetes problem.  Patient does follow with pain management.  He is on gabapentin 300 , three times daily  Diabetes Management Status    Statin: Taking  ACE/ARB: Taking    Screening or Prevention Patient's value Goal Complete/Controlled?   HgA1C Testing and Control   Lab Results   Component Value Date    HGBA1C 6.8 (H) 11/09/2023      Annually/Less than 8% Yes   Lipid profile : 11/09/2023 Annually Yes   LDL control Lab Results   Component Value Date    LDLCALC 95.6 11/09/2023    Annually/Less than 100 mg/dl  Yes   Nephropathy screening Lab Results   Component Value Date    LABMICR 9.0 11/09/2023     Lab Results   Component Value Date    PROTEINUA Negative 05/14/2021     No results found for: "UTPCR"   Annually Yes   Blood pressure BP Readings from Last 1 Encounters:   12/07/23 114/74    Less than 140/90 Yes   Dilated retinal exam : 02/16/2023 Annually Yes   Foot exam   : 04/12/2022 Annually No            Current Assessment & Plan     Decrease glimepiride.  Patient is still declining GLP 1 medication at this time.  Wife is doing well on the medication.  Update A1c soon.We will plan to monitor hemoglobin A1c at designated intervals 3 to 6 months.  I recommend ongoing Education for diabetic diet and exercise protocol.  We will continue to monitor for side effects.    Please be advised of symptoms to monitor for and " to notify me immediately if persistent or worsening.  Follow up with Ophthalmology/Optometry and Podiatry at least annually.           Hypokalemia    Overview     Lab Results   Component Value Date    K 3.4 (L) 11/09/2023            Current Assessment & Plan     Start potassium supplement.  Monitor potassium.             Review of patient's allergies indicates:   Allergen Reactions    Oxymetazoline Palpitations     Current Outpatient Medications   Medication Instructions    albuterol (PROVENTIL/VENTOLIN HFA) 90 mcg/actuation inhaler 2 puffs, Inhalation, Every 4 hours PRN    aspirin (ECOTRIN) 81 mg, Oral, Daily    atorvastatin (LIPITOR) 40 mg, Oral    azelastine (ASTELIN) 137 mcg (0.1 %) nasal spray 30    colchicine (COLCRYS) 0.6 mg, Oral    famotidine (PEPCID) 20 mg, Oral, 2 times daily    fluticasone propionate (FLONASE) 50 mcg, Each Nostril, Daily    FREESTYLE FREEDOM LITE kit     FREESTYLE LANCETS 28 gauge lancets     FREESTYLE LITE STRIPS Strp     gabapentin (NEURONTIN) 300 mg, Oral, 3 times daily    glimepiride (AMARYL) 2 mg, Oral, With breakfast    hydroCHLOROthiazide (HYDRODIURIL) 25 mg, Oral    lisinopriL (PRINIVIL,ZESTRIL) 5 mg, Oral, Daily    meclizine (ANTIVERT) 12.5 mg tablet TAKE 2 TABLETS BY MOUTH THREE TIMES DAILY AS NEEDED FOR DIZZINESS    metFORMIN (GLUCOPHAGE-XR) 1,000 mg, Oral, 2 times daily with meals    metoprolol succinate (TOPROL-XL) 25 MG 24 hr tablet 30    montelukast (SINGULAIR) 10 mg, Oral, Nightly    ondansetron (ZOFRAN-ODT) 4 mg, Oral, Every 8 hours PRN    potassium chloride (MICRO-K) 10 MEQ CpSR 10 mEq, Oral, Daily    sucralfate (CARAFATE) 1 g, Oral, Before meals & nightly    tamsulosin (FLOMAX) 0.4 mg Cap 1 capsule, Oral      I have reviewed the PMH, social history, FamilyHx, surgical history, allergies and medications documented / confirmed by the patient at the time of this visit.  Review of Systems   Constitutional:  Negative for chills, fatigue, fever and unexpected weight change.  "  HENT:  Positive for congestion. Negative for ear pain and sore throat.    Eyes:  Negative for redness and visual disturbance.   Respiratory:  Negative for cough and shortness of breath.    Cardiovascular:  Negative for chest pain and palpitations.   Gastrointestinal:  Negative for nausea and vomiting.   Endocrine: Negative for cold intolerance and heat intolerance.   Genitourinary:  Negative for difficulty urinating and hematuria.   Musculoskeletal:  Positive for arthralgias. Negative for myalgias.   Skin:  Negative for rash and wound.   Allergic/Immunologic: Negative for environmental allergies and food allergies.   Neurological:  Negative for weakness and headaches.   Hematological:  Negative for adenopathy. Does not bruise/bleed easily.   Psychiatric/Behavioral:  Negative for sleep disturbance. The patient is not nervous/anxious.      Objective:   /74   Pulse 75   Ht 5' 9" (1.753 m)   Wt 88.9 kg (195 lb 14.4 oz)   SpO2 99%   BMI 28.93 kg/m²   Physical Exam  Vitals and nursing note reviewed.   Constitutional:       General: He is not in acute distress.     Appearance: He is well-developed. He is not diaphoretic.   HENT:      Head: Normocephalic and atraumatic.      Right Ear: Tympanic membrane, ear canal and external ear normal. There is no impacted cerumen.      Left Ear: Tympanic membrane, ear canal and external ear normal. There is no impacted cerumen.      Nose: Congestion present. No rhinorrhea.      Mouth/Throat:      Pharynx: Posterior oropharyngeal erythema present.   Eyes:      Extraocular Movements: Extraocular movements intact.      Pupils: Pupils are equal, round, and reactive to light.   Neck:      Vascular: No carotid bruit.   Cardiovascular:      Rate and Rhythm: Normal rate.      Pulses: Normal pulses.   Pulmonary:      Effort: Pulmonary effort is normal. No respiratory distress.      Breath sounds: Normal breath sounds.   Abdominal:      General: Bowel sounds are normal.      " Palpations: Abdomen is soft.   Musculoskeletal:         General: Normal range of motion.      Cervical back: Normal range of motion and neck supple.   Lymphadenopathy:      Cervical: No cervical adenopathy.   Skin:     General: Skin is warm and dry.      Capillary Refill: Capillary refill takes less than 2 seconds.      Findings: No rash.   Neurological:      General: No focal deficit present.      Mental Status: He is alert and oriented to person, place, and time. Mental status is at baseline.      Cranial Nerves: No cranial nerve deficit.      Motor: No weakness.      Gait: Gait normal.   Psychiatric:         Attention and Perception: He is attentive.         Mood and Affect: Mood normal. Mood is not anxious or depressed. Affect is not labile, blunt, angry or inappropriate.         Speech: He is communicative. Speech is not rapid and pressured, delayed, slurred or tangential.         Behavior: Behavior normal. Behavior is not agitated, slowed, aggressive, withdrawn, hyperactive or combative.         Thought Content: Thought content normal. Thought content is not paranoid or delusional. Thought content does not include homicidal or suicidal ideation. Thought content does not include homicidal or suicidal plan.         Cognition and Memory: Memory is not impaired.         Judgment: Judgment normal. Judgment is not impulsive or inappropriate.         Assessment:     1. Type 2 diabetes mellitus with diabetic peripheral angiopathy without gangrene, without long-term current use of insulin    2. Sinus problem    3. Hypokalemia    4. Encounter for long-term (current) use of medications      MDM:   High medical complexity.  Moderate to high risk.  Total time: 42 minutes.  This includes total time spent on the encounter, which includes face to face time and non-face to face time preparing to see the patient (eg, review of previous medical records, tests), Obtaining and/or reviewing separately obtained history, documenting  clinical information in the electronic or other health record, independently interpreting results (not separately reported)/communicating results to the patient/family/caregiver, and/or care coordination (not separately reported).    I have Reviewed and summarized old records.  I have performed thorough medication reconciliation today and discussed risk and benefits of medications.  I have reviewed labs and discussed with patient.  All questions were answered.  I am requesting old records and will review them once they are available.  Cardiology    I have signed for the following orders AND/OR meds.  Orders Placed This Encounter   Procedures    Potassium     Standing Status:   Future     Standing Expiration Date:   2/6/2025    Ambulatory referral/consult to Podiatry     Standing Status:   Future     Standing Expiration Date:   1/7/2025     Referral Priority:   Routine     Referral Type:   Consultation     Referral Reason:   Specialty Services Required     Requested Specialty:   Podiatry     Number of Visits Requested:   1     Medications Ordered This Encounter   Medications    glimepiride (AMARYL) 4 MG tablet     Sig: Take 0.5 tablets (2 mg total) by mouth daily with breakfast.     Dispense:  90 tablet     Refill:  1     Please inactivate all prior scripts with same name and strength including any scripts on hold.    montelukast (SINGULAIR) 10 mg tablet     Sig: Take 1 tablet (10 mg total) by mouth every evening.     Dispense:  30 tablet     Refill:  0    potassium chloride (MICRO-K) 10 MEQ CpSR     Sig: Take 1 capsule (10 mEq total) by mouth once daily.     Dispense:  30 capsule     Refill:  11        Follow up in about 6 months (around 6/7/2024), or if symptoms worsen or fail to improve, for Med refills.  Future Appointments       Date Provider Specialty Appt Notes    3/19/2024 Garry Sexton DPM Podiatry .    4/9/2024 Jovani Alba MD Cardiology annual    6/7/2024  Lab .          If no improvement in  symptoms or symptoms worsen, advised to call/follow-up at clinic or go to ER. Patient voiced understanding and all questions/concerns were addressed.   DISCLAIMER: This note was compiled by using a speech recognition dictation system and therefore please be aware that typographical / speech recognition errors can and do occur.  Please contact me if you see any errors specifically.    Charles Nash M.D.       Office: [phone] [address] [place] [address]  FAX: [phone]

## 2023-12-09 PROBLEM — J34.9 SINUS PROBLEM: Chronic | Status: ACTIVE | Noted: 2018-03-23

## 2023-12-09 RX ORDER — POTASSIUM CHLORIDE 750 MG/1
10 CAPSULE, EXTENDED RELEASE ORAL DAILY
Qty: 30 CAPSULE | Refills: 11
Start: 2023-12-09 | End: 2024-12-08

## 2023-12-09 NOTE — ASSESSMENT & PLAN NOTE
Start Singulair to current regimen consider ENT referral no improvement.  Avoid triggers.  Discussed condition course and signs and symptoms to expect.  Patient advised take anti-inflammatories and or Tylenol for pain or fever.  ER precautions.  Call MD or follow-up to clinic if not improving or worsening symptoms.

## 2023-12-09 NOTE — ASSESSMENT & PLAN NOTE
Decrease glimepiride.  Patient is still declining GLP 1 medication at this time.  Wife is doing well on the medication.  Update A1c soon.We will plan to monitor hemoglobin A1c at designated intervals 3 to 6 months.  I recommend ongoing Education for diabetic diet and exercise protocol.  We will continue to monitor for side effects.    Please be advised of symptoms to monitor for and to notify me immediately if persistent or worsening.  Follow up with Ophthalmology/Optometry and Podiatry at least annually.     Headache x 3 days. Mother at bedside reports giving him several different OTC meds w/ no relief (Motrin, claritin). Denies injury. No fevers noted. Mother reports concern for sinus infection due to occasional cough and nasal congestion.

## 2023-12-09 NOTE — ASSESSMENT & PLAN NOTE
Complete history and physical was completed today.  Complete and thorough medication reconciliation was performed.  Discussed risks and benefits of medications.  Advised patient on orders and health maintenance.  We discussed old records and old labs if available.  Will request any records not available through epic.  Continue current medications listed on your summary sheet.     Note Text (......Xxx Chief Complaint.): This diagnosis correlates with the Detail Level: Zone

## 2023-12-13 ENCOUNTER — PATIENT MESSAGE (OUTPATIENT)
Dept: ADMINISTRATIVE | Facility: HOSPITAL | Age: 63
End: 2023-12-13
Payer: MEDICARE

## 2023-12-14 ENCOUNTER — PATIENT OUTREACH (OUTPATIENT)
Dept: ADMINISTRATIVE | Facility: HOSPITAL | Age: 63
End: 2023-12-14
Payer: MEDICARE

## 2023-12-14 NOTE — LETTER
December 14, 2023        Ramiro SUNG MD  [address]  [place] [address]             [facility]  [address]  [facility] [address]  Phone: [phone]   Patient: Luis Lozano   MR Number: [id number]   YOB: 1960   Date of Visit: 12/14/2023       Dear Dr. Correa:    To whom it may concern     We are seeing Luis Lozano, YOB: 1960, at [facility]. Charles Nash MD is their primary care physician. To help with our health maintenance records could you please send the following:     Last Diabetic Eye Exam Report that states Positive or Negative Retinopathy.    Please send fax to [phone] Attention Johanna MAYS LPN Care Coordination.    Thank-you in advance for your assistance. If you have any questions or concerns, please don't hesitate to contact me at [phone].     Johanna MAYS LPN  Care Coordination Department  [facility]  Phone number [phone]

## 2023-12-14 NOTE — PROGRESS NOTES
[place] Msg: Pt replies he has seen Dr. Correa about 2 weeks ago. Fax form sent for Diabetic Eye Exam result.

## 2023-12-14 NOTE — LETTER
January 2, 2024        Ramiro SUNG MD  [address]  [place] [address]             [facility]  [address]  [facility] [address]  Phone: [phone]   Patient: Luis Lozano   MR Number: [id number]   YOB: 1960   Date of Visit: 12/14/2023       Dear Dr. Correa:       We are seeing Luis Lozano, YOB: 1960, at [facility]. Charles Nash MD is their primary care physician. To help with our health maintenance records could you please send the following:     Last Diabetic Eye Exam Report that states Positive or Negative Retinopathy.    Please send fax to [phone] Attention Johanna MAYS LPN Care Coordination.    Thank-you in advance for your assistance. If you have any questions or concerns, please don't hesitate to contact me at [phone].     Johanna MAYS LPN  Care Coordination Department  [facility]  Phone number [phone]

## 2023-12-14 NOTE — LETTER
December 14, 2023        Ramiro SUNG MD  [address]  [place] [address]             [facility]  [address]  [facility] [address]  Phone: [phone]   Patient: Luis Lozano   MR Number: [id number]   YOB: 1960   Date of Visit: 12/14/2023       Dear Dr. Correa:    To whom it may concern     We are seeing Luis Lozano, YOB: 1960, at [facility]. Charles Nash MD is their primary care physician. To help with our health maintenance records could you please send the following:     Last Diabetic Eye Exam Report that states Positive or Negative Retinopathy.    Please send fax to [phone] Attention Johanna MAYS LPN Care Coordination.    Thank-you in advance for your assistance. If you have any questions or concerns, please don't hesitate to contact me at [phone].     Johanna MAYS LPN  Care Coordination Department  [facility]  Phone number [phone]

## 2023-12-14 NOTE — LETTER
January 19, 2024        Ramiro SUNG MD  [address]  [place] [address]                                                                            3rd Request             [facility]  [address]  [facility] [address]  Phone: [phone]   Patient: Luis Lozano   MR Number: [id number]   YOB: 1960   Date of Visit: 12/14/2023       Dear Dr. Correa:       We are seeing Luis Lozano, YOB: 1960, at [facility]. Charles Nash MD is their primary care physician. To help with our health maintenance records could you please send the following:     Last Diabetic Eye Exam Report that states Positive or Negative Retinopathy.    Please send fax to [phone] Attention Johanna MAYS LPN Care Coordination.    Thank-you in advance for your assistance. If you have any questions or concerns, please don't hesitate to contact me at [phone].     Johanna MAYS LPN  Care Coordination Department  [facility]  Phone number [phone]

## 2024-01-15 PROBLEM — Z09 HOSPITAL DISCHARGE FOLLOW-UP: Status: RESOLVED | Noted: 2023-10-13 | Resolved: 2024-01-15

## 2024-02-09 ENCOUNTER — E-VISIT (OUTPATIENT)
Dept: FAMILY MEDICINE | Facility: CLINIC | Age: 64
End: 2024-02-09
Payer: MEDICARE

## 2024-02-09 DIAGNOSIS — R51.9 NONINTRACTABLE HEADACHE, UNSPECIFIED CHRONICITY PATTERN, UNSPECIFIED HEADACHE TYPE: ICD-10-CM

## 2024-02-09 DIAGNOSIS — J01.00 SUBACUTE MAXILLARY SINUSITIS: Primary | ICD-10-CM

## 2024-02-09 PROCEDURE — 99421 OL DIG E/M SVC 5-10 MIN: CPT | Mod: ,,, | Performed by: STUDENT IN AN ORGANIZED HEALTH CARE EDUCATION/TRAINING PROGRAM

## 2024-02-09 RX ORDER — DOXYCYCLINE HYCLATE 100 MG
100 TABLET ORAL 2 TIMES DAILY
Qty: 14 TABLET | Refills: 0 | Status: SHIPPED | OUTPATIENT
Start: 2024-02-09 | End: 2024-02-16

## 2024-02-09 RX ORDER — BUTALBITAL, ACETAMINOPHEN AND CAFFEINE 50; 325; 40 MG/1; MG/1; MG/1
1 TABLET ORAL EVERY 6 HOURS PRN
Qty: 12 TABLET | Refills: 0 | Status: SHIPPED | OUTPATIENT
Start: 2024-02-09 | End: 2024-02-12

## 2024-02-09 NOTE — PROGRESS NOTES
Patient ID: Luis Lozano is a 63 y.o. male.    Chief Complaint: URI (Entered automatically based on patient selection in Patient Portal.)          274}  The patient initiated a request through [facility] on 2/9/2024 for evaluation and management with a chief complaint of URI (Entered automatically based on patient selection in Patient Portal.)     I evaluated the questionnaire submission on 02/09/2024 .    [facility] Peq Evisit Upper Respitatory/Cough Questionnaire    2/9/2024  3:10 PM CST - Filed by Patient   Do you agree to participate in an E-Visit? Yes   If you have any of the following symptoms, please present to your local ER or call 911:  I acknowledge   What is the main issue that you would like for your doctor to address today? Wooshing lightheaded feeling, tighthness around both eyes. Some sinus issues, but clear. No fever, no chest pains, no right arm, jaw pain, no sweating.   Are you able to take your vital signs? Yes   Systolic Blood Pressure: 129   Diastolic Blood Pressure: 69   Weight: 189   Height: 60   Pulse: 80   Temperature: 98.4   Respiration rate: 86   Pulse Oxygen: 98   What symptoms do you currently have?  Headache;  Nasal Congestion;  Pain around the nose and face   Have you ever smoked? I have never smoked   Have you had a fever? No   When did your symptoms first appear? 1/31/2024   In the last two weeks, have you been in close contact with someone who has COVID-19 or the Flu? Yes , Covid-19   In the last two weeks, have you worked or volunteered in a healthcare facility or as a ? Healthcare facilities include a hospital, medical or dental clinic, long-term care facility, or nursing home No   Do you live in a long-term care facility, nursing home, group home, or homeless shelter? No   List what you have done or taken to help your symptoms. Overcounter meds, fluticacone propionate  nasal spray   How severe are your symptoms? Moderate   Have your symptoms improved since they first  appeared? No change   Have you taken an at home Covid test? No   Have you taken a Flu test? Yes   What were the results? Negative   Have you been fully vaccinated for COVID? (2 Pfizer, 2 Moderna or 1 Hugh & Hugh vaccine injections) Yes   Have you received a booster? Yes   Have you recieved a Flu shot? Yes   When did you recieve your Flu shot? 8/8/2023   Do you have transportation to get tested for COVID if it is indicated and ordered for you at an [facility] location? Yes   Provide any information you feel is important to your history not asked above    Please attach any relevant images or files           Active Problem List with Overview Notes    Diagnosis Date Noted    Hypokalemia 12/07/2023     Lab Results   Component Value Date    K 3.4 (L) 11/09/2023           Upper respiratory tract infection 10/13/2023     Acute on recurrent.  Patient having allergy symptoms.  Patient took Afrin and had heart symptoms from taking this medication.  He went to the emergency room and was evaluated.  Patient also followed up with Cardiology.    He reports taking nasal spray over-the-counter which has improved his symptoms.  Denies any fever chills body aches.      Nausea 06/21/2022     Patient needing a refill on Zofran      Type 2 diabetes mellitus with diabetic peripheral angiopathy without gangrene, without long-term current use of insulin 06/21/2022 December 2023:  Patient reports having some hypoglycemic episodes on glimepiride at higher dose.  He is currently stable on half tablet.  Diabetes Medications               glimepiride (AMARYL) 4 MG tablet Take 0.5 tablets (2 mg total) by mouth daily with breakfast.    metFORMIN (GLUCOPHAGE-XR) 500 MG ER 24hr tablet TAKE 2 TABLETS BY MOUTH TWICE DAILY WITH MEALS         October 2023:  Patient due for A1c.  Reports no issues with blood sugar.May 2023:  Patient doing well on current medication regimen.  No side effects reported.  December 2022: Patient reports he was unable  "to get the Ozempic.  He is still taking metformin.  Patient denies any history of thyroid cancer, pancreatitis, MEN syndrome.   See other diabetes problem.  Patient does follow with pain management.  He is on gabapentin 300 , three times daily  Diabetes Management Status    Statin: Taking  ACE/ARB: Taking    Screening or Prevention Patient's value Goal Complete/Controlled?   HgA1C Testing and Control   Lab Results   Component Value Date    HGBA1C 6.8 (H) 11/09/2023      Annually/Less than 8% Yes   Lipid profile : 11/09/2023 Annually Yes   LDL control Lab Results   Component Value Date    LDLCALC 95.6 11/09/2023    Annually/Less than 100 mg/dl  Yes   Nephropathy screening Lab Results   Component Value Date    LABMICR 9.0 11/09/2023     Lab Results   Component Value Date    PROTEINUA Negative 05/14/2021     No results found for: "UTPCR"   Annually Yes   Blood pressure BP Readings from Last 1 Encounters:   12/07/23 114/74    Less than 140/90 Yes   Dilated retinal exam : 02/16/2023 Annually Yes   Foot exam   : 04/12/2022 Annually No         Bilateral plantar fasciitis 11/11/2021    Benign prostatic hyperplasia 02/11/2021    Gastroesophageal reflux disease without esophagitis 02/11/2021     Chronic.  Stable.  Patient had relief of symptoms with famotidine and Carafate.  Patient reports that his stomach will get upset when eating spicy foods especially peppers.      Neuropathy 02/11/2021    Lightheadedness 01/12/2021     Chronic.  May 2023: Patient continues to have dizziness spells.  Patient does have chronic sinus issues and vertigo.  Patient has done well with vestibular therapy in the past.  Previous history:  Intermittent control.  Patient takes meclizine as needed for dizziness which does help.  Patient requesting refill.      Palpitations 01/12/2021    Left wrist pain 09/09/2020     Subacute.  Started a couple of weeks ago when patient had a type fall on to his left wrist.  Patient reports that it did not hurt " right away and did not swell.  After few days it started to ache and is still hurting to this day.  Patient has been taking ibuprofen and had x-ray at an orthopedic office but I do not have the records.  Patient was told that the x-ray was negative.  Denies any weakness of the hand.      Hyperlipidemia associated with type 2 diabetes mellitus 08/05/2019 December 2022:  Reviewed labs.  Initial HPI:  Chronic.  Control is uncertain.  Due for labs.  On atorvastatin 20 mg.  Reports compliance.  No side effects reported.  November 2019:  Patient has been on increased dose of atorvastatin 40 mg and is not having any side effects currently.  September 2020:  Compliant with medications.  Patient is due for fasting lipid panel.  December 2020:  Patient is due for updated lipid panel.  Will plan for fasting blood work.  Continue medication regimen.  CHRONIC. STABLE. Lab analysis reviewed.   (-) CP, SOB, abdominal pain, N/V/D, constipation, jaundice, skin changes.  (-) Myalgias  Lab Results   Component Value Date    CHOL 193 11/08/2021    CHOL 176 07/15/2021    CHOL 160 11/22/2019     Lab Results   Component Value Date    HDL 64 (H) 11/08/2021    HDL 49 07/15/2021    HDL 53 11/22/2019     Lab Results   Component Value Date    LDLCALC 114 11/08/2021    LDLCALC 112 07/15/2021    LDLCALC 89.0 11/22/2019     Lab Results   Component Value Date    TRIG 75 11/08/2021    TRIG 76 07/15/2021    TRIG 90 11/22/2019     Lab Results   Component Value Date    CHOLHDL 3.02 11/08/2021    CHOLHDL 3.59 07/15/2021    CHOLHDL 33.1 11/22/2019     Lab Results   Component Value Date    TOTALCHOLEST 3.0 11/22/2019    TOTALCHOLEST 3.2 08/05/2019    TOTALCHOLEST 4.0 06/03/2017       Lab Results   Component Value Date    ALT 19 06/20/2022    AST 17 06/20/2022    ALKPHOS 44 (L) 06/20/2022    BILITOT 0.8 06/20/2022     ======================================================  The 10-year ASCVD risk score (Johan LARA, et al., 2019) is: 24.9%    Values used  to calculate the score:      Age: 62 years      Sex: Male      Is Non- : Yes      Diabetic: Yes      Tobacco smoker: No      Systolic Blood Pressure: 126 mmHg      Is BP treated: Yes      HDL Cholesterol: 64 mg/dL      Total Cholesterol: 193 mg/dL        Type 2 diabetes mellitus with hyperglycemia, without long-term current use of insulin 08/05/2019 June 2022:  Patient has been lost to follow-up since December of 2020. Today we had a long discussion about new or medications such as Ozempic try to get him off of some of the oral medication and avoid progression to insulin.  Patient and his wife were in agreement with trying this medication.  He denies any history of pancreatitis, thyroid cancer, MEN syndrome.  November 2019:  ReviewedDiabetes Management StatusStatin: TakingACE/ARB: Not taking  MARCH 2020:  Reviewed Diabetes Management StatusStatin: TakingACE/ARB: Taking  SEPTEMBER 2020:  Reviewed Diabetes Management Status Statin: TakingACE/ARB: Taking  December 2020:  Reviewed Diabetes Management StatusStatin: TakingACE/ARB: Taking patient admits to diet noncompliance and medication not being taken as prescribed.  Patient reports that he decrease the dosage of metformin on his own.  Diabetes Management Status    Statin: Taking  ACE/ARB: Taking    Screening or Prevention Patient's value Goal Complete/Controlled?   HgA1C Testing and Control   Lab Results   Component Value Date    HGBA1C 8.3 (H) 06/20/2022      Annually/Less than 8% No   Lipid profile : 11/08/2021 Annually No   LDL control Lab Results   Component Value Date    LDLCALC 114 11/08/2021    Annually/Less than 100 mg/dl  No   Nephropathy screening Lab Results   Component Value Date    LABMICR 6.0 06/20/2022     Lab Results   Component Value Date    PROTEINUA Negative 05/14/2021     No results found for: UTPCR   Annually Yes   Blood pressure BP Readings from Last 1 Encounters:   06/20/22 128/84    Less than 140/90 Yes   Dilated  retinal exam : 09/07/2021 Annually Yes   Foot exam   : 09/09/2020 Annually No         Radiculopathy, lumbar region 08/05/2019     Chronic.  Stable.  Patient sees pain management.  Taking gabapentin 300 mg per medication list.  Needs refill.  Reports compliance.  No side effects reported.      Vitamin D deficiency 08/05/2019     Chronic.  Control is uncertain.  Due for vitamin-D level.        Encounter for long-term (current) use of medications 08/05/2019 December 2023:  Reviewed labs.  October 2023: Reviewed labs.  May 2023: Reviewed labs.  December 2022: Reviewed labs.  08/06/2019 : CHRONIC long-term drug therapy for managed conditions. See medication list. Reports compliance.  No side effects reported.  Routine lab work is being monitored.  Patient does  need refills today.   November 2019:  CHRONIC. Stable. Compliant with medications for managed conditions. See medication list. No SE reported. Routine lab analysis is being monitored. Refills were addressed.  SEPTEMBER 2020:  Reviewed labs.  CHRONIC. Stable. Compliant with medications for managed conditions. See medication list. No SE reported. Routine lab analysis is being monitored. Refills were addressed.  DECEMBER 2020:  Reviewed labs.  CHRONIC. Stable. Compliant with medications for managed conditions. See medication list. No SE reported. Routine lab analysis is being monitored. Refills were addressed.  June 2022:  Reviewed labs.  Lab Results   Component Value Date    WBC 8.04 03/30/2023    HGB 15.7 03/30/2023    HCT 46.3 03/30/2023    MCV 86 03/30/2023     03/30/2023       Chemistry        Component Value Date/Time     11/09/2023 0712    K 3.4 (L) 11/09/2023 0712    CL 99 11/09/2023 0712    CO2 30 (H) 11/09/2023 0712    BUN 16 11/09/2023 0712    CREATININE 1.0 11/09/2023 0712     11/09/2023 0712        Component Value Date/Time    CALCIUM 10.0 11/09/2023 0712    ALKPHOS 42 (L) 11/09/2023 0712    AST 18 11/09/2023 0712    ALT 18  11/09/2023 0712    BILITOT 0.9 11/09/2023 0712    ESTGFRAFRICA >60.0 06/20/2022 1359    EGFRNONAA >60.0 06/20/2022 1359        Lab Results   Component Value Date    TSH 0.796 08/05/2019    B7ETMWN 7.2 08/05/2019    T3FREE 2.6 08/05/2019     =================================================      Sinus problem 03/23/2018     Chronic.  Intermittent control.  Patient has been on Flonase intermittently.  He has had sinus issues for year.  Also takes Astelin.      Hypertension associated with diabetes 09/23/2013 December 2022: Blood pressure is well controlled.  Hypertension Medications               hydroCHLOROthiazide (HYDRODIURIL) 25 MG tablet Take 1 tablet (25 mg total) by mouth once daily.    lisinopriL (PRINIVIL,ZESTRIL) 5 MG tablet Take 1 tablet (5 mg total) by mouth once daily.            June 2022:  Patient has been lost to follow-up since December of 2020. Blood pressure remains controlled at this time on current medication regimen.    December 2020:  Blood pressure is well controlled today.  Patient reports compliance with medication regimen.  SEPTEMBER 2020:  Patient with recent elevations in his blood pressures.  Recently went to ER for chest pain but was given medication for reflux that resolve the pain.  Patient has been doing well since then.  CHRONIC. STABLE. BP Reviewed.  Compliant with BP medications. No SE reported.   (-) CP, SOB, palpitations, dizziness, lightheadedness, HA, arm numbness, tingling or weakness, syncope.  Creatinine   Date Value Ref Range Status   06/20/2022 1.1 0.5 - 1.4 mg/dL Final     Initial HPI:  Chronic.  Stable.  Patient taking hydrochlorothiazide 12.5 mg amlodipine 2.5 mg.  Reports compliance.  No side effects reported.  No symptoms of chest pain shortness of breath blurry vision etc.  November 2019:  Patient reports that he did have episode of vertigo and went to the ER but was given meclizine and Tessalon Perles for the cough.  Has not been having issues since  then.CHRONIC. STABLE. BP Reviewed.  Compliant with BP medications. No SE reported.     Results for orders placed or performed during the hospital encounter of 02/04/22   EKG 12-lead    Collection Time: 02/04/22  3:28 AM    Narrative    Test Reason : R07.9,    Vent. Rate : 066 BPM     Atrial Rate : 066 BPM     P-R Int : 220 ms          QRS Dur : 092 ms      QT Int : 404 ms       P-R-T Axes : 052 064 017 degrees     QTc Int : 423 ms    Sinus rhythm with 1st degree A-V block  Septal infarct (cited on or before 02-NOV-2020)  Abnormal ECG  When compared with ECG of 04-FEB-2022 01:18,  No significant change was found  Confirmed by Karla Sam MD ([id number]) on 2/5/2022 12:28:23 AM    Referred By: [email or web address]   SELF           Confirmed By:Karla Sam MD           Hyperlipidemia 09/23/2013     CHRONIC. STABLE. Lab analysis reviewed.   (-) CP, SOB, abdominal pain, N/V/D, constipation, jaundice, skin changes.  (-) Myalgias  Lab Results   Component Value Date    CHOL 193 11/08/2021    CHOL 176 07/15/2021    CHOL 160 11/22/2019     Lab Results   Component Value Date    HDL 64 (H) 11/08/2021    HDL 49 07/15/2021    HDL 53 11/22/2019     Lab Results   Component Value Date    LDLCALC 114 11/08/2021    LDLCALC 112 07/15/2021    LDLCALC 89.0 11/22/2019     Lab Results   Component Value Date    TRIG 75 11/08/2021    TRIG 76 07/15/2021    TRIG 90 11/22/2019     Lab Results   Component Value Date    CHOLHDL 3.02 11/08/2021    CHOLHDL 3.59 07/15/2021    CHOLHDL 33.1 11/22/2019     Lab Results   Component Value Date    TOTALCHOLEST 3.0 11/22/2019    TOTALCHOLEST 3.2 08/05/2019    TOTALCHOLEST 4.0 06/03/2017     Lab Results   Component Value Date    ALT 19 06/20/2022    AST 17 06/20/2022    ALKPHOS 44 (L) 06/20/2022    BILITOT 0.8 06/20/2022   ======================================================  The ASCVD Risk score (Shana LÓPEZ Jr., et al., 2013) failed to calculate for the following reasons:    The patient has a prior MI or stroke diagnosis           Recent Labs Obtained:  Lab Results   Component Value Date    WBC 8.04 03/30/2023    HGB 15.7 03/30/2023    HCT 46.3 03/30/2023    MCV 86 03/30/2023     03/30/2023     11/09/2023    K 3.4 (L) 11/09/2023     11/09/2023    CREATININE 1.0 11/09/2023    EGFRNORACEVR >60.0 11/09/2023    HGBA1C 6.8 (H) 11/09/2023      Review of patient's allergies indicates:   Allergen Reactions    Oxymetazoline Palpitations       Encounter Diagnoses   Name Primary?    Subacute maxillary sinusitis Yes    Nonintractable headache, unspecified chronicity pattern, unspecified headache type         No orders of the defined types were placed in this encounter.     Medications Ordered This Encounter   Medications    butalbital-acetaminophen-caffeine -40 mg (FIORICET, ESGIC) -40 mg per tablet     Sig: Take 1 tablet by mouth every 6 (six) hours as needed for Headaches (No more than 2-3 days a week.).     Dispense:  12 tablet     Refill:  0    doxycycline (VIBRA-TABS) 100 MG tablet     Sig: Take 1 tablet (100 mg total) by mouth 2 (two) times daily. for 7 days     Dispense:  14 tablet     Refill:  0    Recommend for patient to take home COVID test if negative and patient develops a productive cough or sinus pain can take antibiotic    E-Visit Time Tracking:    Day 1 Time (in minutes): 8    Total Time (in minutes): 8   This is the extent of this pleasant patient's concerns at this present time. He did not feel chest pain upon exertion. No unilateral leg swelling, calf tenderness, or calf pain.    274}

## 2024-02-22 DIAGNOSIS — E11.51 TYPE 2 DIABETES MELLITUS WITH DIABETIC PERIPHERAL ANGIOPATHY WITHOUT GANGRENE, WITHOUT LONG-TERM CURRENT USE OF INSULIN: ICD-10-CM

## 2024-02-22 RX ORDER — ONDANSETRON 4 MG/1
TABLET, ORALLY DISINTEGRATING ORAL
Qty: 30 TABLET | Refills: 0 | Status: SHIPPED | OUTPATIENT
Start: 2024-02-22 | End: 2024-05-20

## 2024-02-22 NOTE — TELEPHONE ENCOUNTER
Refill Routing Note   Medication(s) are not appropriate for processing by [facility] for the following reason(s):        Outside of protocol    [facility] action(s):  Route     Requires labs : Yes             Appointments  past 12m or future 3m with PCP    Date Provider   Last Visit   12/7/2023 Charles Nash MD   Next Visit   Visit date not found Charles Nash MD   ED visits in past 90 days: 0        Note composed:3:38 PM 02/22/2024

## 2024-02-22 NOTE — TELEPHONE ENCOUNTER
Care Due:                  Date            Visit Type   Department     Provider  --------------------------------------------------------------------------------                                EP -                              PRIMARY      [facility] FAMILY  Last Visit: 12-      CARE ([facility])   MEDICINE       Charlse Nash  Next Visit: None Scheduled  None         None Found                                                            Last  Test          Frequency    Reason                     Performed    Due Date  --------------------------------------------------------------------------------    HBA1C.......  6 months...  glimepiride, metFORMIN...  11- 05-    [facility] Embedded Care Due Messages. Reference number: [id number].   2/22/2024 1:13:21 PM CST

## 2024-04-09 ENCOUNTER — PATIENT MESSAGE (OUTPATIENT)
Dept: FAMILY MEDICINE | Facility: CLINIC | Age: 64
End: 2024-04-09
Payer: MEDICARE

## 2024-04-11 DIAGNOSIS — E11.59 HYPERTENSION ASSOCIATED WITH DIABETES: ICD-10-CM

## 2024-04-11 DIAGNOSIS — I15.2 HYPERTENSION ASSOCIATED WITH DIABETES: ICD-10-CM

## 2024-04-11 DIAGNOSIS — Z79.899 ENCOUNTER FOR LONG-TERM (CURRENT) USE OF MEDICATIONS: ICD-10-CM

## 2024-04-11 NOTE — TELEPHONE ENCOUNTER
No care due was identified.  [facility] Embedded Care Due Messages. Reference number: [id number].   4/11/2024 11:34:02 AM CDT

## 2024-04-12 RX ORDER — HYDROCHLOROTHIAZIDE 25 MG/1
25 TABLET ORAL
Qty: 90 TABLET | Refills: 1 | Status: SHIPPED | OUTPATIENT
Start: 2024-04-12

## 2024-04-12 NOTE — TELEPHONE ENCOUNTER
Refill Routing Note   Medication(s) are not appropriate for processing by [facility] for the following reason(s):        Required labs abnormal    [facility] action(s):  Defer               Appointments  past 12m or future 3m with PCP    Date Provider   Last Visit   12/7/2023 Charles Nash MD   Next Visit   Visit date not found Charles Nash MD   ED visits in past 90 days: 0        Note composed:11:00 AM 04/12/2024

## 2024-04-16 ENCOUNTER — TELEPHONE (OUTPATIENT)
Dept: FAMILY MEDICINE | Facility: CLINIC | Age: 64
End: 2024-04-16

## 2024-04-16 ENCOUNTER — OFFICE VISIT (OUTPATIENT)
Dept: FAMILY MEDICINE | Facility: CLINIC | Age: 64
End: 2024-04-16
Payer: MEDICARE

## 2024-04-16 VITALS
OXYGEN SATURATION: 96 % | WEIGHT: 188.69 LBS | HEART RATE: 78 BPM | DIASTOLIC BLOOD PRESSURE: 64 MMHG | TEMPERATURE: 98 F | SYSTOLIC BLOOD PRESSURE: 118 MMHG | HEIGHT: 69 IN | BODY MASS INDEX: 27.95 KG/M2 | RESPIRATION RATE: 18 BRPM

## 2024-04-16 DIAGNOSIS — E78.5 HYPERLIPIDEMIA ASSOCIATED WITH TYPE 2 DIABETES MELLITUS: Chronic | ICD-10-CM

## 2024-04-16 DIAGNOSIS — I15.2 HYPERTENSION ASSOCIATED WITH DIABETES: Chronic | ICD-10-CM

## 2024-04-16 DIAGNOSIS — E11.59 HYPERTENSION ASSOCIATED WITH DIABETES: Chronic | ICD-10-CM

## 2024-04-16 DIAGNOSIS — Z02.9 ADMINISTRATIVE ENCOUNTER: Primary | ICD-10-CM

## 2024-04-16 DIAGNOSIS — E11.65 TYPE 2 DIABETES MELLITUS WITH HYPERGLYCEMIA, WITHOUT LONG-TERM CURRENT USE OF INSULIN: Chronic | ICD-10-CM

## 2024-04-16 DIAGNOSIS — Z79.899 ENCOUNTER FOR LONG-TERM (CURRENT) USE OF MEDICATIONS: Chronic | ICD-10-CM

## 2024-04-16 DIAGNOSIS — E11.69 HYPERLIPIDEMIA ASSOCIATED WITH TYPE 2 DIABETES MELLITUS: Chronic | ICD-10-CM

## 2024-04-16 PROBLEM — J06.9 UPPER RESPIRATORY TRACT INFECTION: Status: RESOLVED | Noted: 2023-10-13 | Resolved: 2024-04-16

## 2024-04-16 PROBLEM — M25.532 LEFT WRIST PAIN: Chronic | Status: RESOLVED | Noted: 2020-09-09 | Resolved: 2024-04-16

## 2024-04-16 PROBLEM — M72.2 BILATERAL PLANTAR FASCIITIS: Status: RESOLVED | Noted: 2021-11-11 | Resolved: 2024-04-16

## 2024-04-16 PROBLEM — R00.2 PALPITATIONS: Status: RESOLVED | Noted: 2021-01-12 | Resolved: 2024-04-16

## 2024-04-16 PROCEDURE — 3288F FALL RISK ASSESSMENT DOCD: CPT | Mod: CPTII,S$GLB,, | Performed by: NURSE PRACTITIONER

## 2024-04-16 PROCEDURE — 3074F SYST BP LT 130 MM HG: CPT | Mod: CPTII,S$GLB,, | Performed by: NURSE PRACTITIONER

## 2024-04-16 PROCEDURE — 99214 OFFICE O/P EST MOD 30 MIN: CPT | Mod: S$GLB,,, | Performed by: NURSE PRACTITIONER

## 2024-04-16 PROCEDURE — 1101F PT FALLS ASSESS-DOCD LE1/YR: CPT | Mod: CPTII,S$GLB,, | Performed by: NURSE PRACTITIONER

## 2024-04-16 PROCEDURE — 1160F RVW MEDS BY RX/DR IN RCRD: CPT | Mod: CPTII,S$GLB,, | Performed by: NURSE PRACTITIONER

## 2024-04-16 PROCEDURE — 1126F AMNT PAIN NOTED NONE PRSNT: CPT | Mod: CPTII,S$GLB,, | Performed by: NURSE PRACTITIONER

## 2024-04-16 PROCEDURE — 1159F MED LIST DOCD IN RCRD: CPT | Mod: CPTII,S$GLB,, | Performed by: NURSE PRACTITIONER

## 2024-04-16 PROCEDURE — 3008F BODY MASS INDEX DOCD: CPT | Mod: CPTII,S$GLB,, | Performed by: NURSE PRACTITIONER

## 2024-04-16 PROCEDURE — 99999 PR PBB SHADOW E&M-EST. PATIENT-LVL IV: CPT | Mod: PBBFAC,,, | Performed by: NURSE PRACTITIONER

## 2024-04-16 PROCEDURE — 3078F DIAST BP <80 MM HG: CPT | Mod: CPTII,S$GLB,, | Performed by: NURSE PRACTITIONER

## 2024-04-16 PROCEDURE — 4010F ACE/ARB THERAPY RXD/TAKEN: CPT | Mod: CPTII,S$GLB,, | Performed by: NURSE PRACTITIONER

## 2024-04-16 NOTE — ASSESSMENT & PLAN NOTE
Update A1C. Continue medications.  I have discussed with the patient about stopping glimepiride 1st once he gets up on the higher dosage of Ozempic to avoid hypoglycemia.  Patient will follow-up sooner if having low blood sugar. We will plan to monitor hemoglobin A1c at designated intervals 3 to 6 months.  I recommend ongoing Education for diabetic diet and exercise protocol.  We will continue to monitor for side effects.    Please be advised of symptoms to monitor for and to notify me immediately if persistent or worsening.  Follow up with Ophthalmology/Optometry and Podiatry at least annually.

## 2024-04-16 NOTE — Clinical Note
Please let patient know that I have ordered some labs to update fasting. Please schedule.  I have completed [facility] form. Please provide copy of my note and list of medications. Please let patient know that form is signed and can be picked up.

## 2024-04-16 NOTE — ASSESSMENT & PLAN NOTE
Continue lisinopril and hydrochlorothiazide. Counseled on importance of hypertension disease course, I recommend ongoing Education for DASH-diet and exercise. Counseled on medication regimen importance of treating high blood pressure. Please be advised of risk of untreated blood pressure as discussed. Please advised of ER precautions were given for symptoms of hypertensive urgency and emergency.

## 2024-04-16 NOTE — TELEPHONE ENCOUNTER
Spoke with pt informed him paperwork for him and wife Lizy are completed. I have them at the  for pick-up. Pt verbalized understanding.

## 2024-04-16 NOTE — PROGRESS NOTES
Assessment/Plan:  Problem List Items Addressed This Visit          Cardiac/Vascular    Hypertension associated with diabetes (Chronic)    Overview     April 2024: Blood pressure is well controlled.  Hypertension Medications               hydroCHLOROthiazide (HYDRODIURIL) 25 MG tablet Take 1 tablet by mouth once daily    lisinopriL (PRINIVIL,ZESTRIL) 5 MG tablet Take 1 tablet (5 mg total) by mouth once daily.    metoprolol succinate (TOPROL-XL) 25 MG 24 hr tablet 30 June 2022:  Patient has been lost to follow-up since December of 2020. Blood pressure remains controlled at this time on current medication regimen.    December 2020:  Blood pressure is well controlled today.  Patient reports compliance with medication regimen.  SEPTEMBER 2020:  Patient with recent elevations in his blood pressures.  Recently went to ER for chest pain but was given medication for reflux that resolve the pain.  Patient has been doing well since then.  CHRONIC. STABLE. BP Reviewed.  Compliant with BP medications. No SE reported.   (-) CP, SOB, palpitations, dizziness, lightheadedness, HA, arm numbness, tingling or weakness, syncope.  Creatinine   Date Value Ref Range Status   11/09/2023 1.0 0.5 - 1.4 mg/dL Final     Initial HPI:  Chronic.  Stable.  Patient taking hydrochlorothiazide 12.5 mg amlodipine 2.5 mg.  Reports compliance.  No side effects reported.  No symptoms of chest pain shortness of breath blurry vision etc.  November 2019:  Patient reports that he did have episode of vertigo and went to the ER but was given meclizine and Tessalon Perles for the cough.  Has not been having issues since then.CHRONIC. STABLE. BP Reviewed.  Compliant with BP medications. No SE reported.     Results for orders placed or performed during the hospital encounter of 03/30/23   EKG 12-lead    Collection Time: 03/30/23  8:15 PM    Narrative    Test Reason : R52,    Vent. Rate : 082 BPM     Atrial Rate : 082 BPM     P-R Int : 236 ms          QRS Dur :  068 ms      QT Int : 364 ms       P-R-T Axes : 064 056 014 degrees     QTc Int : 425 ms    Sinus rhythm with 1st degree A-V block  Anteroseptal infarct (cited on or before 02-NOV-2020)  Abnormal ECG  When compared with ECG of 04-FEB-2022 03:28,  No significant change was found  Confirmed by HERLINDA MOLINA, MARA ([id number]) on 3/31/2023 1:42:40 PM    Referred By: JEANIE   SELF           Confirmed By:MARA PATE MD              Current Assessment & Plan     Continue lisinopril and hydrochlorothiazide. Counseled on importance of hypertension disease course, I recommend ongoing Education for DASH-diet and exercise. Counseled on medication regimen importance of treating high blood pressure. Please be advised of risk of untreated blood pressure as discussed. Please advised of ER precautions were given for symptoms of hypertensive urgency and emergency.          Relevant Orders    Comprehensive Metabolic Panel    Hyperlipidemia associated with type 2 diabetes mellitus (Chronic)    Overview     CHRONIC. STABLE. Lab analysis reviewed.   (-) CP, SOB, abdominal pain, N/V/D, constipation, jaundice, skin changes.  (-) Myalgias  Lab Results   Component Value Date    CHOL 158 11/09/2023    CHOL 167 10/05/2023    CHOL 159 03/20/2023     Lab Results   Component Value Date    HDL 47 11/09/2023    HDL 46 10/05/2023    HDL 50 03/20/2023     Lab Results   Component Value Date    LDLCALC 95.6 11/09/2023    LDLCALC 106 10/05/2023    LDLCALC 96.2 03/20/2023     Lab Results   Component Value Date    TRIG 77 11/09/2023    TRIG 73 10/05/2023    TRIG 64 03/20/2023     Lab Results   Component Value Date    CHOLHDL 29.7 11/09/2023    CHOLHDL 3.6 10/05/2023    CHOLHDL 31.4 03/20/2023     Lab Results   Component Value Date    TOTALCHOLEST 3.4 11/09/2023    TOTALCHOLEST 3.2 03/20/2023    TOTALCHOLEST 3.0 11/22/2019       Lab Results   Component Value Date    ALT 18 11/09/2023    AST 18 11/09/2023    ALKPHOS 42 (L) 11/09/2023    BILITOT 0.9  11/09/2023     ======================================================  The 10-year ASCVD risk score (Johan LARA, et al., 2019) is: 23.8%    Values used to calculate the score:      Age: 63 years      Sex: Male      Is Non- : Yes      Diabetic: Yes      Tobacco smoker: No      Systolic Blood Pressure: 118 mmHg      Is BP treated: Yes      HDL Cholesterol: 47 mg/dL      Total Cholesterol: 158 mg/dL           Current Assessment & Plan     Update fasting lipid panel.  Consider increasing atorvastatin to 80 milligrams to lower LDL less than 70 with his risk factors. Counseled on hyperlipidemia disease course, healthy diet and increased need for exercise. Please be advised of the risk of cardiovascular disease, increase stroke and heart attack risk with uncontrolled/untreated hyperlipidemia. Patient voiced understanding and understood the treatment plan. All questions were answered.          Relevant Orders    Lipid Panel       Endocrine    Type 2 diabetes mellitus with hyperglycemia, without long-term current use of insulin (Chronic)    Overview     April 2024: reviewed labs.  Diabetes Medications               glimepiride (AMARYL) 4 MG tablet Take 0.5 tablets (2 mg total) by mouth daily with breakfast.    metFORMIN (GLUCOPHAGE-XR) 500 MG ER 24hr tablet TAKE 2 TABLETS BY MOUTH TWICE DAILY WITH MEALS     June 2022:  Patient has been lost to follow-up since December of 2020. Today we had a long discussion about new or medications such as Ozempic try to get him off of some of the oral medication and avoid progression to insulin.  Patient and his wife were in agreement with trying this medication.  He denies any history of pancreatitis, thyroid cancer, MEN syndrome.  November 2019:  ReviewedDiabetes Management StatusStatin: TakingACE/ARB: Not taking  MARCH 2020:  Reviewed Diabetes Management StatusStatin: TakingACE/ARB: Taking  SEPTEMBER 2020:  Reviewed Diabetes Management Status Statin: TakingACE/ARB:  "Taking  December 2020:  Reviewed Diabetes Management StatusStatin: TakingACE/ARB: Taking patient admits to diet noncompliance and medication not being taken as prescribed.  Patient reports that he decrease the dosage of metformin on his own.  Diabetes Management Status    Statin: Taking  ACE/ARB: Taking    Screening or Prevention Patient's value Goal Complete/Controlled?   HgA1C Testing and Control   Lab Results   Component Value Date    HGBA1C 6.8 (H) 11/09/2023      Annually/Less than 8% No   Lipid profile : 11/09/2023 Annually No   LDL control Lab Results   Component Value Date    LDLCALC 95.6 11/09/2023    Annually/Less than 100 mg/dl  No   Nephropathy screening Lab Results   Component Value Date    LABMICR 9.0 11/09/2023     Lab Results   Component Value Date    PROTEINUA Negative 05/14/2021     No results found for: "UTPCR"   Annually Yes   Blood pressure BP Readings from Last 1 Encounters:   04/16/24 118/64    Less than 140/90 Yes   Dilated retinal exam : 11/17/2023 Annually Yes   Foot exam   : 04/12/2022 Annually No              Current Assessment & Plan     Update A1C. Continue medications.  I have discussed with the patient about stopping glimepiride 1st once he gets up on the higher dosage of Ozempic to avoid hypoglycemia.  Patient will follow-up sooner if having low blood sugar. We will plan to monitor hemoglobin A1c at designated intervals 3 to 6 months.  I recommend ongoing Education for diabetic diet and exercise protocol.  We will continue to monitor for side effects.    Please be advised of symptoms to monitor for and to notify me immediately if persistent or worsening.  Follow up with Ophthalmology/Optometry and Podiatry at least annually.         Relevant Orders    Hemoglobin A1C       Other    Encounter for long-term (current) use of medications (Chronic)    Overview     Chronic. Stable. Compliant with medications for managed conditions. See medication list. No SE reported. Routine lab analysis " is being monitored. Refills were addressed. Reviewed labs.  Lab Results   Component Value Date    WBC 8.04 03/30/2023    HGB 15.7 03/30/2023    HCT 46.3 03/30/2023    MCV 86 03/30/2023     03/30/2023         Chemistry        Component Value Date/Time     11/09/2023 0712    K 3.4 (L) 11/09/2023 0712    CL 99 11/09/2023 0712    CO2 30 (H) 11/09/2023 0712    BUN 16 11/09/2023 0712    CREATININE 1.0 11/09/2023 0712     11/09/2023 0712        Component Value Date/Time    CALCIUM 10.0 11/09/2023 0712    ALKPHOS 42 (L) 11/09/2023 0712    AST 18 11/09/2023 0712    ALT 18 11/09/2023 0712    BILITOT 0.9 11/09/2023 0712    ESTGFRAFRICA >60.0 06/20/2022 1359    EGFRNONAA >60.0 06/20/2022 1359          Lab Results   Component Value Date    TSH 0.796 08/05/2019    W6NGFPO 7.2 08/05/2019    T3FREE 2.6 08/05/2019       =================================================         Current Assessment & Plan     Update labs. Complete history and physical was completed today.  Complete and thorough medication reconciliation was performed.  Discussed risks and benefits of medications.  Advised patient on orders and health maintenance.  We discussed old records and old labs if available.  Will request any records not available through epic.  Continue current medications listed on your summary sheet.         Relevant Orders    CBC Without Differential    TSH    Administrative encounter - Primary    Overview     He reports that he and his wife are trying to foster a child. 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 He states that he and his wife were never able to have children of their own. They adopted their daughter and son several years ago who are now in their 30s. There are no other individuals or children living in the home. He is a nonsmoker. There is no history of communicable disease, psychiatric care, substance abuse. History of chronic medical conditions have been  reviewed and remains stable.          Current Assessment & Plan     Will update routine labs. I have completed DCFS paperwork.           Follow up if symptoms worsen or fail to improve.  ER precautions for severe or worsening symptoms.     Jana Lawrence, NP  _____________________________________________________________________________________________________________________________________________________    CC: paperwork    HPI: Patient is a 63-year-old male who presents in clinic today accompanied by his wife as an established patient here for paperwork. He reports that he and his wife are trying to foster a child. 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 He states that he and his wife were never able to have children of their own. They adopted their daughter and son several years ago who are now in their 30s. There are no other individuals or children living in the home. He is a nonsmoker. There is no history of communicable disease, psychiatric care, substance abuse. History of chronic medical conditions have been reviewed and remains stable. Further details as stated above.     Past Medical History:  Past Medical History:   Diagnosis Date    Diabetes mellitus, type 2     Hypertension     Spermatocele 9/3/2019     No past surgical history on file.  Review of patient's allergies indicates:   Allergen Reactions    Oxymetazoline Palpitations     Social History     Tobacco Use    Smoking status: Never    Smokeless tobacco: Never   Substance Use Topics    Alcohol use: No    Drug use: No     Family History   Problem Relation Name Age of Onset    Diabetes Mother Svitlana             Heart disease Mother Svitlana     Lung cancer Father      Hypertension Maternal Grandmother Hixie      Current Outpatient Medications on File Prior to Visit   Medication Sig Dispense Refill    aspirin (ECOTRIN) 81 MG EC tablet Take 1 tablet (81 mg total) by mouth once daily.       atorvastatin (LIPITOR) 40 MG tablet Take 1 tablet by mouth once daily 90 tablet 3    azelastine (ASTELIN) 137 mcg (0.1 %) nasal spray 30      famotidine (PEPCID) 20 MG tablet Take 1 tablet (20 mg total) by mouth 2 (two) times daily. 180 tablet 3    fluticasone propionate (FLONASE) 50 mcg/actuation nasal spray 1 spray (50 mcg total) by Each Nostril route once daily. 16 g 0    FREESTYLE FREEDOM LITE kit       FREESTYLE LANCETS 28 gauge lancets       FREESTYLE LITE STRIPS Strp       gabapentin (NEURONTIN) 300 MG capsule Take 1 capsule (300 mg total) by mouth 3 (three) times daily. 270 capsule 3    glimepiride (AMARYL) 4 MG tablet Take 0.5 tablets (2 mg total) by mouth daily with breakfast. 90 tablet 1    hydroCHLOROthiazide (HYDRODIURIL) 25 MG tablet Take 1 tablet by mouth once daily 90 tablet 1    lisinopriL (PRINIVIL,ZESTRIL) 5 MG tablet Take 1 tablet (5 mg total) by mouth once daily. 30 tablet 11    meclizine (ANTIVERT) 12.5 mg tablet TAKE 2 TABLETS BY MOUTH THREE TIMES DAILY AS NEEDED FOR DIZZINESS 30 tablet 12    metFORMIN (GLUCOPHAGE-XR) 500 MG ER 24hr tablet TAKE 2 TABLETS BY MOUTH TWICE DAILY WITH MEALS 360 tablet 1    ondansetron (ZOFRAN-ODT) 4 MG TbDL DISSOLVE 1 TABLET IN MOUTH EVERY 8 HOURS AS NEEDED 30 tablet 0    tamsulosin (FLOMAX) 0.4 mg Cap Take 1 capsule by mouth.      colchicine, gout, (COLCRYS) 0.6 mg tablet Take 0.6 mg by mouth.      metoprolol succinate (TOPROL-XL) 25 MG 24 hr tablet 30      potassium chloride (MICRO-K) 10 MEQ CpSR Take 1 capsule (10 mEq total) by mouth once daily. (Patient not taking: Reported on 4/16/2024) 30 capsule 11    sucralfate (CARAFATE) 1 gram tablet Take 1 tablet (1 g total) by mouth 4 (four) times daily before meals and nightly. (Patient not taking: Reported on 4/16/2024) 90 tablet 1     No current facility-administered medications on file prior to visit.     Review of Systems   Constitutional:  Negative for appetite change, chills, fatigue and fever.   HENT:  Negative  "for congestion, rhinorrhea and sore throat.    Eyes:  Negative for visual disturbance.   Respiratory:  Negative for cough and shortness of breath.    Cardiovascular:  Negative for chest pain, palpitations and leg swelling.   Gastrointestinal:  Negative for abdominal pain, diarrhea and vomiting.   Genitourinary:  Negative for difficulty urinating, dysuria and hematuria.   Musculoskeletal:  Negative for arthralgias and myalgias.   Skin:  Negative for rash and wound.   Neurological:  Negative for dizziness, weakness, numbness and headaches.   Psychiatric/Behavioral:  Negative for behavioral problems. The patient is not nervous/anxious.      Vitals:    04/16/24 0934   BP: 118/64   Pulse: 78   Resp: 18   Temp: 98 °F (36.7 °C)   TempSrc: Oral   SpO2: 96%   Weight: 85.6 kg (188 lb 11.2 oz)   Height: 5' 9" (1.753 m)     Wt Readings from Last 3 Encounters:   04/16/24 85.6 kg (188 lb 11.2 oz)   12/07/23 88.9 kg (195 lb 14.4 oz)   05/09/23 90.6 kg (199 lb 12.8 oz)     Physical Exam  Vitals reviewed.   Constitutional:       General: He is not in acute distress.     Appearance: Normal appearance. He is not ill-appearing.   HENT:      Head: Normocephalic and atraumatic.      Right Ear: External ear normal.      Left Ear: External ear normal.      Nose: Nose normal.   Eyes:      Extraocular Movements: Extraocular movements intact.      Conjunctiva/sclera: Conjunctivae normal.   Cardiovascular:      Rate and Rhythm: Normal rate.      Heart sounds: Normal heart sounds.   Pulmonary:      Effort: Pulmonary effort is normal. No respiratory distress.      Breath sounds: Normal breath sounds.   Abdominal:      General: Abdomen is flat. There is no distension.   Musculoskeletal:         General: Normal range of motion.      Cervical back: Normal range of motion.   Skin:     General: Skin is warm and dry.      Capillary Refill: Capillary refill takes less than 2 seconds.      Coloration: Skin is not pale.      Findings: No rash. "   Neurological:      General: No focal deficit present.      Mental Status: He is alert and oriented to person, place, and time. Mental status is at baseline.      Motor: No weakness.      Gait: Gait normal.   Psychiatric:         Mood and Affect: Mood normal.         Speech: Speech normal. Speech is not rapid and pressured, delayed or slurred.         Behavior: Behavior normal. Behavior is not agitated, slowed, aggressive, withdrawn, hyperactive or combative. Behavior is cooperative.         Thought Content: Thought content normal.         Judgment: Judgment normal.       Health Maintenance   Topic Date Due    Foot Exam  04/12/2023    Hemoglobin A1c  05/09/2024    PROSTATE-SPECIFIC ANTIGEN  11/09/2024    Lipid Panel  11/09/2024    Eye Exam  11/17/2024    Colorectal Cancer Screening  07/25/2027    TETANUS VACCINE  03/01/2033    Hepatitis C Screening  Completed    Shingles Vaccine  Completed

## 2024-04-17 ENCOUNTER — TELEPHONE (OUTPATIENT)
Dept: FAMILY MEDICINE | Facility: CLINIC | Age: 64
End: 2024-04-17
Payer: MEDICARE

## 2024-04-18 ENCOUNTER — PATIENT MESSAGE (OUTPATIENT)
Dept: ADMINISTRATIVE | Facility: HOSPITAL | Age: 64
End: 2024-04-18
Payer: MEDICARE

## 2024-04-22 ENCOUNTER — PATIENT OUTREACH (OUTPATIENT)
Dept: ADMINISTRATIVE | Facility: HOSPITAL | Age: 64
End: 2024-04-22
Payer: MEDICARE

## 2024-04-22 NOTE — LETTER
AUTHORIZATION FOR RELEASE OF   CONFIDENTIAL INFORMATION    [id number]    We are seeing Luis Lozano, date of birth 1960, in the clinic at [facility]. Charles Nash MD is the patient's PCP. Luis Lozano has an outstanding lab/procedure at the time we reviewed his chart. In order to help keep his health information updated, he has authorized us to request the following medical record(s):                                ( X )  DM EYE EXAM  2024           Please fax records to [facility], Callihan, Brian T., MD, [phone]    [facility]  Care Coordination Department  [facility]  [phone]     Patient Name: Luis Lozano  : 1960  Patient Phone #: [phone]

## 2024-04-22 NOTE — PROGRESS NOTES
Replying to Campaign Questionnaire for Overdue HM: DM Eye Exam    Per pt, dm eye exam completed [facility]. TARYN faxed for record.   Reminder Set

## 2024-04-22 NOTE — LETTER
AUTHORIZATION FOR RELEASE OF   CONFIDENTIAL INFORMATION    [id number]    We are seeing Luis Lozano, date of birth 1960, in the clinic at [facility]. Charles Nash MD is the patient's PCP. Luis Lozano has an outstanding lab/procedure at the time we reviewed his chart. In order to help keep his health information updated, he has authorized us to request the following medical record(s):                                ( X )  DM EYE EXAM   [id number]           Please fax records to [facility], Callihan, Brian T., MD, [phone]    [facility]  Care Coordination Department  [facility]  [phone]     Patient Name: Luis Lozano  : 1960  Patient Phone #: [phone]

## 2024-04-29 NOTE — PROGRESS NOTES
Called [facility] to f/u on dm eye exam. Forwarded to , lvm to return call to office.

## 2024-04-30 NOTE — PROGRESS NOTES
Received returned call from [facility]. Per staff, patient did not have a visit in 01/24. Last visit was 11/2023, no dm eye exam completed on that visit. Last dm eye exam record was 02/2023.   Called patient to discuss. No answer, lvm to return call/ portal message also sent.

## 2024-05-13 NOTE — PROGRESS NOTES
Physical Therapy Evaluation    Patient Name Javy Carnes Jr.  MRN: [id number]  Today's Date: 05/13/24  Time Calculation  Start Time: 1455  Stop Time: 1555  Time Calculation (min): 60 min      Assessment:  The pt presents with mild back pain and numbness in B feet that is minimally effected with positioning and activity.  The pt has limited tolerance for standing, but otherwise does not notice a significant change in symptoms with position/activity change.  During the assessment numbness in feet did not change.  The pt has signs and symptoms of SI joint dysfunction, including leg length discrepancy, decreased strength and flexibility R hip/gluteals and tenderness with palpation of the QL muscle.  The pt tolerated flexion exercises well with no pain or increased symptoms, extension was avoided due to spondylolisthesis. The pt is an excellent candidate for skilled therapy to address the above listed limitations and to improve tolerance for functional activities including golf.    Impression:  Skilled PT services will aid in advancing functional status and attaining therapy goals.    Problem List:  -activity limitations  -Functional limitations  -Pain back/ legs  -decreased  ROM  -decreased strength   -decreased flexibility  -posture awareness/ body mechanics  -decreased knowledge of HEP  -leg length discrepancy    Goals:  STG: Pt (I) with initial HEP; By week 3; Goal     LTG Goals: 5/13/24  By week: 6-8    Pain: Decrease back pain / LE symptoms to 2/10 or < with activity ; Goal     Posture: pt to demonstrate postural and body mechanics awareness ; Goal     Gait:  Pt to ambulate (I) without an assistive device on all surfaces, community distances, without limitation from pain, pt to tolerate standing for 60 mins + before numbness in feet requires sitting ; Goal     ROM: Increase lumbar ROM to WNL without limitation from pain. ;  Goal     Strength: Increase core and B LE strength to 5/5 grossly for improved tolerance for  Subjective:       Patient ID: Luis Lozano is a 57 y.o. male.    Chief Complaint: Hypertension    HTN - uncontrolled  HLD - uncontrolled; goal < 70 DM  DM - uncontrolled      Hypertension   Pertinent negatives include no chest pain, palpitations or shortness of breath.     Review of Systems   Constitutional: Negative for appetite change and fever.   HENT: Negative for nosebleeds and trouble swallowing.    Eyes: Negative for discharge and visual disturbance.   Respiratory: Negative for choking and shortness of breath.    Cardiovascular: Negative for chest pain and palpitations.   Gastrointestinal: Negative for abdominal pain, nausea and vomiting.   Musculoskeletal: Negative for arthralgias and joint swelling.   Skin: Negative for rash and wound.   Neurological: Negative for dizziness and syncope.   Psychiatric/Behavioral: Negative for confusion and dysphoric mood.       Objective:      Vitals:    06/12/17 1643   BP: 110/74   Pulse: 62     Physical Exam   Constitutional: He appears well-nourished.   Eyes: Conjunctivae and EOM are normal.   Neck: Normal range of motion.   Cardiovascular: Normal rate and regular rhythm.    Pulmonary/Chest: Effort normal and breath sounds normal.   Musculoskeletal:   Normal ROM bilateral    Neurological: No cranial nerve deficit (grossly intact).   Skin: Skin is warm and dry.   Psychiatric: He has a normal mood and affect.   Alert and orientated   Vitals reviewed.        Assessment:       1. Uncontrolled type 2 diabetes mellitus without complication, without long-term current use of insulin    2. Essential hypertension    3. Dyslipidemia        Plan:       Uncontrolled type 2 diabetes mellitus without complication, without long-term current use of insulin  -     alogliptin 25 mg Tab; Take 25 mg by mouth once daily.  Dispense: 30 tablet; Refill: 6  -     metformin (GLUCOPHAGE-XR) 500 MG 24 hr tablet; Take 1 po qhs x 2 weeks, then take 1 bid for 2 weeks, then 2 qhs and 1 po qam x 2 weeks,  "then 2 bid  Dispense: 120 tablet; Refill: 6  -     pioglitazone (ACTOS) 30 MG tablet; Take 1 tablet (30 mg total) by mouth once daily.  Dispense: 30 tablet; Refill: 6  -     Hemoglobin A1c; Future; Expected date: 09/10/2017  -     Ambulatory Referral to Diabetes Education    Essential hypertension  -     Comprehensive metabolic panel; Future; Expected date: 09/10/2017    Dyslipidemia  -     atorvastatin (LIPITOR) 20 MG tablet; Take 1 tablet (20 mg total) by mouth once daily.  Dispense: 30 tablet; Refill: 6  -     Lipid panel; Future; Expected date: 09/10/2017    1 at time    Medication List with Changes/Refills   New Medications    ALOGLIPTIN 25 MG TAB    Take 25 mg by mouth once daily.    ATORVASTATIN (LIPITOR) 20 MG TABLET    Take 1 tablet (20 mg total) by mouth once daily.    METFORMIN (GLUCOPHAGE-XR) 500 MG 24 HR TABLET    Take 1 po qhs x 2 weeks, then take 1 bid for 2 weeks, then 2 qhs and 1 po qam x 2 weeks, then 2 bid    PIOGLITAZONE (ACTOS) 30 MG TABLET    Take 1 tablet (30 mg total) by mouth once daily.   Current Medications    ASPIRIN (ECOTRIN) 81 MG EC TABLET    Take 81 mg by mouth once daily.    GLIMEPIRIDE (AMARYL) 4 MG TABLET    Take 2 tablets (8 mg total) by mouth daily with breakfast.    HYDROCHLOROTHIAZIDE (HYDRODIURIL) 12.5 MG TAB    Take 1 tablet (12.5 mg total) by mouth once daily.    MULTIVITAMIN (ONE DAILY MULTIVITAMIN) PER TABLET    Take 1 tablet by mouth once daily.   Discontinued Medications    SITAGLIPTAN (JANUVIA) 100 MG TAB    Take 1 tablet (100 mg total) by mouth once daily.             Counseled on regular exercise, maintenance of a healthy weight, balanced diet rich in fruits/vegetables and lean protein, and avoidance of unhealthy habits like smoking and excessive alcohol intake.   Also, counseled on importance of being compliant with medication, health appointments, diet and exercise.     Return in about 3 months (around 9/12/2017).    "This note will not be shared with the " functional activities. ; Goal     Flexibility: Improve flexibility of B LEs to WFL  ; Goal     Leg length: address discrepancy ; Goal    Palpation: Decrease pain with palpation by 50% or > ; Goal    HEP: Pt to be (I) with HEP  ; Goal     Outcome Measures: Oswestry 0/50; Goal     Rehab Potential: good    Plan:  Evaluation, Re-evaluation, Therapeutic Exercise, Therapeutic Activity; Neuromuscular reeducation; Postural Education; Body Mechanics; Home Exercise Program; Manual Techniques; Cold Pack; E-Stim; Ultrasound; IMDN PRN; Aquatics PRN      1-2 x week  until goals met or maximum rehab potential met  Pt is currently scheduled for 4 weeks    Plan of care was designed with input and agreement by the patient        Therapy Diagnoses:   Problem List Items Addressed This Visit             ICD-10-CM    Spondylolisthesis of lumbosacral region M43.17    Relevant Orders    Follow Up In Physical Therapy    Lumbosacral stenosis with neurogenic claudication M48.07    Relevant Orders    Follow Up In Physical Therapy    Radiculopathy of lumbosacral region M54.17    Relevant Orders    Follow Up In Physical Therapy       General:  Reason for visit: back pain and numbness in feet   Referred by: Dr lakshmi Queen MD appt:  neurologist in a few weeks  Preferred Name:  Javy  Script:  eval and treat  Onset Date:  10/1/23    Insurance:  - [employer] 20v/pcy 0 used copay $25, pays 100% OOP $6600  -authorization required: no    Subjective:    Current Episode of Functional Impairment and/or Pain : Low back pain since late 1990s.  Feb 2023 feet started going numbness and some numbness and weakness in the legs.  MRI spondylolisthesis and stenosis.  He is scheduled for his first injection 5/22/24.  Has not had any therapy yet.  He occasionally hurts his back where he is unable to move and his pain level is very high and it takes a few weeks to settle.  Sleeping on R side feels better.    Pain       Pain assessment 0-10  Pain score: 4-5 on  "average, but occasionally higher ; 2-3/10  Pain location: feet ; back    Exacerbating Factors: standing > 30 mins feels like standing on nails, bending over, especially for an extended time, ie yard work. Some pain at night  Relieving Factors: TENS unit, lying on back with knees up,      Functional Limitations:  Functional Limitations: Sleeping and Standing    Home Living Situation: Spouse    Prior Level of Function: (I), no assistive device with ambulation    Patient Goal For Therapy: To decrease numbness in feet, pain and back, and increase tolerance for normal activities.    Occupation: , computer work, sit/ stand desk, lumbar support in chair    Hobbies: golf, yard work,     Precautions:     Current Medical management:     PMHx: heart disease, two stents 2016, HTN, high cholesterol, gall bladder removed, sleep apnea treatment 5/9/24     Medications for pain: n/a     Diagnostic Tests: MRI  IMPRESSION:  Multilevel degenerative changes of the lumbar spine most prominent at  L4-5 and L5-S1.      At L4-5, retrolisthesis with superimposed disc bulge results in  moderate to severe canal stenosis as well as contact of the  traversing right L5 nerve root within the right subarticular recess.      At L5-S1, probable bilateral spondylolysis resulting in 8 mm grade 1  anterolisthesis, which with superimposed degenerative change, results  in severe bilateral foraminal stenosis, left-greater-than-right, with  contact of bilateral exiting L5 nerve roots.  Fall risk: none    Objective:      Pain:           Back pain average 2-3/10, numbness 4-5/10, pain can go up to 10 at times    Posture:           The pt had rounded shoulders, forward head and decreased lumbar lordosis. The pt has decreased postural and body mechanics awareness. Slight lateral shift to the L.     Gait:            The pt is (I) without an assistive device     Stairs:           The pt is (I) with stairs    Balance:           SLS R 10\" + , L " "patient."  " "10 + seconds ;      ROM:           Lumbar flexion 2 \" fingers from floor                        extension n/a due to spondylolisthesis                        SB R 21.5\", L 21 \" fingers from floor                         Rotation R 70 % , L 60 %     Strength:           Abdominals 4/5          Back extensors 4/5          The pt was able to toe and heel walk yes          Hip flexors R/L 5/5          Hip abductors R/L 4/4+          Hip adductors R/L 5-/5-          Hip extensors R/L 4/4+          Quads R/L 5/5          Hams R/L 5/5          DF R/L 5/5          PF R/L 5/5          Great toe extensors R/L 5/5    Flexibility:           Hamstrings R - 30 degrees, L - 30 degrees;          Gluteals R min-mod , L min restriction          Piriformis R max , L max restriction          Hip adductors R n/a , L n/a          Quads R mod, L min-mod restriction          Hip flexors R n/a , L na/ restriction    Transfers:            Sit to stand (I)          Supine to sit (I)    Special tests:          Leg length: in supine R longer ; in long sitting R even longer ; SLR (-) pull in hamstrings ; Slump n/a     Palpation:           Tenderness with palpation in R QL       Ortho:  Outcome Measures:  Other Measures  Oswestry Disablity Index (MYRIAM): 4/50     Treatment:    Evaluation:  40 minutes    **= HEP  NV= Next visit  np= not performed  nb= non-billable  G= group HEP= discharged to HEP    Therapeutic Exercise: 15  SKTC: 5\" x 5 each **  Trunk rotation: x 5 each **  Figure 4 piriformis stretch: 20\" x 3 each **    NMR:         Education: Pt educated on dx, POC, anatomy, posture, ther ex technique and HEP  Preferred learning:  pictures, demonstration.  Demonstrated good understanding.      Access Code: [email or web address]  URL: [email or web address]  Date: 05/13/2024  Prepared by: Renetta Pitts    Exercises  - Hooklying Single Knee to Chest  - 1-2 x daily - 7 x weekly - 10 reps - 3-5 hold  - Supine Lower Trunk Rotation  - 2 x " daily - 7 x weekly - 1-2 sets - 10 reps - 2 hold  - Supine Piriformis Stretch with Foot on Ground  - 1-2 x daily - 7 x weekly - 2-3 sets - 20-30 hold

## 2024-05-18 ENCOUNTER — PATIENT MESSAGE (OUTPATIENT)
Dept: FAMILY MEDICINE | Facility: CLINIC | Age: 64
End: 2024-05-18
Payer: MEDICARE

## 2024-05-18 DIAGNOSIS — E11.51 TYPE 2 DIABETES MELLITUS WITH DIABETIC PERIPHERAL ANGIOPATHY WITHOUT GANGRENE, WITHOUT LONG-TERM CURRENT USE OF INSULIN: ICD-10-CM

## 2024-05-19 NOTE — TELEPHONE ENCOUNTER
Care Due:                  Date            Visit Type   Department     Provider  --------------------------------------------------------------------------------                                EP -                              PRIMARY      [facility] FAMILY  Last Visit: 12-      CARE ([facility])   MEDICINE       Charles Nash  Next Visit: None Scheduled  None         None Found                                                            Last  Test          Frequency    Reason                     Performed    Due Date  --------------------------------------------------------------------------------    HBA1C.......  6 months...  glimepiride, metFORMIN...  11- 05-    [facility] Embedded Care Due Messages. Reference number: [id number].   5/18/2024 7:09:58 PM CDT

## 2024-05-20 ENCOUNTER — TELEPHONE (OUTPATIENT)
Dept: FAMILY MEDICINE | Facility: CLINIC | Age: 64
End: 2024-05-20
Payer: MEDICARE

## 2024-05-20 RX ORDER — ONDANSETRON 4 MG/1
TABLET, ORALLY DISINTEGRATING ORAL
Qty: 30 TABLET | Refills: 0 | Status: SHIPPED | OUTPATIENT
Start: 2024-05-20

## 2024-05-20 NOTE — TELEPHONE ENCOUNTER
Refill Routing Note   Medication(s) are not appropriate for processing by [facility] for the following reason(s):        Outside of protocol    [facility] action(s):  Route        Medication Therapy Plan: FLOS      Appointments  past 12m or future 3m with PCP    Date Provider   Last Visit   12/7/2023 Charles Nash MD   Next Visit   Visit date not found Charles Nash MD   ED visits in past 90 days: 0        Note composed:10:23 AM 05/20/2024

## 2024-05-24 ENCOUNTER — PATIENT MESSAGE (OUTPATIENT)
Dept: FAMILY MEDICINE | Facility: CLINIC | Age: 64
End: 2024-05-24
Payer: MEDICARE

## 2024-05-24 DIAGNOSIS — Z79.899 ENCOUNTER FOR LONG-TERM (CURRENT) USE OF MEDICATIONS: ICD-10-CM

## 2024-05-24 RX ORDER — METFORMIN HYDROCHLORIDE 500 MG/1
1000 TABLET, EXTENDED RELEASE ORAL 2 TIMES DAILY WITH MEALS
Qty: 360 TABLET | Refills: 0 | Status: SHIPPED | OUTPATIENT
Start: 2024-05-24

## 2024-05-24 NOTE — TELEPHONE ENCOUNTER
Refill Routing Note   Medication(s) are not appropriate for processing by [facility] for the following reason(s):        Required labs outdated    [facility] action(s):  Defer               Appointments  past 12m or future 3m with PCP    Date Provider   Last Visit   12/7/2023 Charles Nash MD   Next Visit   Visit date not found Charles Nash MD   ED visits in past 90 days: 0        Note composed:10:22 AM 05/24/2024

## 2024-05-24 NOTE — TELEPHONE ENCOUNTER
No care due was identified.  [facility] Embedded Care Due Messages. Reference number: [id number].   5/24/2024 6:03:28 AM CDT

## 2024-06-07 ENCOUNTER — LAB VISIT (OUTPATIENT)
Dept: LAB | Facility: HOSPITAL | Age: 64
End: 2024-06-07
Attending: FAMILY MEDICINE
Payer: MEDICARE

## 2024-06-07 DIAGNOSIS — Z00.01 ENCOUNTER FOR GENERAL ADULT MEDICAL EXAMINATION WITH ABNORMAL FINDINGS: ICD-10-CM

## 2024-06-07 DIAGNOSIS — Z79.899 ENCOUNTER FOR LONG-TERM (CURRENT) USE OF MEDICATIONS: ICD-10-CM

## 2024-06-07 DIAGNOSIS — Z13.6 ENCOUNTER FOR LIPID SCREENING FOR CARDIOVASCULAR DISEASE: ICD-10-CM

## 2024-06-07 DIAGNOSIS — Z13.220 ENCOUNTER FOR LIPID SCREENING FOR CARDIOVASCULAR DISEASE: ICD-10-CM

## 2024-06-07 LAB
ALBUMIN SERPL BCP-MCNC: 3.8 G/DL (ref 3.5–5.2)
ALP SERPL-CCNC: 45 U/L (ref 55–135)
ALT SERPL W/O P-5'-P-CCNC: 20 U/L (ref 10–44)
ANION GAP SERPL CALC-SCNC: 9 MMOL/L (ref 8–16)
AST SERPL-CCNC: 24 U/L (ref 10–40)
BILIRUB SERPL-MCNC: 0.6 MG/DL (ref 0.1–1)
BUN SERPL-MCNC: 20 MG/DL (ref 8–23)
CALCIUM SERPL-MCNC: 9.9 MG/DL (ref 8.7–10.5)
CHLORIDE SERPL-SCNC: 103 MMOL/L (ref 95–110)
CHOLEST SERPL-MCNC: 144 MG/DL (ref 120–199)
CHOLEST/HDLC SERPL: 3.1 {RATIO} (ref 2–5)
CO2 SERPL-SCNC: 29 MMOL/L (ref 23–29)
CREAT SERPL-MCNC: 1.2 MG/DL (ref 0.5–1.4)
EST. GFR  (NO RACE VARIABLE): >60 ML/MIN/1.73 M^2
ESTIMATED AVG GLUCOSE: 169 MG/DL (ref 68–131)
GLUCOSE SERPL-MCNC: 144 MG/DL (ref 70–110)
HBA1C MFR BLD: 7.5 % (ref 4–5.6)
HDLC SERPL-MCNC: 46 MG/DL (ref 40–75)
HDLC SERPL: 31.9 % (ref 20–50)
LDLC SERPL CALC-MCNC: 84.6 MG/DL (ref 63–159)
NONHDLC SERPL-MCNC: 98 MG/DL
POTASSIUM SERPL-SCNC: 3.5 MMOL/L (ref 3.5–5.1)
PROT SERPL-MCNC: 7.2 G/DL (ref 6–8.4)
SODIUM SERPL-SCNC: 141 MMOL/L (ref 136–145)
TRIGL SERPL-MCNC: 67 MG/DL (ref 30–150)

## 2024-06-07 PROCEDURE — 36415 COLL VENOUS BLD VENIPUNCTURE: CPT | Mod: PO | Performed by: FAMILY MEDICINE

## 2024-06-07 PROCEDURE — 80061 LIPID PANEL: CPT | Performed by: FAMILY MEDICINE

## 2024-06-07 PROCEDURE — 83036 HEMOGLOBIN GLYCOSYLATED A1C: CPT | Performed by: FAMILY MEDICINE

## 2024-06-07 PROCEDURE — 80053 COMPREHEN METABOLIC PANEL: CPT | Performed by: FAMILY MEDICINE

## 2024-06-08 NOTE — PROGRESS NOTES
Make follow-up lab appointment per recommendation below.  Check to see if patient has seen the results through my chart.  If not then,  #CALL THE PATIENT# to discuss results/see if they have questions and document verification of contact. Make F/U appt if needed. [phone]    #My interpretation that was sent to them through [facility]:  Luis, I have reviewed your recent blood work.     Your metabolic panel which shows your glucose, kidney function, electrolytes, and liver function is stable.   Your cholesterol is stable.    Your hemoglobin A1c is elevated from previous.  Follow-up to discuss diabetes management.  This test is gold standard screening test for diabetes.  It is a measures 3 months of your average blood sugar.    =========================  Also please address any outstanding health maintenance that may be due: RSV Vaccine (Age 60+ and Pregnant patients)(1 - 1-dose 60+ series) Never done  Foot Exam due on 04/12/2023  Pneumococcal Vaccines (Age 65+)(3 of 3 - PPSV23 or PCV20) due on 05/06/2023  COVID-19 Vaccine(5 - 2023-24 season) due on 09/01/2023

## 2024-06-18 ENCOUNTER — HOSPITAL ENCOUNTER (OUTPATIENT)
Dept: CARDIOLOGY | Facility: HOSPITAL | Age: 64
Discharge: HOME | End: 2024-06-18
Attending: INTERNAL MEDICINE
Payer: MEDICARE

## 2024-06-18 ENCOUNTER — OFFICE VISIT (OUTPATIENT)
Dept: CARDIOLOGY | Facility: CLINIC | Age: 64
End: 2024-06-18
Payer: MEDICARE

## 2024-06-18 VITALS
SYSTOLIC BLOOD PRESSURE: 110 MMHG | OXYGEN SATURATION: 98 % | BODY MASS INDEX: 27.74 KG/M2 | HEART RATE: 68 BPM | HEIGHT: 69 IN | WEIGHT: 187.31 LBS | DIASTOLIC BLOOD PRESSURE: 62 MMHG

## 2024-06-18 DIAGNOSIS — I15.2 HYPERTENSION ASSOCIATED WITH DIABETES: Chronic | ICD-10-CM

## 2024-06-18 DIAGNOSIS — K21.9 GASTROESOPHAGEAL REFLUX DISEASE WITHOUT ESOPHAGITIS: ICD-10-CM

## 2024-06-18 DIAGNOSIS — E11.59 HYPERTENSION ASSOCIATED WITH DIABETES: Chronic | ICD-10-CM

## 2024-06-18 DIAGNOSIS — E11.51 TYPE 2 DIABETES MELLITUS WITH DIABETIC PERIPHERAL ANGIOPATHY WITHOUT GANGRENE, WITHOUT LONG-TERM CURRENT USE OF INSULIN: ICD-10-CM

## 2024-06-18 DIAGNOSIS — E11.69 HYPERLIPIDEMIA ASSOCIATED WITH TYPE 2 DIABETES MELLITUS: Chronic | ICD-10-CM

## 2024-06-18 DIAGNOSIS — I48.0 PAROXYSMAL ATRIAL FIBRILLATION: Primary | ICD-10-CM

## 2024-06-18 DIAGNOSIS — E78.5 HYPERLIPIDEMIA ASSOCIATED WITH TYPE 2 DIABETES MELLITUS: Chronic | ICD-10-CM

## 2024-06-18 DIAGNOSIS — I48.0 PAROXYSMAL ATRIAL FIBRILLATION: ICD-10-CM

## 2024-06-18 LAB
OHS QRS DURATION: 88 MS
OHS QTC CALCULATION: 396 MS

## 2024-06-18 PROCEDURE — 3051F HG A1C>EQUAL 7.0%<8.0%: CPT | Mod: CPTII,S$GLB,, | Performed by: INTERNAL MEDICINE

## 2024-06-18 PROCEDURE — 1126F AMNT PAIN NOTED NONE PRSNT: CPT | Mod: CPTII,S$GLB,, | Performed by: INTERNAL MEDICINE

## 2024-06-18 PROCEDURE — 3008F BODY MASS INDEX DOCD: CPT | Mod: CPTII,S$GLB,, | Performed by: INTERNAL MEDICINE

## 2024-06-18 PROCEDURE — 3074F SYST BP LT 130 MM HG: CPT | Mod: CPTII,S$GLB,, | Performed by: INTERNAL MEDICINE

## 2024-06-18 PROCEDURE — 93010 ELECTROCARDIOGRAM REPORT: CPT | Mod: ,,, | Performed by: INTERNAL MEDICINE

## 2024-06-18 PROCEDURE — 4010F ACE/ARB THERAPY RXD/TAKEN: CPT | Mod: CPTII,S$GLB,, | Performed by: INTERNAL MEDICINE

## 2024-06-18 PROCEDURE — 1159F MED LIST DOCD IN RCRD: CPT | Mod: CPTII,S$GLB,, | Performed by: INTERNAL MEDICINE

## 2024-06-18 PROCEDURE — 3078F DIAST BP <80 MM HG: CPT | Mod: CPTII,S$GLB,, | Performed by: INTERNAL MEDICINE

## 2024-06-18 PROCEDURE — 99214 OFFICE O/P EST MOD 30 MIN: CPT | Mod: S$GLB,,, | Performed by: INTERNAL MEDICINE

## 2024-06-18 PROCEDURE — 99999 PR PBB SHADOW E&M-EST. PATIENT-LVL V: CPT | Mod: PBBFAC,,, | Performed by: INTERNAL MEDICINE

## 2024-06-18 PROCEDURE — 1160F RVW MEDS BY RX/DR IN RCRD: CPT | Mod: CPTII,S$GLB,, | Performed by: INTERNAL MEDICINE

## 2024-06-18 PROCEDURE — 93005 ELECTROCARDIOGRAM TRACING: CPT | Mod: PO

## 2024-06-18 NOTE — PROGRESS NOTES
Subjective:   Patient ID:  Luis Lozano is a 64 y.o. male who presents for follow-up of Follow-up  Pt with afib transient and apparent pericarditis at [facility] 10-23. Echo nml  Pt states took afrin that caused afib.  Pt states rare palpitations, last episode this morning  EKG today sinus bradycardia  Hypertension  This is a chronic problem. The current episode started more than 1 year ago. The problem has been gradually improving since onset. The problem is controlled. Pertinent negatives include no chest pain, palpitations or shortness of breath. Past treatments include ACE inhibitors and diuretics. The current treatment provides moderate improvement. There are no compliance problems.    Hyperlipidemia  This is a chronic problem. The current episode started more than 1 year ago. The problem is controlled. Pertinent negatives include no chest pain or shortness of breath. Current antihyperlipidemic treatment includes statins. The current treatment provides moderate improvement of lipids. There are no compliance problems.    Chest Pain   This is a new problem. The current episode started 1 to 4 weeks ago. The problem occurs intermittently. The problem has been waxing and waning. The pain is present in the substernal region. The pain is mild. The quality of the pain is described as dull. The pain does not radiate. Pertinent negatives include no dizziness, palpitations or shortness of breath. The treatment provided mild relief.   His past medical history is significant for hyperlipidemia.   Pertinent negatives for past medical history include no muscle weakness.       Review of Systems   Constitutional: Negative. Negative for weight gain.   HENT: Negative.     Eyes: Negative.    Cardiovascular: Negative.  Negative for chest pain, leg swelling and palpitations.   Respiratory: Negative.  Negative for shortness of breath.    Endocrine: Negative.    Hematologic/Lymphatic: Negative.    Skin: Negative.    Musculoskeletal:   Negative for muscle weakness.   Gastrointestinal: Negative.    Genitourinary: Negative.    Neurological: Negative.  Negative for dizziness.   Psychiatric/Behavioral: Negative.     Allergic/Immunologic: Negative.    All other systems reviewed and are negative.    Family History   Problem Relation Name Age of Onset    Diabetes Mother Svitlana             Heart disease Mother Svitlana     Lung cancer Father      Hypertension Maternal Grandmother Chang      Past Medical History:   Diagnosis Date    Diabetes mellitus, type 2     Hypertension     Spermatocele 9/3/2019     Social History     Socioeconomic History    Marital status:    Tobacco Use    Smoking status: Never    Smokeless tobacco: Never   Substance and Sexual Activity    Alcohol use: No    Drug use: No    Sexual activity: Yes     Partners: Female   Social History Narrative    ** Merged History Encounter **          Social Determinants of Health     Financial Resource Strain: Patient Declined (2023)    Overall Financial Resource Strain (CARDIA)     Difficulty of Paying Living Expenses: Patient declined   Food Insecurity: Patient Declined (2023)    Hunger Vital Sign     Worried About Running Out of Food in the Last Year: Patient declined     Ran Out of Food in the Last Year: Patient declined   Transportation Needs: No Transportation Needs (2024)    Received from [facility], [facility]    PRAPARE - Transportation     Lack of Transportation (Medical): No     Lack of Transportation (Non-Medical): No   Recent Concern: Transportation Needs - Unmet Transportation Needs (2023)    PRAPARE - Transportation     Lack of Transportation (Medical): Yes     Lack of Transportation (Non-Medical): No   Physical Activity: Inactive (2023)    Exercise Vital Sign     Days of Exercise per Week: 0 days     Minutes of Exercise per Session: 0 min   Stress: No Stress Concern Present (2023)    [employer] - Occupational  Stress Questionnaire     Feeling of Stress : Not at all   Housing Stability: Unknown (12/6/2023)    Housing Stability Vital Sign     Unable to Pay for Housing in the Last Year: Patient refused     Number of Places Lived in the Last Year: 1     Unstable Housing in the Last Year: No     Current Outpatient Medications on File Prior to Visit   Medication Sig Dispense Refill    aspirin (ECOTRIN) 81 MG EC tablet Take 1 tablet (81 mg total) by mouth once daily.      atorvastatin (LIPITOR) 40 MG tablet Take 1 tablet by mouth once daily 90 tablet 3    azelastine (ASTELIN) 137 mcg (0.1 %) nasal spray 30      cetirizine (ZYRTEC) 10 MG tablet Take 1 tablet (10 mg total) by mouth once daily. 30 tablet 0    famotidine (PEPCID) 20 MG tablet Take 1 tablet (20 mg total) by mouth 2 (two) times daily. 180 tablet 3    fluticasone propionate (FLONASE) 50 mcg/actuation nasal spray 2 sprays (100 mcg total) by Each Nostril route once daily. 11.1 mL 0    FREESTYLE FREEDOM LITE kit       FREESTYLE LANCETS 28 gauge lancets       FREESTYLE LITE STRIPS Strp       gabapentin (NEURONTIN) 300 MG capsule Take 1 capsule (300 mg total) by mouth 3 (three) times daily. 270 capsule 3    glimepiride (AMARYL) 4 MG tablet Take 0.5 tablets (2 mg total) by mouth daily with breakfast. 90 tablet 1    hydroCHLOROthiazide (HYDRODIURIL) 25 MG tablet Take 1 tablet by mouth once daily 90 tablet 1    lisinopriL (PRINIVIL,ZESTRIL) 5 MG tablet Take 1 tablet (5 mg total) by mouth once daily. 30 tablet 11    meclizine (ANTIVERT) 12.5 mg tablet TAKE 2 TABLETS BY MOUTH THREE TIMES DAILY AS NEEDED FOR DIZZINESS 30 tablet 12    metFORMIN (GLUCOPHAGE-XR) 500 MG ER 24hr tablet TAKE 2 TABLETS BY MOUTH TWICE DAILY WITH MEALS 360 tablet 0    ondansetron (ZOFRAN-ODT) 4 MG TbDL DISSOLVE 1 TABLET IN MOUTH EVERY 8 HOURS AS NEEDED 30 tablet 0    tamsulosin (FLOMAX) 0.4 mg Cap Take 1 capsule by mouth.      potassium chloride (MICRO-K) 10 MEQ CpSR Take 1 capsule (10 mEq total) by mouth  once daily. (Patient not taking: Reported on 4/16/2024) 30 capsule 11    sucralfate (CARAFATE) 1 gram tablet Take 1 tablet (1 g total) by mouth 4 (four) times daily before meals and nightly. (Patient not taking: Reported on 4/16/2024) 90 tablet 1     No current facility-administered medications on file prior to visit.     Review of patient's allergies indicates:   Allergen Reactions    Oxymetazoline Palpitations       Objective:     Physical Exam  Vitals and nursing note reviewed.   Constitutional:       Appearance: He is well-developed.   HENT:      Head: Normocephalic and atraumatic.   Eyes:      Conjunctiva/sclera: Conjunctivae normal.      Pupils: Pupils are equal, round, and reactive to light.   Cardiovascular:      Rate and Rhythm: Normal rate and regular rhythm.      Pulses: Intact distal pulses.      Heart sounds: Normal heart sounds.   Pulmonary:      Effort: Pulmonary effort is normal.      Breath sounds: Normal breath sounds.   Abdominal:      General: Bowel sounds are normal.      Palpations: Abdomen is soft.   Musculoskeletal:      Cervical back: Normal range of motion and neck supple.   Skin:     General: Skin is warm and dry.   Neurological:      Mental Status: He is alert and oriented to person, place, and time.         Assessment:     1. Paroxysmal atrial fibrillation    2. Type 2 diabetes mellitus with diabetic peripheral angiopathy without gangrene, without long-term current use of insulin    3. Hyperlipidemia associated with type 2 diabetes mellitus    4. Hypertension associated with diabetes    5. Gastroesophageal reflux disease without esophagitis        Plan:     Paroxysmal atrial fibrillation    Type 2 diabetes mellitus with diabetic peripheral angiopathy without gangrene, without long-term current use of insulin    Hyperlipidemia associated with type 2 diabetes mellitus    Hypertension associated with diabetes    Gastroesophageal reflux disease without esophagitis        Continue lisinopril,  hctz-htn  Continue statin-hlp  Cardiac monitor

## 2024-06-20 ENCOUNTER — PATIENT MESSAGE (OUTPATIENT)
Dept: FAMILY MEDICINE | Facility: CLINIC | Age: 64
End: 2024-06-20
Payer: MEDICARE

## 2024-06-20 RX ORDER — BLOOD-GLUCOSE METER
KIT MISCELLANEOUS
Qty: 90 EACH | Refills: 4 | Status: SHIPPED | OUTPATIENT
Start: 2024-06-20

## 2024-06-20 RX ORDER — LANCETS 28 GAUGE
EACH MISCELLANEOUS
Qty: 90 EACH | Refills: 4 | Status: SHIPPED | OUTPATIENT
Start: 2024-06-20

## 2024-06-20 RX ORDER — BLOOD-GLUCOSE METER
KIT MISCELLANEOUS
Qty: 1 EACH | Refills: 0 | Status: SHIPPED | OUTPATIENT
Start: 2024-06-20

## 2024-06-20 NOTE — TELEPHONE ENCOUNTER
No care due was identified.  [facility] Embedded Care Due Messages. Reference number: [id number].   6/20/2024 10:02:43 AM CDT

## 2024-06-24 ENCOUNTER — TELEPHONE (OUTPATIENT)
Dept: FAMILY MEDICINE | Facility: CLINIC | Age: 64
End: 2024-06-24
Payer: MEDICARE

## 2024-06-24 NOTE — TELEPHONE ENCOUNTER
----- Message from Roslyn Chavez sent at 6/24/2024  8:40 AM CDT -----  Contact: Chanda/Opal  Type:  Pharmacy Calling to Clarify an RX    Name of Caller:Chanda  Pharmacy Name: [facility]  Prescription Name:FREESTYLE FREEDOM LITE kit, FREESTYLE LANCETS 28 gauge lancets, and FREESTYLE LITE STRIPS Strp  What do they need to clarify?: need directions on all three  Best Call Back Number:[phone]  Additional Information:   Pt has been unable to tolerate PO intake x5 days. He has immediate emesis after eating without associated nausea or abdominal discomfort. He also feels the urge for BM but reports constipation with scant diarrhea. Reports abdominal distension.  -AFVSS. Wbc 2.93k (chronicaly low)  -Lipase, LFT's unremarkable but with electrolyte abnormalities/EDUAR and isolated elevation of T bili  -CT unremarkable, except Mild diffuse wall thickening of the rectum could represent proctitis noted  -No previous EGD, most recent c-scope reviewed  -Bariatric clears, advance diet as tolerated  -Supportive care  -GI consulted, appreciate recommendations   - Plan for EGD pending improvement in electrolytes and once patient has had bowel movement   - Intravascular resuscitation/support with IVFs

## 2024-06-24 NOTE — TELEPHONE ENCOUNTER
Patient is not on insulin.  He can check his blood sugar once daily as needed.  Diabetes Medications               glimepiride (AMARYL) 4 MG tablet Take 0.5 tablets (2 mg total) by mouth daily with breakfast.    metFORMIN (GLUCOPHAGE-XR) 500 MG ER 24hr tablet TAKE 2 TABLETS BY MOUTH TWICE DAILY WITH MEALS

## 2024-06-25 ENCOUNTER — TELEPHONE (OUTPATIENT)
Dept: CARDIOLOGY | Facility: CLINIC | Age: 64
End: 2024-06-25
Payer: MEDICARE

## 2024-06-25 ENCOUNTER — HOSPITAL ENCOUNTER (OUTPATIENT)
Dept: CARDIOLOGY | Facility: HOSPITAL | Age: 64
Discharge: HOME | End: 2024-06-25
Attending: INTERNAL MEDICINE
Payer: MEDICARE

## 2024-06-25 DIAGNOSIS — I48.0 PAROXYSMAL ATRIAL FIBRILLATION: ICD-10-CM

## 2024-06-25 NOTE — TELEPHONE ENCOUNTER
Reached out to patient to verify [employer] monitoring contacted him to resolve vital phone issue. Patient stated that they had and were they were expediting him a new vital phone as they attempted to fix current phone but were unsuccessful. Nurse instructed patient to call back should he have any further issues. Nurse received email from [employer] confirming phone being expedited and apologized for any inconvenience.

## 2024-07-08 ENCOUNTER — TELEPHONE (OUTPATIENT)
Dept: CARDIOLOGY | Facility: CLINIC | Age: 64
End: 2024-07-08
Payer: MEDICARE

## 2024-07-08 NOTE — TELEPHONE ENCOUNTER
Spoke with pt and discussed monitor results. Pt verbalized understanding and will call back with any further questions or concerns.     ----- Message from Jovani Alba MD sent at 7/7/2024  5:57 AM CDT -----  Cardiac monitor shows no afib

## 2024-07-25 DIAGNOSIS — E11.51 TYPE 2 DIABETES MELLITUS WITH DIABETIC PERIPHERAL ANGIOPATHY WITHOUT GANGRENE, WITHOUT LONG-TERM CURRENT USE OF INSULIN: ICD-10-CM

## 2024-07-25 RX ORDER — ONDANSETRON 4 MG/1
TABLET, ORALLY DISINTEGRATING ORAL
Qty: 30 TABLET | Refills: 0 | Status: SHIPPED | OUTPATIENT
Start: 2024-07-25

## 2024-08-13 ENCOUNTER — TELEPHONE (OUTPATIENT)
Dept: PODIATRY | Facility: CLINIC | Age: 64
End: 2024-08-13
Payer: MEDICARE

## 2024-08-13 NOTE — TELEPHONE ENCOUNTER
Called for the pt and his wife Lizy to get them scheduled, no answer and both VM are full.               ----- Message from Laurie Lopez sent at 8/13/2024  8:52 AM CDT -----  Type : Patient Call      Who Called :  Patient Wife       Does the patient know what this is regarding?: Pt is wanting to establish care ; pt is wanting to schedule an apt for the [facility] ; please advise        Would the patient rather a call back or a response via [facility]? Call        Best Call Back Number:  [phone]          Additional Information:

## 2024-09-11 ENCOUNTER — PATIENT MESSAGE (OUTPATIENT)
Dept: FAMILY MEDICINE | Facility: CLINIC | Age: 64
End: 2024-09-11
Payer: MEDICARE

## 2024-09-24 DIAGNOSIS — E11.51 TYPE 2 DIABETES MELLITUS WITH DIABETIC PERIPHERAL ANGIOPATHY WITHOUT GANGRENE, WITHOUT LONG-TERM CURRENT USE OF INSULIN: ICD-10-CM

## 2024-09-25 RX ORDER — ONDANSETRON 4 MG/1
TABLET, ORALLY DISINTEGRATING ORAL
Qty: 30 TABLET | Refills: 0 | Status: SHIPPED | OUTPATIENT
Start: 2024-09-25

## 2024-09-25 NOTE — TELEPHONE ENCOUNTER
Refill Routing Note   Medication(s) are not appropriate for processing by [facility] for the following reason(s):        Outside of protocol    [facility] action(s):  Route               Appointments  past 12m or future 3m with PCP    Date Provider   Last Visit   Visit date not found Charles Nash MD   Next Visit   Visit date not found Charles Nash MD   ED visits in past 90 days: 0        Note composed:7:25 AM 09/25/2024

## 2024-10-10 ENCOUNTER — E-VISIT (OUTPATIENT)
Dept: FAMILY MEDICINE | Facility: CLINIC | Age: 64
End: 2024-10-10
Payer: MEDICARE

## 2024-10-10 DIAGNOSIS — H92.09 OTALGIA, UNSPECIFIED LATERALITY: Primary | ICD-10-CM

## 2024-10-10 RX ORDER — NEOMYCIN SULFATE, POLYMYXIN B SULFATE AND HYDROCORTISONE 10; 3.5; 1 MG/ML; MG/ML; [USP'U]/ML
3 SUSPENSION/ DROPS AURICULAR (OTIC) 4 TIMES DAILY
Qty: 10 ML | Refills: 0 | Status: SHIPPED | OUTPATIENT
Start: 2024-10-10

## 2024-10-10 NOTE — PROGRESS NOTES
Patient ID: Luis Lozano is a 64 y.o. male.    Chief Complaint: Otalgia    The patient initiated a request through [employer] on 10/10/2024 for evaluation and management with a chief complaint of Otalgia     I evaluated the questionnaire submission on 10/10/2024  .    [facility] Evisit [facility]-Cough And Cold    10/10/2024 12:02 PM CDT - Filed by Patient   What do you need help with? Other Concern   Do you agree to participate in an E-Visit? Yes   If you have any of the following symptoms, please present to your local emergency room or call 911:  I acknowledge   What is the main issue you would like addressed today? Blood mixed with earwax   Please describe your symptoms Slight intermittent ear pain   Where is your problem located? Left ear.   How severe are your symptoms? Mild   Have you had these symptoms before? No   How long have you been having these symptoms? For a few days   Please list any medications or treatments you have used for your condition and indicate if it was effective or not. N/A   What makes this feel better? Goes away on its own   What makes this feel worse? N/A   Are these symptoms related to a condition that you currently have? I am not sure   What is the condition? Light blood stain on Q-tip   When were you last seen for this condition?    Please describe any probable cause for these symptoms Shower a daily, possible watee in ear.   Provide any additional information you feel is important. N/A   Please attach any relevant images or files    Are you able to take your vital signs? Yes   Systolic Blood Pressure: 117   Diastolic Blood Pressure: 73   Weight: 180   Height: 5.9   Pulse: 75   Temperature: 99.4   Respiration rate: 98   Pulse Oxygen: 74         Encounter Diagnosis   Name Primary?    Otalgia, unspecified laterality Yes        No orders of the defined types were placed in this encounter.     Medications Ordered This Encounter   Medications    neomycin-polymyxin-hydrocortisone  (CORTISPORIN) 3.5-10,000-1 mg/mL-unit/mL-% otic suspension     Sig: Place 3 drops into the left ear 4 (four) times daily.     Dispense:  10 mL     Refill:  0        Follow up if symptoms worsen or fail to improve.      E-Visit Time Tracking:    Day 1 Time (in minutes): 5    Total Time (in minutes): 5

## 2024-10-10 NOTE — PATIENT INSTRUCTIONS
Follow up if symptoms worsen or fail to improve.     Dear patient,   As a result of recent federal legislation (The Federal Cures Act), you may receive lab or pathology results from your visit in your [facility] account before your physician is able to contact you. Your physician or their representative will relay the results to you with their recommendations at their soonest availability.     If no improvement in symptoms or symptoms worsen, please be advised to call MD, follow-up at clinic and/or go to ER if becomes severe.    Charles Nash M.D.        We Offer TELEHEALTH & Same Day Appointments!   Book your Telehealth appointment with me through my nurse or   Clinic appointments on [employer]!    [address] [place] [address]    Office: [phone]   FAX: [phone]    Check out my Facebook Page and Follow Me at: [email or web address]    Check out my website at [employer] by clicking on: [email or web address]    To Schedule appointments online, go to [email or web address]: [email or web address]

## 2024-10-31 LAB
LEFT EYE DM RETINOPATHY: NEGATIVE
RIGHT EYE DM RETINOPATHY: NEGATIVE

## 2024-11-01 ENCOUNTER — PATIENT OUTREACH (OUTPATIENT)
Dept: ADMINISTRATIVE | Facility: HOSPITAL | Age: 64
End: 2024-11-01
Payer: MEDICARE

## 2024-11-09 DIAGNOSIS — E11.69 HYPERLIPIDEMIA ASSOCIATED WITH TYPE 2 DIABETES MELLITUS: ICD-10-CM

## 2024-11-09 DIAGNOSIS — Z79.899 ENCOUNTER FOR LONG-TERM (CURRENT) USE OF MEDICATIONS: ICD-10-CM

## 2024-11-09 DIAGNOSIS — E11.59 HYPERTENSION ASSOCIATED WITH DIABETES: ICD-10-CM

## 2024-11-09 DIAGNOSIS — I15.2 HYPERTENSION ASSOCIATED WITH DIABETES: ICD-10-CM

## 2024-11-09 DIAGNOSIS — E78.5 HYPERLIPIDEMIA ASSOCIATED WITH TYPE 2 DIABETES MELLITUS: ICD-10-CM

## 2024-11-11 RX ORDER — ATORVASTATIN CALCIUM 40 MG/1
40 TABLET, FILM COATED ORAL
Qty: 90 TABLET | Refills: 0 | Status: SHIPPED | OUTPATIENT
Start: 2024-11-11

## 2024-11-11 RX ORDER — HYDROCHLOROTHIAZIDE 25 MG/1
25 TABLET ORAL
Qty: 90 TABLET | Refills: 0 | Status: SHIPPED | OUTPATIENT
Start: 2024-11-11

## 2024-11-13 DIAGNOSIS — E11.9 TYPE 2 DIABETES MELLITUS WITHOUT COMPLICATION: ICD-10-CM

## 2024-11-15 DIAGNOSIS — Z79.899 ENCOUNTER FOR LONG-TERM (CURRENT) USE OF MEDICATIONS: ICD-10-CM

## 2024-11-18 RX ORDER — LISINOPRIL 5 MG/1
5 TABLET ORAL
Qty: 90 TABLET | Refills: 0 | Status: SHIPPED | OUTPATIENT
Start: 2024-11-18

## 2024-12-22 RX ORDER — LISINOPRIL 5 MG/1
5 TABLET ORAL DAILY
Qty: 30 TABLET | Refills: 11 | Status: SHIPPED | OUTPATIENT
Start: 2024-12-22

## 2025-01-08 ENCOUNTER — PATIENT OUTREACH (OUTPATIENT)
Dept: ADMINISTRATIVE | Facility: HOSPITAL | Age: 65
End: 2025-01-08
Payer: MEDICARE

## 2025-01-08 NOTE — PROGRESS NOTES
DM LABS: per chart review pt is overdue for HA1C, & MICROALBUMIN, I linked micro to lab appt 2.6.25.

## 2025-02-04 ENCOUNTER — PATIENT OUTREACH (OUTPATIENT)
Dept: ADMINISTRATIVE | Facility: HOSPITAL | Age: 65
End: 2025-02-04
Payer: MEDICARE

## 2025-02-04 DIAGNOSIS — I15.2 HYPERTENSION ASSOCIATED WITH DIABETES: ICD-10-CM

## 2025-02-04 DIAGNOSIS — E78.5 HYPERLIPIDEMIA ASSOCIATED WITH TYPE 2 DIABETES MELLITUS: ICD-10-CM

## 2025-02-04 DIAGNOSIS — Z79.899 ENCOUNTER FOR LONG-TERM (CURRENT) USE OF MEDICATIONS: ICD-10-CM

## 2025-02-04 DIAGNOSIS — E11.59 HYPERTENSION ASSOCIATED WITH DIABETES: ICD-10-CM

## 2025-02-04 DIAGNOSIS — E11.69 HYPERLIPIDEMIA ASSOCIATED WITH TYPE 2 DIABETES MELLITUS: ICD-10-CM

## 2025-02-04 RX ORDER — HYDROCHLOROTHIAZIDE 25 MG/1
25 TABLET ORAL
Qty: 90 TABLET | Refills: 0 | Status: SHIPPED | OUTPATIENT
Start: 2025-02-04

## 2025-02-04 RX ORDER — ATORVASTATIN CALCIUM 40 MG/1
40 TABLET, FILM COATED ORAL
Qty: 90 TABLET | Refills: 0 | Status: SHIPPED | OUTPATIENT
Start: 2025-02-04

## 2025-02-04 NOTE — TELEPHONE ENCOUNTER
Care Due:                  Date            Visit Type   Department     Provider  --------------------------------------------------------------------------------                                EP -                              PRIMARY      [facility]  Last Visit: 12-      CARE ([facility])   MEDICINE       Charles Nash  Next Visit: None Scheduled  None         None Found                                                            Last  Test          Frequency    Reason                     Performed    Due Date  --------------------------------------------------------------------------------    Office Visit  15 months..  atorvastatin, famotidine,   12- 03-                             glimepiride, metFORMIN...    HBA1C.......  6 months...  glimepiride, metFORMIN...  06- 12-    Health [facility] Embedded Care Due Messages. Reference number: [id number].   2/04/2025 6:04:21 AM CST

## 2025-02-04 NOTE — TELEPHONE ENCOUNTER
No care due was identified.  [facility] Embedded Care Due Messages. Reference number: [id number].   2/04/2025 9:36:07 AM CST

## 2025-02-04 NOTE — PROGRESS NOTES
[facility] OUTREACH: per chart review pt is overdue for HA1C, & MICROALBUMIN  already linked to lab appt 2.6.25.

## 2025-02-04 NOTE — TELEPHONE ENCOUNTER
Refill Routing Note   Medication(s) are not appropriate for processing by [facility] for the following reason(s):        ED/Hospital Visit since last OV with provider    [facility] action(s):  Defer               Appointments  past 12m or future 3m with PCP    Date Provider   Last Visit   12/7/2023 Charles Nash MD   Next Visit   Visit date not found Charles Nash MD   ED visits in past 90 days: 0        Note composed:9:39 AM 02/04/2025

## 2025-02-06 DIAGNOSIS — E11.51 TYPE 2 DIABETES MELLITUS WITH DIABETIC PERIPHERAL ANGIOPATHY WITHOUT GANGRENE, WITHOUT LONG-TERM CURRENT USE OF INSULIN: ICD-10-CM

## 2025-02-06 RX ORDER — ONDANSETRON 4 MG/1
TABLET, ORALLY DISINTEGRATING ORAL
Qty: 30 TABLET | Refills: 0 | Status: SHIPPED | OUTPATIENT
Start: 2025-02-06

## 2025-02-06 NOTE — TELEPHONE ENCOUNTER
Refill Routing Note   Medication(s) are not appropriate for processing by [facility] for the following reason(s):        Outside of protocol    [facility] action(s):  Route               Appointments  past 12m or future 3m with PCP    Date Provider   Last Visit   12/7/2023 Charles Nash MD   Next Visit   Visit date not found Charles Nash MD   ED visits in past 90 days: 0        Note composed:11:43 AM 02/06/2025

## 2025-02-17 DIAGNOSIS — Z79.899 ENCOUNTER FOR LONG-TERM (CURRENT) USE OF MEDICATIONS: ICD-10-CM

## 2025-02-17 RX ORDER — LISINOPRIL 5 MG/1
5 TABLET ORAL DAILY
Qty: 90 TABLET | Refills: 0 | Status: SHIPPED | OUTPATIENT
Start: 2025-02-17

## 2025-03-21 ENCOUNTER — PATIENT MESSAGE (OUTPATIENT)
Dept: CARDIOLOGY | Facility: CLINIC | Age: 65
End: 2025-03-21
Payer: MEDICARE

## 2025-04-15 ENCOUNTER — OFFICE VISIT (OUTPATIENT)
Dept: CARDIOLOGY | Facility: CLINIC | Age: 65
End: 2025-04-15
Payer: MEDICARE

## 2025-04-15 VITALS
HEART RATE: 53 BPM | OXYGEN SATURATION: 98 % | DIASTOLIC BLOOD PRESSURE: 68 MMHG | WEIGHT: 185.5 LBS | HEIGHT: 69 IN | SYSTOLIC BLOOD PRESSURE: 116 MMHG | BODY MASS INDEX: 27.47 KG/M2

## 2025-04-15 DIAGNOSIS — I48.0 PAROXYSMAL ATRIAL FIBRILLATION: ICD-10-CM

## 2025-04-15 DIAGNOSIS — E11.59 HYPERTENSION ASSOCIATED WITH DIABETES: Primary | Chronic | ICD-10-CM

## 2025-04-15 DIAGNOSIS — E78.5 HYPERLIPIDEMIA ASSOCIATED WITH TYPE 2 DIABETES MELLITUS: Chronic | ICD-10-CM

## 2025-04-15 DIAGNOSIS — E11.69 HYPERLIPIDEMIA ASSOCIATED WITH TYPE 2 DIABETES MELLITUS: Chronic | ICD-10-CM

## 2025-04-15 DIAGNOSIS — I15.2 HYPERTENSION ASSOCIATED WITH DIABETES: Primary | Chronic | ICD-10-CM

## 2025-04-15 PROCEDURE — 3078F DIAST BP <80 MM HG: CPT | Mod: CPTII,S$GLB,, | Performed by: INTERNAL MEDICINE

## 2025-04-15 PROCEDURE — 4010F ACE/ARB THERAPY RXD/TAKEN: CPT | Mod: CPTII,S$GLB,, | Performed by: INTERNAL MEDICINE

## 2025-04-15 PROCEDURE — 3288F FALL RISK ASSESSMENT DOCD: CPT | Mod: CPTII,S$GLB,, | Performed by: INTERNAL MEDICINE

## 2025-04-15 PROCEDURE — 1101F PT FALLS ASSESS-DOCD LE1/YR: CPT | Mod: CPTII,S$GLB,, | Performed by: INTERNAL MEDICINE

## 2025-04-15 PROCEDURE — 1160F RVW MEDS BY RX/DR IN RCRD: CPT | Mod: CPTII,S$GLB,, | Performed by: INTERNAL MEDICINE

## 2025-04-15 PROCEDURE — 3008F BODY MASS INDEX DOCD: CPT | Mod: CPTII,S$GLB,, | Performed by: INTERNAL MEDICINE

## 2025-04-15 PROCEDURE — 3074F SYST BP LT 130 MM HG: CPT | Mod: CPTII,S$GLB,, | Performed by: INTERNAL MEDICINE

## 2025-04-15 PROCEDURE — 99214 OFFICE O/P EST MOD 30 MIN: CPT | Mod: S$GLB,,, | Performed by: INTERNAL MEDICINE

## 2025-04-15 PROCEDURE — 1126F AMNT PAIN NOTED NONE PRSNT: CPT | Mod: CPTII,S$GLB,, | Performed by: INTERNAL MEDICINE

## 2025-04-15 PROCEDURE — 1159F MED LIST DOCD IN RCRD: CPT | Mod: CPTII,S$GLB,, | Performed by: INTERNAL MEDICINE

## 2025-04-15 PROCEDURE — 99999 PR PBB SHADOW E&M-EST. PATIENT-LVL III: CPT | Mod: PBBFAC,,, | Performed by: INTERNAL MEDICINE

## 2025-04-15 NOTE — PROGRESS NOTES
Subjective:   Patient ID:  Luis Lozano is a 64 y.o. male who presents for follow-up of No chief complaint on file.  6-24  Pt with afib transient and apparent pericarditis at [facility] 10-23. Echo nml  Pt states took afrin that caused afib.  Pt states rare palpitations, last episode this morning  EKG today sinus bradycardia      Today  Cardiac monitor nml  Patient denies CP, angina or anginal equivalent.    Hypertension  This is a chronic problem. The current episode started more than 1 year ago. The problem has been gradually improving since onset. The problem is controlled. Pertinent negatives include no chest pain, palpitations or shortness of breath. Past treatments include ACE inhibitors and diuretics. The current treatment provides moderate improvement. There are no compliance problems.    Hyperlipidemia  This is a chronic problem. The current episode started more than 1 year ago. The problem is controlled. Pertinent negatives include no chest pain or shortness of breath. Current antihyperlipidemic treatment includes statins. The current treatment provides moderate improvement of lipids. There are no compliance problems.    Chest Pain   This is a new problem. The current episode started 1 to 4 weeks ago. The problem occurs intermittently. The problem has been waxing and waning. The pain is present in the substernal region. The pain is mild. The quality of the pain is described as dull. The pain does not radiate. Pertinent negatives include no dizziness, palpitations or shortness of breath. The treatment provided mild relief.   His past medical history is significant for hyperlipidemia.   Pertinent negatives for past medical history include no muscle weakness.       Review of Systems   Constitutional: Negative. Negative for weight gain.   HENT: Negative.     Eyes: Negative.    Cardiovascular: Negative.  Negative for chest pain, leg swelling and palpitations.   Respiratory: Negative.  Negative for shortness of  breath.    Endocrine: Negative.    Hematologic/Lymphatic: Negative.    Skin: Negative.    Musculoskeletal:  Negative for muscle weakness.   Gastrointestinal: Negative.    Genitourinary: Negative.    Neurological: Negative.  Negative for dizziness.   Psychiatric/Behavioral: Negative.     Allergic/Immunologic: Negative.    All other systems reviewed and are negative.    Family History   Problem Relation Name Age of Onset    Diabetes Mother Svitlana             Heart disease Mother Svitlana     Lung cancer Father      Hypertension Maternal Grandmother Chang      Past Medical History:   Diagnosis Date    Diabetes mellitus, type 2     Hypertension     Spermatocele 9/3/2019     Social History[1]  Medications Ordered Prior to Encounter[2]  Review of patient's allergies indicates:   Allergen Reactions    Oxymetazoline Palpitations       Objective:     Physical Exam  Vitals and nursing note reviewed.   Constitutional:       Appearance: He is well-developed.   HENT:      Head: Normocephalic and atraumatic.   Eyes:      Conjunctiva/sclera: Conjunctivae normal.      Pupils: Pupils are equal, round, and reactive to light.   Cardiovascular:      Rate and Rhythm: Normal rate and regular rhythm.      Pulses: Intact distal pulses.      Heart sounds: Normal heart sounds.   Pulmonary:      Effort: Pulmonary effort is normal.      Breath sounds: Normal breath sounds.   Abdominal:      General: Bowel sounds are normal.      Palpations: Abdomen is soft.   Musculoskeletal:      Cervical back: Normal range of motion and neck supple.   Skin:     General: Skin is warm and dry.   Neurological:      Mental Status: He is alert and oriented to person, place, and time.         Assessment:     1. Hypertension associated with diabetes    2. Paroxysmal atrial fibrillation    3. Hyperlipidemia associated with type 2 diabetes mellitus        Plan:     Hypertension associated with diabetes    Paroxysmal atrial fibrillation    Hyperlipidemia  associated with type 2 diabetes mellitus    Continue lisinopril, hctz-htn  Continue statin-hlp           [1]   Social History  Socioeconomic History    Marital status:    Tobacco Use    Smoking status: Never    Smokeless tobacco: Never   Substance and Sexual Activity    Alcohol use: No    Drug use: No    Sexual activity: Yes     Partners: Female   Social History Narrative    ** Merged History Encounter **          Social Drivers of Health     Financial Resource Strain: Low Risk  (4/14/2025)    Overall Financial Resource Strain (CARDIA)     Difficulty of Paying Living Expenses: Not very hard   Food Insecurity: Food Insecurity Present (4/14/2025)    Hunger Vital Sign     Worried About Running Out of Food in the Last Year: Sometimes true     Ran Out of Food in the Last Year: Sometimes true   Transportation Needs: No Transportation Needs (4/14/2025)    PRAPARE - Transportation     Lack of Transportation (Medical): No     Lack of Transportation (Non-Medical): No   Physical Activity: Inactive (4/14/2025)    Exercise Vital Sign     Days of Exercise per Week: 0 days     Minutes of Exercise per Session: 0 min   Stress: No Stress Concern Present (4/14/2025)    [employer] [place] [employer] - Occupational Stress Questionnaire     Feeling of Stress : Not at all   Housing Stability: Low Risk  (4/14/2025)    Housing Stability Vital Sign     Unable to Pay for Housing in the Last Year: No     Number of Times Moved in the Last Year: 0     Homeless in the Last Year: No   [2]   Current Outpatient Medications on File Prior to Visit   Medication Sig Dispense Refill    aspirin (ECOTRIN) 81 MG EC tablet Take 1 tablet (81 mg total) by mouth once daily.      atorvastatin (LIPITOR) 40 MG tablet Take 1 tablet by mouth once daily 90 tablet 0    cetirizine (ZYRTEC) 10 MG tablet Take 1 tablet (10 mg total) by mouth once daily. 30 tablet 0    famotidine (PEPCID) 20 MG tablet Take 1 tablet (20 mg total) by mouth 2 (two) times  daily. 180 tablet 3    fluticasone propionate (FLONASE) 50 mcg/actuation nasal spray 2 sprays (100 mcg total) by Each Nostril route once daily. 11.1 mL 0    FREESTYLE FREEDOM LITE kit Use as instructedUse as instructed 1 each 0    FREESTYLE LANCETS 28 gauge lancets As directed 90 each 4    FREESTYLE LITE STRIPS Strp Uses direct 90 each 4    gabapentin (NEURONTIN) 300 MG capsule Take 1 capsule (300 mg total) by mouth 3 (three) times daily. 270 capsule 3    glimepiride (AMARYL) 4 MG tablet Take 0.5 tablets (2 mg total) by mouth daily with breakfast. 90 tablet 1    hydroCHLOROthiazide (HYDRODIURIL) 25 MG tablet Take 1 tablet by mouth once daily 90 tablet 0    lisinopriL (PRINIVIL,ZESTRIL) 5 MG tablet Take 1 tablet (5 mg total) by mouth once daily. 90 tablet 0    meclizine (ANTIVERT) 12.5 mg tablet TAKE 2 TABLETS BY MOUTH THREE TIMES DAILY AS NEEDED FOR DIZZINESS 30 tablet 12    metFORMIN (GLUCOPHAGE-XR) 500 MG ER 24hr tablet TAKE 2 TABLETS BY MOUTH TWICE DAILY WITH MEALS 360 tablet 0    neomycin-polymyxin-hydrocortisone (CORTISPORIN) 3.5-10,000-1 mg/mL-unit/mL-% otic suspension Place 3 drops into the left ear 4 (four) times daily. 10 mL 0    ondansetron (ZOFRAN-ODT) 4 MG TbDL DISSOLVE 1 TABLET IN MOUTH EVERY 8 HOURS AS NEEDED 30 tablet 0    tamsulosin (FLOMAX) 0.4 mg Cap Take 1 capsule by mouth.      lisinopriL (PRINIVIL,ZESTRIL) 5 MG tablet Take 1 tablet (5 mg total) by mouth once daily. 30 tablet 11     No current facility-administered medications on file prior to visit.

## 2025-04-22 ENCOUNTER — PATIENT OUTREACH (OUTPATIENT)
Dept: ADMINISTRATIVE | Facility: HOSPITAL | Age: 65
End: 2025-04-22
Payer: MEDICARE

## 2025-04-22 NOTE — PROGRESS NOTES
DM LAB REPORT: Linked CBC, TSH, Microalbumin urine, CMP, Lipid panel, & HA1c to upcoming LAB appt.

## 2025-04-28 ENCOUNTER — LAB VISIT (OUTPATIENT)
Dept: LAB | Facility: HOSPITAL | Age: 65
End: 2025-04-28
Attending: FAMILY MEDICINE
Payer: MEDICARE

## 2025-04-28 ENCOUNTER — RESULTS FOLLOW-UP (OUTPATIENT)
Dept: FAMILY MEDICINE | Facility: CLINIC | Age: 65
End: 2025-04-28

## 2025-04-28 DIAGNOSIS — E11.59 HYPERTENSION ASSOCIATED WITH DIABETES: Chronic | ICD-10-CM

## 2025-04-28 DIAGNOSIS — E78.5 HYPERLIPIDEMIA ASSOCIATED WITH TYPE 2 DIABETES MELLITUS: Chronic | ICD-10-CM

## 2025-04-28 DIAGNOSIS — I15.2 HYPERTENSION ASSOCIATED WITH DIABETES: Chronic | ICD-10-CM

## 2025-04-28 DIAGNOSIS — Z79.899 ENCOUNTER FOR LONG-TERM (CURRENT) USE OF MEDICATIONS: Chronic | ICD-10-CM

## 2025-04-28 DIAGNOSIS — E11.9 TYPE 2 DIABETES MELLITUS WITHOUT COMPLICATION: ICD-10-CM

## 2025-04-28 DIAGNOSIS — E11.69 HYPERLIPIDEMIA ASSOCIATED WITH TYPE 2 DIABETES MELLITUS: Chronic | ICD-10-CM

## 2025-04-28 DIAGNOSIS — E11.65 TYPE 2 DIABETES MELLITUS WITH HYPERGLYCEMIA, WITHOUT LONG-TERM CURRENT USE OF INSULIN: Chronic | ICD-10-CM

## 2025-04-28 LAB
ALBUMIN SERPL BCP-MCNC: 3.7 G/DL (ref 3.5–5.2)
ALBUMIN/CREAT UR: 4.6 UG/MG
ALP SERPL-CCNC: 46 UNIT/L (ref 40–150)
ALT SERPL W/O P-5'-P-CCNC: 17 UNIT/L (ref 10–44)
ANION GAP (OHS): 10 MMOL/L (ref 8–16)
AST SERPL-CCNC: 16 UNIT/L (ref 11–45)
BILIRUB SERPL-MCNC: 0.9 MG/DL (ref 0.1–1)
BUN SERPL-MCNC: 19 MG/DL (ref 8–23)
CALCIUM SERPL-MCNC: 9.2 MG/DL (ref 8.7–10.5)
CHLORIDE SERPL-SCNC: 100 MMOL/L (ref 95–110)
CHOLEST SERPL-MCNC: 156 MG/DL (ref 120–199)
CHOLEST/HDLC SERPL: 2.7 {RATIO} (ref 2–5)
CO2 SERPL-SCNC: 27 MMOL/L (ref 23–29)
CREAT SERPL-MCNC: 1 MG/DL (ref 0.5–1.4)
CREAT UR-MCNC: 197 MG/DL (ref 23–375)
EAG (OHS): 186 MG/DL (ref 68–131)
ERYTHROCYTE [DISTWIDTH] IN BLOOD BY AUTOMATED COUNT: 13.2 % (ref 11.5–14.5)
GFR SERPLBLD CREATININE-BSD FMLA CKD-EPI: >60 ML/MIN/1.73/M2
GLUCOSE SERPL-MCNC: 171 MG/DL (ref 70–110)
HBA1C MFR BLD: 8.1 % (ref 4–5.6)
HCT VFR BLD AUTO: 47.4 % (ref 40–54)
HDLC SERPL-MCNC: 57 MG/DL (ref 40–75)
HDLC SERPL: 36.5 % (ref 20–50)
HGB BLD-MCNC: 15.5 GM/DL (ref 14–18)
LDLC SERPL CALC-MCNC: 88.2 MG/DL (ref 63–159)
MCH RBC QN AUTO: 29.3 PG (ref 27–31)
MCHC RBC AUTO-ENTMCNC: 32.7 G/DL (ref 32–36)
MCV RBC AUTO: 90 FL (ref 82–98)
MICROALBUMIN UR-MCNC: 9 UG/ML (ref ?–5000)
NONHDLC SERPL-MCNC: 99 MG/DL
PLATELET # BLD AUTO: 254 K/UL (ref 150–450)
PMV BLD AUTO: 10.7 FL (ref 9.2–12.9)
POTASSIUM SERPL-SCNC: 3.7 MMOL/L (ref 3.5–5.1)
PROT SERPL-MCNC: 7.1 GM/DL (ref 6–8.4)
RBC # BLD AUTO: 5.29 M/UL (ref 4.6–6.2)
SODIUM SERPL-SCNC: 137 MMOL/L (ref 136–145)
TRIGL SERPL-MCNC: 54 MG/DL (ref 30–150)
TSH SERPL-ACNC: 0.69 UIU/ML (ref 0.4–4)
WBC # BLD AUTO: 5.83 K/UL (ref 3.9–12.7)

## 2025-04-28 PROCEDURE — 83036 HEMOGLOBIN GLYCOSYLATED A1C: CPT

## 2025-04-28 PROCEDURE — 80053 COMPREHEN METABOLIC PANEL: CPT

## 2025-04-28 PROCEDURE — 84443 ASSAY THYROID STIM HORMONE: CPT

## 2025-04-28 PROCEDURE — 80061 LIPID PANEL: CPT

## 2025-04-28 PROCEDURE — 36415 COLL VENOUS BLD VENIPUNCTURE: CPT | Mod: PO

## 2025-04-28 PROCEDURE — 82570 ASSAY OF URINE CREATININE: CPT

## 2025-04-28 PROCEDURE — 85027 COMPLETE CBC AUTOMATED: CPT

## 2025-04-29 ENCOUNTER — RESULTS FOLLOW-UP (OUTPATIENT)
Dept: FAMILY MEDICINE | Facility: CLINIC | Age: 65
End: 2025-04-29
Payer: MEDICARE

## 2025-05-06 ENCOUNTER — PATIENT MESSAGE (OUTPATIENT)
Dept: FAMILY MEDICINE | Facility: CLINIC | Age: 65
End: 2025-05-06
Payer: MEDICARE

## 2025-05-06 DIAGNOSIS — E78.5 HYPERLIPIDEMIA ASSOCIATED WITH TYPE 2 DIABETES MELLITUS: ICD-10-CM

## 2025-05-06 DIAGNOSIS — Z79.899 ENCOUNTER FOR LONG-TERM (CURRENT) USE OF MEDICATIONS: ICD-10-CM

## 2025-05-06 DIAGNOSIS — I15.2 HYPERTENSION ASSOCIATED WITH DIABETES: ICD-10-CM

## 2025-05-06 DIAGNOSIS — E11.59 HYPERTENSION ASSOCIATED WITH DIABETES: ICD-10-CM

## 2025-05-06 DIAGNOSIS — E11.69 HYPERLIPIDEMIA ASSOCIATED WITH TYPE 2 DIABETES MELLITUS: ICD-10-CM

## 2025-05-06 RX ORDER — HYDROCHLOROTHIAZIDE 25 MG/1
25 TABLET ORAL
Qty: 90 TABLET | Refills: 1 | Status: SHIPPED | OUTPATIENT
Start: 2025-05-06

## 2025-05-06 RX ORDER — ATORVASTATIN CALCIUM 40 MG/1
40 TABLET, FILM COATED ORAL
Qty: 90 TABLET | Refills: 1 | Status: SHIPPED | OUTPATIENT
Start: 2025-05-06

## 2025-05-06 NOTE — TELEPHONE ENCOUNTER
No care due was identified.  Health [facility] Embedded Care Due Messages. Reference number: [id number].   5/06/2025 9:58:00 AM CDT

## 2025-05-06 NOTE — TELEPHONE ENCOUNTER
No care due was identified.  Health [facility] Embedded Care Due Messages. Reference number: [id number].   5/06/2025 6:06:27 AM CDT

## 2025-05-06 NOTE — TELEPHONE ENCOUNTER
Refill Routing Note   Medication(s) are not appropriate for processing by [facility] for the following reason(s):        Patient not seen by provider within 15 months  ED/Hospital Visit since last OV with provider    [facility] action(s):  Defer             Appointments  past 12m or future 3m with PCP    Date Provider   Last Visit   10/10/2024 Charles Nash MD   Next Visit   5/30/2025 Charles Nash MD   ED visits in past 90 days: 0        Note composed:9:57 AM 05/06/2025

## 2025-05-06 NOTE — TELEPHONE ENCOUNTER
Refill Routing Note   Medication(s) are not appropriate for processing by [facility] for the following reason(s):        Patient not seen by provider within 15 months    [facility] action(s):  Defer             Appointments  past 12m or future 3m with PCP    Date Provider   Last Visit   10/10/2024 Charles Nsah MD   Next Visit   5/30/2025 Charles Nash MD   ED visits in past 90 days: 0        Note composed:10:10 AM 05/06/2025

## 2025-05-23 DIAGNOSIS — E11.51 TYPE 2 DIABETES MELLITUS WITH DIABETIC PERIPHERAL ANGIOPATHY WITHOUT GANGRENE, WITHOUT LONG-TERM CURRENT USE OF INSULIN: ICD-10-CM

## 2025-05-23 RX ORDER — ONDANSETRON 4 MG/1
TABLET, ORALLY DISINTEGRATING ORAL
Qty: 30 TABLET | Refills: 0 | Status: SHIPPED | OUTPATIENT
Start: 2025-05-23

## 2025-05-23 NOTE — TELEPHONE ENCOUNTER
No care due was identified.  [facility] Embedded Care Due Messages. Reference number: [id number].   5/23/2025 8:56:29 AM CDT

## 2025-05-23 NOTE — TELEPHONE ENCOUNTER
Refill Routing Note   Medication(s) are not appropriate for processing by [facility] for the following reason(s):        Outside of protocol    [facility] action(s):  Route               Appointments  past 12m or future 3m with PCP    Date Provider   Last Visit   10/10/2024 Charles Nash MD   Next Visit   5/30/2025 Charles Nash MD   ED visits in past 90 days: 0        Note composed:10:44 AM 05/23/2025

## 2025-05-30 ENCOUNTER — OFFICE VISIT (OUTPATIENT)
Dept: FAMILY MEDICINE | Facility: CLINIC | Age: 65
End: 2025-05-30
Payer: MEDICARE

## 2025-05-30 VITALS
WEIGHT: 185 LBS | BODY MASS INDEX: 27.4 KG/M2 | SYSTOLIC BLOOD PRESSURE: 120 MMHG | DIASTOLIC BLOOD PRESSURE: 78 MMHG | OXYGEN SATURATION: 97 % | HEIGHT: 69 IN | HEART RATE: 87 BPM

## 2025-05-30 DIAGNOSIS — Z56.6 STRESS AT WORK: ICD-10-CM

## 2025-05-30 DIAGNOSIS — Z79.899 ENCOUNTER FOR LONG-TERM (CURRENT) USE OF MEDICATIONS: ICD-10-CM

## 2025-05-30 DIAGNOSIS — E11.59 HYPERTENSION ASSOCIATED WITH DIABETES: ICD-10-CM

## 2025-05-30 DIAGNOSIS — E11.69 HYPERLIPIDEMIA ASSOCIATED WITH TYPE 2 DIABETES MELLITUS: ICD-10-CM

## 2025-05-30 DIAGNOSIS — E11.51 TYPE 2 DIABETES MELLITUS WITH DIABETIC PERIPHERAL ANGIOPATHY WITHOUT GANGRENE, WITHOUT LONG-TERM CURRENT USE OF INSULIN: Primary | ICD-10-CM

## 2025-05-30 DIAGNOSIS — K21.9 GASTROESOPHAGEAL REFLUX DISEASE WITHOUT ESOPHAGITIS: ICD-10-CM

## 2025-05-30 DIAGNOSIS — G89.29 CHRONIC FOOT PAIN, UNSPECIFIED LATERALITY: ICD-10-CM

## 2025-05-30 DIAGNOSIS — E78.5 HYPERLIPIDEMIA ASSOCIATED WITH TYPE 2 DIABETES MELLITUS: ICD-10-CM

## 2025-05-30 DIAGNOSIS — M79.673 CHRONIC FOOT PAIN, UNSPECIFIED LATERALITY: ICD-10-CM

## 2025-05-30 DIAGNOSIS — I15.2 HYPERTENSION ASSOCIATED WITH DIABETES: ICD-10-CM

## 2025-05-30 DIAGNOSIS — R35.0 BENIGN PROSTATIC HYPERPLASIA WITH URINARY FREQUENCY: ICD-10-CM

## 2025-05-30 DIAGNOSIS — N40.1 BENIGN PROSTATIC HYPERPLASIA WITH URINARY FREQUENCY: ICD-10-CM

## 2025-05-30 DIAGNOSIS — E11.65 TYPE 2 DIABETES MELLITUS WITH HYPERGLYCEMIA, WITHOUT LONG-TERM CURRENT USE OF INSULIN: Chronic | ICD-10-CM

## 2025-05-30 DIAGNOSIS — G89.29 OTHER CHRONIC PAIN: ICD-10-CM

## 2025-05-30 PROBLEM — M19.90 OSTEOARTHRITIS: Status: ACTIVE | Noted: 2025-05-30

## 2025-05-30 PROBLEM — H26.9 UNSPECIFIED CATARACT: Status: ACTIVE | Noted: 2025-05-30

## 2025-05-30 PROBLEM — N40.0 BENIGN PROSTATIC HYPERPLASIA: Chronic | Status: ACTIVE | Noted: 2021-02-11

## 2025-05-30 PROCEDURE — 99999 PR PBB SHADOW E&M-EST. PATIENT-LVL V: CPT | Mod: PBBFAC,,, | Performed by: FAMILY MEDICINE

## 2025-05-30 RX ORDER — GLIMEPIRIDE 4 MG/1
2 TABLET ORAL
Qty: 90 TABLET | Refills: 1 | Status: CANCELLED | OUTPATIENT
Start: 2025-05-30

## 2025-05-30 RX ORDER — HYDROCHLOROTHIAZIDE 25 MG/1
25 TABLET ORAL DAILY
Qty: 90 TABLET | Refills: 4 | Status: SHIPPED | OUTPATIENT
Start: 2025-05-30

## 2025-05-30 RX ORDER — METFORMIN HYDROCHLORIDE 500 MG/1
1000 TABLET, EXTENDED RELEASE ORAL 2 TIMES DAILY WITH MEALS
Qty: 360 TABLET | Refills: 4 | Status: SHIPPED | OUTPATIENT
Start: 2025-05-30

## 2025-05-30 RX ORDER — FAMOTIDINE 20 MG/1
20 TABLET, FILM COATED ORAL 2 TIMES DAILY
Qty: 180 TABLET | Refills: 4 | Status: SHIPPED | OUTPATIENT
Start: 2025-05-30

## 2025-05-30 RX ORDER — TAMSULOSIN HYDROCHLORIDE 0.4 MG/1
1 CAPSULE ORAL DAILY
Qty: 90 CAPSULE | Refills: 4 | Status: SHIPPED | OUTPATIENT
Start: 2025-05-30

## 2025-05-30 RX ORDER — LISINOPRIL 5 MG/1
5 TABLET ORAL DAILY
Qty: 90 TABLET | Refills: 4 | Status: SHIPPED | OUTPATIENT
Start: 2025-05-30

## 2025-05-30 RX ORDER — ATORVASTATIN CALCIUM 40 MG/1
40 TABLET, FILM COATED ORAL DAILY
Qty: 90 TABLET | Refills: 4 | Status: SHIPPED | OUTPATIENT
Start: 2025-05-30

## 2025-05-30 SDOH — SOCIAL DETERMINANTS OF HEALTH (SDOH): OTHER PHYSICAL AND MENTAL STRAIN RELATED TO WORK: Z56.6
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
## 2025-05-30 NOTE — PROGRESS NOTES
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    empagliflozin (JARDIANCE) 10 mg, Oral, Daily    famotidine (PEPCID) 20 mg, Oral, 2 times daily    fluticasone propionate (FLONASE) 100 mcg, Each Nostril, Daily    FREESTYLE FREEDOM LITE kit Use as instructedUse as instructed    FREESTYLE LANCETS 28 gauge lancets As directed    FREESTYLE LITE STRIPS Strp Uses direct    gabapentin (NEURONTIN) 300 mg, Oral, 3 times daily    hydroCHLOROthiazide (HYDRODIURIL) 25 mg, Oral, Daily    lisinopriL (PRINIVIL,ZESTRIL) 5 mg, Oral, Daily    meclizine (ANTIVERT) 12.5 mg tablet TAKE 2 TABLETS BY MOUTH THREE TIMES DAILY AS NEEDED FOR DIZZINESS    metFORMIN (GLUCOPHAGE-XR) 1,000 mg, Oral, 2 times daily with meals    neomycin-polymyxin-hydrocortisone (CORTISPORIN) 3.5-10,000-1 mg/mL-unit/mL-% otic suspension 3 drops, Left Ear, 4 times daily    ondansetron (ZOFRAN-ODT) 4 MG TbDL DISSOLVE 1 TABLET IN MOUTH EVERY 8 HOURS AS NEEDED    tamsulosin (FLOMAX) 0.4 mg, Oral, Daily      I have reviewed the PMH, social history, FamilyHx, surgical history, allergies and medications documented / confirmed by the patient at the time of this visit.  Review of Systems   Constitutional:  Negative for chills, fatigue, fever and unexpected weight change.   HENT:  Negative for congestion, ear pain and sore throat.   "  Eyes:  Negative for redness and visual disturbance.   Respiratory:  Negative for cough and shortness of breath.    Cardiovascular:  Negative for chest pain and palpitations.   Gastrointestinal:  Negative for nausea and vomiting.   Endocrine: Negative for cold intolerance and heat intolerance.   Genitourinary:  Negative for difficulty urinating and hematuria.   Musculoskeletal:  Positive for arthralgias. Negative for myalgias.   Skin:  Negative for rash and wound.   Allergic/Immunologic: Negative for environmental allergies and food allergies.   Neurological:  Negative for weakness and headaches.   Hematological:  Negative for adenopathy. Does not bruise/bleed easily.   Psychiatric/Behavioral:  Negative for sleep disturbance. The patient is nervous/anxious.      Objective:   /78   Pulse 87   Ht 5' 9" (1.753 m)   Wt 83.9 kg (185 lb)   SpO2 97%   BMI 27.32 kg/m²   Physical Exam  Vitals and nursing note reviewed.   Constitutional:       General: He is not in acute distress.     Appearance: He is well-developed. He is not diaphoretic.   HENT:      Head: Normocephalic and atraumatic.      Right Ear: External ear normal.      Left Ear: External ear normal.      Nose: Nose normal. No rhinorrhea.   Eyes:      Extraocular Movements: Extraocular movements intact.      Pupils: Pupils are equal, round, and reactive to light.   Cardiovascular:      Rate and Rhythm: Normal rate.      Pulses: Normal pulses.   Pulmonary:      Effort: Pulmonary effort is normal. No respiratory distress.      Breath sounds: Normal breath sounds.   Abdominal:      General: Bowel sounds are normal.      Palpations: Abdomen is soft.   Musculoskeletal:         General: Normal range of motion.      Cervical back: Normal range of motion and neck supple.   Skin:     General: Skin is warm and dry.      Capillary Refill: Capillary refill takes less than 2 seconds.      Findings: No rash.   Neurological:      General: No focal deficit present.      " Mental Status: He is alert and oriented to person, place, and time.      Cranial Nerves: No cranial nerve deficit.      Motor: No weakness.      Gait: Gait normal.   Psychiatric:         Attention and Perception: He is attentive. He does not perceive auditory or visual hallucinations.         Mood and Affect: Mood normal. Mood is not anxious or depressed. Affect is not labile, blunt, angry or inappropriate.         Speech: He is communicative. Speech is not rapid and pressured, delayed, slurred or tangential.         Behavior: Behavior normal. Behavior is not agitated, slowed, aggressive, withdrawn, hyperactive or combative.         Thought Content: Thought content normal. Thought content is not paranoid or delusional. Thought content does not include homicidal or suicidal ideation. Thought content does not include homicidal or suicidal plan.         Cognition and Memory: Memory is not impaired.         Judgment: Judgment normal. Judgment is not impulsive or inappropriate.       Physical Exam  Respiratory: Clear to auscultation, no wheezing, rales or rhonchi    Results  Labs   - Blood counts: Normal   - Kidney function: Normal   - Liver function: Normal   - Electrolytes (sodium, potassium, chloride): Normal   - Cholesterol: Stable   - A1c: 8.1   - Thyroid function: Normal    Assessment:     1. Type 2 diabetes mellitus with diabetic peripheral angiopathy without gangrene, without long-term current use of insulin    2. Gastroesophageal reflux disease without esophagitis    3. Encounter for long-term (current) use of medications    4. Hyperlipidemia associated with type 2 diabetes mellitus    5. Hypertension associated with diabetes    6. Benign prostatic hyperplasia with urinary frequency    7. Stress at work    8. Other chronic pain    9. Type 2 diabetes mellitus with hyperglycemia, without long-term current use of insulin    10. Chronic foot pain, unspecified laterality      MDM:   Moderate to high medical  complexity. Moderate to high risk.   Total time: 45 minutes.  This includes total time spent on the encounter, which includes face to face time and non-face to face time preparing to see the patient (eg, review of previous medical records, tests), Obtaining and/or reviewing separately obtained history, documenting clinical information in the electronic or other health record, independently interpreting results (not separately reported)/communicating results to the patient/family/caregiver, and/or care coordination (not separately reported).    I have Reviewed and summarized old records.  I have performed thorough medication reconciliation today and discussed risk and benefits of medications.  I have reviewed labs and discussed with patient.  All questions were answered.  I am requesting old records and will review them once they are available. Urology  Visit today included increased complexity associated with the care of the episodic problem see above assessment addressed and managing the longitudinal care of the patient due to the serious and/or complex managed problem(s) see above.    I have signed for the following orders AND/OR meds.  Orders Placed This Encounter   Procedures    GABAPENTIN LEVEL     Standing Status:   Future     Expected Date:   5/30/2025     Expiration Date:   8/28/2026     Send normal result to authorizing provider's In Basket if patient is active on MyChart::   Yes    Ambulatory referral/consult to Podiatry     Standing Status:   Future     Expected Date:   6/6/2025     Expiration Date:   6/30/2026     Referral Priority:   Routine     Referral Type:   Consultation     Referral Reason:   Specialty Services Required     Requested Specialty:   Podiatry     Number of Visits Requested:   1    Ambulatory referral/consult to Psychiatry     Standing Status:   Future     Expected Date:   6/6/2025     Expiration Date:   6/30/2026     Referral Priority:   Routine     Referral Type:   Psychiatric      Referral Reason:   Specialty Services Required     Requested Specialty:   Psychiatry     Number of Visits Requested:   1    Ambulatory referral/consult to Social Work     Standing Status:   Future     Expected Date:   6/6/2025     Expiration Date:   6/30/2026     Referral Priority:   Routine     Referral Type:   Consultation     Referral Reason:   Specialty Services Required     Requested Specialty:   Licensed Clinical      Number of Visits Requested:   1     Medications Ordered This Encounter   Medications    atorvastatin (LIPITOR) 40 MG tablet     Sig: Take 1 tablet (40 mg total) by mouth once daily.     Dispense:  90 tablet     Refill:  4    empagliflozin (JARDIANCE) 10 mg tablet     Sig: Take 1 tablet (10 mg total) by mouth once daily.     Dispense:  90 tablet     Refill:  1    famotidine (PEPCID) 20 MG tablet     Sig: Take 1 tablet (20 mg total) by mouth 2 (two) times daily.     Dispense:  180 tablet     Refill:  4    hydroCHLOROthiazide (HYDRODIURIL) 25 MG tablet     Sig: Take 1 tablet (25 mg total) by mouth once daily.     Dispense:  90 tablet     Refill:  4     .    lisinopriL (PRINIVIL,ZESTRIL) 5 MG tablet     Sig: Take 1 tablet (5 mg total) by mouth once daily.     Dispense:  90 tablet     Refill:  4     .    metFORMIN (GLUCOPHAGE-XR) 500 MG ER 24hr tablet     Sig: Take 2 tablets (1,000 mg total) by mouth 2 (two) times daily with meals.     Dispense:  360 tablet     Refill:  4    tamsulosin (FLOMAX) 0.4 mg Cap     Sig: Take 1 capsule (0.4 mg total) by mouth once daily.     Dispense:  90 capsule     Refill:  4        Follow up in about 6 months (around 11/30/2025), or if symptoms worsen or fail to improve.  Future Appointments       Date Provider Specialty Appt Notes    6/30/2025 Yehuda Dorado DPM Podiatry Type 2 diabetes mellitus with diabetic peripheral angiopathy without gangrene, w    9/2/2025  Lab labs    10/30/2025 Jovani Alba MD Cardiology 6 month f/u          If no  improvement in symptoms or symptoms worsen, advised to call/follow-up at clinic or go to ER. Patient voiced understanding and all questions/concerns were addressed.   DISCLAIMER: This note was compiled by using a speech recognition dictation system and therefore please be aware that typographical / speech recognition errors can and do occur.  Please contact me if you see any errors specifically.  Consent was obtained for PETER recording system prior to the visit.    This note was generated with the assistance of ambient listening technology. Verbal consent was obtained by the patient and accompanying visitor(s) for the recording of patient appointment to facilitate this note. I attest to having reviewed and edited the generated note for accuracy, though some syntax or spelling errors may persist. Please contact the author of this note for any clarification.    Charles Nash M.D.       Office: [phone] [address] [place] [address]  FAX: [phone]

## 2025-05-30 NOTE — PATIENT INSTRUCTIONS
Follow up in about 6 months (around 11/30/2025), or if symptoms worsen or fail to improve.   Psychiatry, Psychotherapy, Licensed Counselors    [facility]  [place]: [phone]  [place]: [phone]  [place]: [phone]  [place]: [phone]    [facility]   [address], [place] [address]  [place] [phone]    [facility]  [address]  [place] [address]  [phone]  Fang Graff, Ph.D., M.P  Sanford Holbrook, Ph.D., M.P      [facility] [employer] [facility]  [address]  [place] [address]  Shalonda Shrestha LPC  [phone]  Cely Collado LPC, [place]  [phone]        Norma England LPC, LMFT  [address]  [address]  [place]  [place] [address] [phone]        Nena Man MA, LPC  Phone: [phone]  Fax: [phone]  4yrs- Adult  Areas of Service: Depression, anxiety, medication management, substance abuse, anger management, coping with grief, communication skills, parenting skills, and addiction.  Accepts private insurance and cash payments        Dr. Saurav Dominguez- [facility] [place]  [facility]  [address]   [address]  [place], [place] [id number] [phone]  ACCEPTED INSURANCE:  [facility]  Aetna  [facility]  [facility]  [place] [facility]  BU[employer]  [employer]  Humana  [place]  [facility]  Value Options        Jim Braxton LPC  [facility] counselor  [facility]  [address], [place] [id number] [phone]  AVG Cost per Session: $80 - $130  ACCEPTED INSURANCE:  BCBS  Value Options  (License to [facility])          [facility]  [address] [address]  Accept Medicaid  Other [facility] [place] LAMB.HDAGOBERTO LAMB.HDAGOBERTO  [facility] COrtegaHDAGOBERTO  [facility]  Edilia C.H.C  [facility] C.H.C  [facility] C.H.C  [facility] C.H.C  [facility]..C    Our skilled providers specialize in treating:  PTSD, Anxiety and Depression, Bipolar Disorder, ADHD, Obesity, Marital Distress, Chronic Tension, Panic Attacks, as well as Phobias          [facility]  [address], [place] [address]  Hours: Monday-Friday, 8 a.m.-5 p.m  Phone: [phone]  [facility]  [address] [place] [address]  Tel. [phone]  Fax [phone]  [facility]  [address] [place] [address]  Tel. [phone]  Fax [phone]  [facility]  [address] [place], [place]  Tel. [phone]  Fax [phone]  [facility]  [address] [place] [address]  Tel. [phone]  Fax [phone]  Walk -In till 4pm all insurances accepted      [facility]  Our skilled providers specialize in treating:  Attention Deficit Hyperactivity Disorder  Attention Deficit Disorder  Obsessive Compulsive Disorder   Autism  Bipolar Disorder  Depression   Anxiety  A variety of other psychological disorders  [place]  Phone: [phone]  [place]  [address], [place], [address]  [phone]  [place]  [address], [place], [address]  [phone]  [place]  [address], [place], [address]  [phone]  [place]  [address] [place] [address]  [phone]  ACCEPTED INSURANCE:  AETNA  BCBS  [facility]  CIGNA  [place]  HUMANA  [place]  [place] MEDICAID  TRICARE MULTIPLAN PHCS UNITED HEALTHCARE UNITED BEHAVIORAL HEALTH [facility]UM HE[facility]CARE [facility] [place]  MD Bubba Moran NP Elisa Himel, NP  The providers of [facility] help patients age 16 and over with the treatment of a variety of mental disorders, including:  Stress  Depression  Anxiety  Schizophrenia  Dementia  Bipolar  Developmental disabilities  Other psychiatric mental health issues  [facility] [place]   [address], [address]   [place] [address]   Hours: Monday-Friday, 8 a.m.-5 p.m.   Phone: [phone]   Fax: [phone]  ACCEPTED INSURANCE  Blue Cross (PPO, OGB-PPO)*  Humana  Medicare  *PPO: Preferred Provider Organization        Medicaid Community Psychiatry Clinics  [facility]: [phone]  [facility]: [phone]  [facility]: [phone]  [facility]: [phone]  [facility]: [phone]      [facility]   [facility]   Addiction   [address] [place] [address]  [phone]    Dear patient,   As a result of recent federal legislation (The Federal Cures Act), you may receive lab or pathology results from your visit in your [facility] account before your physician is able to contact you. Your physician or their representative will relay the results to you with their recommendations at their soonest availability.     If no improvement in symptoms or symptoms worsen, please be advised to call MD, follow-up at clinic and/or go to ER if becomes severe.    Charles Nash M.D.        We Offer TELEHEALTH & Same Day Appointments!   Book your Telehealth appointment with me through my nurse or   Clinic appointments on [employer]!    [address]  [place] [address]    Office: [phone]   FAX: [phone]    Check out my Facebook Page and Follow Me at: [email or web address]    Check out my website at [employer] by clicking on: [email or web address]    To Schedule appointments online, go to [employer]: [email or web address]

## 2025-05-30 NOTE — ASSESSMENT & PLAN NOTE
Start Jardiance.  Stop glimepiride.  Continue metformin at max dosage.  We will plan to monitor hemoglobin A1c at designated intervals 3 to 6 months.  I recommend ongoing Education for diabetic diet and exercise protocol.  We will continue to monitor for side effects.    Please be advised of symptoms to monitor for and to notify me immediately if persistent or worsening.  Follow up with Ophthalmology/Optometry and Podiatry at least annually.  Discussed risk and benefits of Jardiance.  Patient agrees to proceed.

## 2025-06-03 ENCOUNTER — TELEPHONE (OUTPATIENT)
Dept: FAMILY MEDICINE | Facility: CLINIC | Age: 65
End: 2025-06-03
Payer: MEDICARE

## 2025-06-30 ENCOUNTER — PATIENT MESSAGE (OUTPATIENT)
Dept: PODIATRY | Facility: CLINIC | Age: 65
End: 2025-06-30
Payer: MEDICARE

## 2025-06-30 ENCOUNTER — TELEPHONE (OUTPATIENT)
Dept: PODIATRY | Facility: CLINIC | Age: 65
End: 2025-06-30
Payer: MEDICARE

## 2025-07-14 ENCOUNTER — PATIENT OUTREACH (OUTPATIENT)
Dept: ADMINISTRATIVE | Facility: HOSPITAL | Age: 65
End: 2025-07-14
Payer: MEDICARE

## 2025-07-14 NOTE — PROGRESS NOTES
[facility] Report: Called Pt to r/s Podiatry appt, no answer, voicemail full.

## 2025-07-21 ENCOUNTER — PATIENT MESSAGE (OUTPATIENT)
Dept: ADMINISTRATIVE | Facility: HOSPITAL | Age: 65
End: 2025-07-21
Payer: MEDICARE

## 2025-08-19 ENCOUNTER — PATIENT MESSAGE (OUTPATIENT)
Dept: FAMILY MEDICINE | Facility: CLINIC | Age: 65
End: 2025-08-19
Payer: MEDICARE

## 2025-08-19 DIAGNOSIS — M54.16 RADICULOPATHY, LUMBAR REGION: ICD-10-CM

## 2025-08-19 DIAGNOSIS — Z79.899 ENCOUNTER FOR LONG-TERM (CURRENT) USE OF MEDICATIONS: ICD-10-CM

## 2025-08-19 DIAGNOSIS — E11.51 TYPE 2 DIABETES MELLITUS WITH DIABETIC PERIPHERAL ANGIOPATHY WITHOUT GANGRENE, WITHOUT LONG-TERM CURRENT USE OF INSULIN: ICD-10-CM

## 2025-08-19 RX ORDER — BLOOD-GLUCOSE METER
KIT MISCELLANEOUS
Qty: 1 EACH | Refills: 0 | Status: SHIPPED | OUTPATIENT
Start: 2025-08-19 | End: 2025-08-20

## 2025-08-19 RX ORDER — BLOOD-GLUCOSE METER
KIT MISCELLANEOUS
Qty: 90 EACH | Refills: 4 | Status: SHIPPED | OUTPATIENT
Start: 2025-08-19

## 2025-08-19 RX ORDER — ONDANSETRON 4 MG/1
TABLET, ORALLY DISINTEGRATING ORAL
Qty: 30 TABLET | Refills: 0 | Status: SHIPPED | OUTPATIENT
Start: 2025-08-19

## 2025-08-19 RX ORDER — BLOOD-GLUCOSE METER
KIT MISCELLANEOUS
Qty: 1 EACH | Refills: 0 | Status: SHIPPED | OUTPATIENT
Start: 2025-08-19 | End: 2025-08-19

## 2025-08-19 RX ORDER — LANCETS 28 GAUGE
EACH MISCELLANEOUS
Qty: 90 EACH | Refills: 4 | Status: SHIPPED | OUTPATIENT
Start: 2025-08-19

## 2025-08-19 RX ORDER — GABAPENTIN 300 MG/1
300 CAPSULE ORAL 3 TIMES DAILY
Qty: 270 CAPSULE | Refills: 3 | Status: SHIPPED | OUTPATIENT
Start: 2025-08-19

## 2025-08-20 RX ORDER — BLOOD-GLUCOSE METER
KIT MISCELLANEOUS
Qty: 1 EACH | Refills: 0 | Status: SHIPPED | OUTPATIENT
Start: 2025-08-20

## 2025-08-26 ENCOUNTER — PATIENT OUTREACH (OUTPATIENT)
Dept: ADMINISTRATIVE | Facility: HOSPITAL | Age: 65
End: 2025-08-26
Payer: MEDICARE

## 2025-09-02 ENCOUNTER — LAB VISIT (OUTPATIENT)
Dept: LAB | Facility: HOSPITAL | Age: 65
End: 2025-09-02
Attending: FAMILY MEDICINE
Payer: MEDICARE

## 2025-09-02 DIAGNOSIS — Z13.6 ENCOUNTER FOR LIPID SCREENING FOR CARDIOVASCULAR DISEASE: ICD-10-CM

## 2025-09-02 DIAGNOSIS — Z00.01 ENCOUNTER FOR GENERAL ADULT MEDICAL EXAMINATION WITH ABNORMAL FINDINGS: ICD-10-CM

## 2025-09-02 DIAGNOSIS — G89.29 OTHER CHRONIC PAIN: ICD-10-CM

## 2025-09-02 DIAGNOSIS — Z79.899 ENCOUNTER FOR LONG-TERM (CURRENT) USE OF MEDICATIONS: ICD-10-CM

## 2025-09-02 DIAGNOSIS — Z13.220 ENCOUNTER FOR LIPID SCREENING FOR CARDIOVASCULAR DISEASE: ICD-10-CM

## 2025-09-02 LAB
ALBUMIN SERPL BCP-MCNC: 3.9 G/DL (ref 3.5–5.2)
ALP SERPL-CCNC: 47 UNIT/L (ref 40–150)
ALT SERPL W/O P-5'-P-CCNC: 25 UNIT/L (ref 0–55)
ANION GAP (OHS): 9 MMOL/L (ref 8–16)
AST SERPL-CCNC: 26 UNIT/L (ref 0–50)
BILIRUB SERPL-MCNC: 0.8 MG/DL (ref 0.1–1)
BUN SERPL-MCNC: 25 MG/DL (ref 8–23)
CALCIUM SERPL-MCNC: 9.4 MG/DL (ref 8.7–10.5)
CHLORIDE SERPL-SCNC: 101 MMOL/L (ref 95–110)
CHOLEST SERPL-MCNC: 151 MG/DL (ref 120–199)
CHOLEST/HDLC SERPL: 2.9 {RATIO} (ref 2–5)
CO2 SERPL-SCNC: 28 MMOL/L (ref 23–29)
CREAT SERPL-MCNC: 1 MG/DL (ref 0.5–1.4)
EAG (OHS): 197 MG/DL (ref 68–131)
GFR SERPLBLD CREATININE-BSD FMLA CKD-EPI: >60 ML/MIN/1.73/M2
GLUCOSE SERPL-MCNC: 104 MG/DL (ref 70–110)
HBA1C MFR BLD: 8.5 % (ref 4–5.6)
HDLC SERPL-MCNC: 52 MG/DL (ref 40–75)
HDLC SERPL: 34.4 % (ref 20–50)
LDLC SERPL CALC-MCNC: 86.6 MG/DL (ref 63–159)
NONHDLC SERPL-MCNC: 99 MG/DL
POTASSIUM SERPL-SCNC: 3.6 MMOL/L (ref 3.5–5.1)
PROT SERPL-MCNC: 7.2 GM/DL (ref 6–8.4)
SODIUM SERPL-SCNC: 138 MMOL/L (ref 136–145)
TRIGL SERPL-MCNC: 62 MG/DL (ref 30–150)

## 2025-09-02 PROCEDURE — 36415 COLL VENOUS BLD VENIPUNCTURE: CPT | Mod: PO

## 2025-09-02 PROCEDURE — 80171 DRUG SCREEN QUANT GABAPENTIN: CPT

## 2025-09-02 PROCEDURE — 82040 ASSAY OF SERUM ALBUMIN: CPT

## 2025-09-02 PROCEDURE — 83036 HEMOGLOBIN GLYCOSYLATED A1C: CPT

## 2025-09-02 PROCEDURE — 80061 LIPID PANEL: CPT

## 2025-09-06 LAB — GABAPENTIN SERPLBLD-MCNC: 1.5 MCG/ML (ref 2–20)
